# Patient Record
Sex: MALE | Race: WHITE | Employment: OTHER | URBAN - NONMETROPOLITAN AREA
[De-identification: names, ages, dates, MRNs, and addresses within clinical notes are randomized per-mention and may not be internally consistent; named-entity substitution may affect disease eponyms.]

---

## 2017-01-24 ENCOUNTER — HOSPITAL ENCOUNTER (OUTPATIENT)
Dept: VASCULAR LAB | Age: 72
Discharge: HOME | End: 2017-01-24
Payer: COMMERCIAL

## 2017-01-24 DIAGNOSIS — I65.23 BILATERAL CAROTID ARTERY STENOSIS: ICD-10-CM

## 2017-01-24 PROCEDURE — 93880 EXTRACRANIAL BILAT STUDY: CPT

## 2017-01-25 ENCOUNTER — TELEPHONE (OUTPATIENT)
Dept: VASCULAR SURGERY | Age: 72
End: 2017-01-25

## 2017-01-25 DIAGNOSIS — I65.23 BILATERAL CAROTID ARTERY STENOSIS: Primary | ICD-10-CM

## 2018-01-30 ENCOUNTER — OFFICE VISIT (OUTPATIENT)
Dept: VASCULAR SURGERY | Age: 73
End: 2018-01-30
Payer: COMMERCIAL

## 2018-01-30 ENCOUNTER — HOSPITAL ENCOUNTER (OUTPATIENT)
Dept: NON INVASIVE DIAGNOSTICS | Age: 73
Discharge: HOME | End: 2018-01-30
Payer: COMMERCIAL

## 2018-01-30 VITALS
WEIGHT: 200 LBS | SYSTOLIC BLOOD PRESSURE: 148 MMHG | TEMPERATURE: 98.4 F | BODY MASS INDEX: 28.63 KG/M2 | HEIGHT: 70 IN | RESPIRATION RATE: 16 BRPM | DIASTOLIC BLOOD PRESSURE: 74 MMHG | HEART RATE: 73 BPM

## 2018-01-30 DIAGNOSIS — I65.23 BILATERAL CAROTID ARTERY STENOSIS: Primary | ICD-10-CM

## 2018-01-30 DIAGNOSIS — I65.23 BILATERAL CAROTID ARTERY STENOSIS: ICD-10-CM

## 2018-01-30 PROCEDURE — G8598 ASA/ANTIPLAT THER USED: HCPCS | Performed by: NURSE PRACTITIONER

## 2018-01-30 PROCEDURE — 93880 EXTRACRANIAL BILAT STUDY: CPT

## 2018-01-30 PROCEDURE — G8419 CALC BMI OUT NRM PARAM NOF/U: HCPCS | Performed by: NURSE PRACTITIONER

## 2018-01-30 PROCEDURE — G8484 FLU IMMUNIZE NO ADMIN: HCPCS | Performed by: NURSE PRACTITIONER

## 2018-01-30 PROCEDURE — 99212 OFFICE O/P EST SF 10 MIN: CPT | Performed by: NURSE PRACTITIONER

## 2018-01-30 PROCEDURE — G8427 DOCREV CUR MEDS BY ELIG CLIN: HCPCS | Performed by: NURSE PRACTITIONER

## 2018-01-30 PROCEDURE — 4040F PNEUMOC VAC/ADMIN/RCVD: CPT | Performed by: NURSE PRACTITIONER

## 2018-01-30 PROCEDURE — 1123F ACP DISCUSS/DSCN MKR DOCD: CPT | Performed by: NURSE PRACTITIONER

## 2018-01-30 PROCEDURE — 3017F COLORECTAL CA SCREEN DOC REV: CPT | Performed by: NURSE PRACTITIONER

## 2018-01-30 PROCEDURE — 1036F TOBACCO NON-USER: CPT | Performed by: NURSE PRACTITIONER

## 2018-08-02 ENCOUNTER — HOSPITAL ENCOUNTER (OUTPATIENT)
Dept: VASCULAR LAB | Age: 73
Discharge: HOME | End: 2018-08-02
Payer: MEDICARE

## 2018-08-02 ENCOUNTER — OFFICE VISIT (OUTPATIENT)
Dept: VASCULAR SURGERY | Age: 73
End: 2018-08-02
Payer: COMMERCIAL

## 2018-08-02 VITALS — SYSTOLIC BLOOD PRESSURE: 135 MMHG | DIASTOLIC BLOOD PRESSURE: 74 MMHG | HEART RATE: 79 BPM | TEMPERATURE: 98.2 F

## 2018-08-02 DIAGNOSIS — I65.23 BILATERAL CAROTID ARTERY STENOSIS: ICD-10-CM

## 2018-08-02 DIAGNOSIS — I65.23 BILATERAL CAROTID ARTERY STENOSIS: Primary | ICD-10-CM

## 2018-08-02 PROCEDURE — 99213 OFFICE O/P EST LOW 20 MIN: CPT | Performed by: PHYSICIAN ASSISTANT

## 2018-08-02 PROCEDURE — 1101F PT FALLS ASSESS-DOCD LE1/YR: CPT | Performed by: PHYSICIAN ASSISTANT

## 2018-08-02 PROCEDURE — 3017F COLORECTAL CA SCREEN DOC REV: CPT | Performed by: PHYSICIAN ASSISTANT

## 2018-08-02 PROCEDURE — G8598 ASA/ANTIPLAT THER USED: HCPCS | Performed by: PHYSICIAN ASSISTANT

## 2018-08-02 PROCEDURE — G8428 CUR MEDS NOT DOCUMENT: HCPCS | Performed by: PHYSICIAN ASSISTANT

## 2018-08-02 PROCEDURE — 1123F ACP DISCUSS/DSCN MKR DOCD: CPT | Performed by: PHYSICIAN ASSISTANT

## 2018-08-02 PROCEDURE — 4040F PNEUMOC VAC/ADMIN/RCVD: CPT | Performed by: PHYSICIAN ASSISTANT

## 2018-08-02 PROCEDURE — 1036F TOBACCO NON-USER: CPT | Performed by: PHYSICIAN ASSISTANT

## 2018-08-02 PROCEDURE — G8419 CALC BMI OUT NRM PARAM NOF/U: HCPCS | Performed by: PHYSICIAN ASSISTANT

## 2018-08-02 PROCEDURE — 93880 EXTRACRANIAL BILAT STUDY: CPT

## 2018-08-02 NOTE — PROGRESS NOTES
Patient Care Team:  Iban Swain MD as PCP - Agustina Kent MD      Subjective    Mr. Alli Wood presents for follow-up of carotid artery stenosis. He has prior history of carotid occlusive disease for 1 - 5 years. His current treatment includes ASA EC, Plavix and a statin  daily. He denies a history of CVA. He reports no TIA's, episodes of amaurosis fugax and episodes of lateralizing weakness/numbness/tingling. He reports occasional dizziness. Shabbir Bartholomew is a 72 y.o. 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  ezetimibe-simvastatin (VYTORIN) 10-40 MG per tablet Take 1 tablet by mouth nightly.  gabapentin (NEURONTIN) 600 MG tablet Take 600 mg by mouth 4 times daily.  tramadol-acetaminophen (ULTRACET) 37.5-325 MG per tablet Take 1 tablet by mouth every 6 hours as needed.  metformin (GLUCOPHAGE) 1000 MG tablet Take 1,000 mg by mouth 2 times daily (with meals).  esomeprazole (NEXIUM) 40 MG capsule Take 40 mg by mouth every morning (before breakfast).  amitriptyline (ELAVIL) 100 MG tablet Take 100 mg by mouth nightly.  aspirin 81 MG EC tablet Take 81 mg by mouth daily.  clopidogrel (PLAVIX) 75 MG tablet Take 75 mg by mouth daily.         magnesium oxide (MAG-OX) 400 MG tablet Take 400 mg by mouth daily. No current facility-administered medications for this visit. Current Outpatient Prescriptions on File Prior to Visit   Medication Sig Dispense Refill    amLODIPine (NORVASC) 5 MG tablet       diclofenac (VOLTAREN) 50 MG EC tablet   3    indapamide (LOZOL) 1.25 MG tablet       ramipril (ALTACE) 10 MG capsule       simvastatin (ZOCOR) 40 MG tablet       tamsulosin (FLOMAX) 0.4 MG capsule       diclofenac (CATAFLAM) 50 MG tablet Take 50 mg by mouth 3 times daily.  ezetimibe-simvastatin (VYTORIN) 10-40 MG per tablet Take 1 tablet by mouth nightly.  gabapentin (NEURONTIN) 600 MG tablet Take 600 mg by mouth 4 times daily.  tramadol-acetaminophen (ULTRACET) 37.5-325 MG per tablet Take 1 tablet by mouth every 6 hours as needed.  metformin (GLUCOPHAGE) 1000 MG tablet Take 1,000 mg by mouth 2 times daily (with meals).  esomeprazole (NEXIUM) 40 MG capsule Take 40 mg by mouth every morning (before breakfast).  amitriptyline (ELAVIL) 100 MG tablet Take 100 mg by mouth nightly.  aspirin 81 MG EC tablet Take 81 mg by mouth daily.  clopidogrel (PLAVIX) 75 MG tablet Take 75 mg by mouth daily.  magnesium oxide (MAG-OX) 400 MG tablet Take 400 mg by mouth daily. No current facility-administered medications on file prior to visit.       Allergies: Quinine derivatives and Sulfur  Past Medical History:   Diagnosis Date    Carotid artery stenosis 2012    Chronic back pain     Diabetes mellitus ([facility] 2013    Diabetic vasculopathy ([facility] 2013    GERD (gastroesophageal reflux disease)     Hyperlipidemia     Hypertension      Past Surgical History:   Procedure Laterality Date    COLONOSCOPY      EYE SURGERY      both eyes    HEMORRHOID SURGERY       Family History   Problem Relation Age of Onset    High Blood Pressure Mother     Heart Attack Mother          of MI     Social History Substance Use Topics    Smoking status: Former Smoker     Years: 20.00     Types: Cigarettes     Quit date: 12/13/1983    Smokeless tobacco: Never Used    Alcohol use No       Review of Systems    Constitutional  no significant activity change, appetite change, or unexpected weight change. No fever or chills. No diaphoresis or significant fatigue. HENT  no significant rhinorrhea or epistaxis. No tinnitus or significant hearing loss. Eyes  no sudden vision change or amaurosis. Respiratory  no significant shortness of breath, wheezing, or stridor. No apnea, cough, or chest tightness associated with shortness of breath. Cardiovascular  no chest pain, syncope, or significant dizziness. No palpitations or significant leg swelling. No claudication. Gastrointestinal  no abdominal swelling or pain. No blood in stool. No severe constipation, diarrhea, nausea, or vomiting. Genitourinary  No difficulty urinating, dysuria, frequency, or urgency. No flank pain or hematuria. Musculoskeletal  no back pain, gait disturbance, or myalgia. Skin  no color change, rash, pallor, or new wound. Neurologic  no dizziness, facial asymmetry, or light headedness. No seizures. No speech difficulty or lateralizing weakness. Hematologic  no easy bruising or excessive bleeding. Psychiatric  no severe anxiety or nervousness. No confusion. All other review of systems are negative. Physical Exam    Vitals:    08/02/18 1341 08/02/18 1343   BP: 126/71 135/74   Site: Right Arm Left Arm   Pulse: 83 79   Temp: 98.2 °F (36.8 °C)        Constitutional  well developed, well nourished. No diaphoresis or acute distress. HENT  head normocephalic. Right external ear canal appears normal.  Left external ear canal appears normal.  Septum appears midline. Eyes  conjunctiva normal.  EOMS normal.  No exudate. No icterus. Neck- ROM appears normal, no tracheal deviation. Cardiovascular  Regular rate and rhythm.

## 2019-10-07 ENCOUNTER — HOSPITAL ENCOUNTER (OUTPATIENT)
Dept: VASCULAR LAB | Age: 74
Discharge: HOME | End: 2019-10-07
Payer: COMMERCIAL

## 2019-10-07 PROCEDURE — 93880 EXTRACRANIAL BILAT STUDY: CPT

## 2019-10-18 ENCOUNTER — TELEPHONE (OUTPATIENT)
Dept: VASCULAR SURGERY | Age: 74
End: 2019-10-18

## 2019-10-18 DIAGNOSIS — I65.23 BILATERAL CAROTID ARTERY STENOSIS: Primary | ICD-10-CM

## 2020-04-16 ENCOUNTER — VIRTUAL VISIT (OUTPATIENT)
Dept: VASCULAR SURGERY | Age: 75
End: 2020-04-16
Payer: COMMERCIAL

## 2020-04-16 PROCEDURE — G2012 BRIEF CHECK IN BY MD/QHP: HCPCS | Performed by: PHYSICIAN ASSISTANT

## 2020-05-27 ENCOUNTER — TRANSCRIBE ORDERS (OUTPATIENT)
Dept: ADMINISTRATIVE | Facility: HOSPITAL | Age: 75
End: 2020-05-27

## 2020-05-27 DIAGNOSIS — Z01.818 PREOP TESTING: Primary | ICD-10-CM

## 2020-05-29 ENCOUNTER — HOSPITAL ENCOUNTER (OUTPATIENT)
Dept: VASCULAR LAB | Age: 75
Discharge: HOME | End: 2020-05-29
Payer: COMMERCIAL

## 2020-05-29 PROCEDURE — 93880 EXTRACRANIAL BILAT STUDY: CPT

## 2020-06-02 ENCOUNTER — LAB (OUTPATIENT)
Dept: LAB | Facility: HOSPITAL | Age: 75
End: 2020-06-02

## 2020-06-02 PROCEDURE — U0003 INFECTIOUS AGENT DETECTION BY NUCLEIC ACID (DNA OR RNA); SEVERE ACUTE RESPIRATORY SYNDROME CORONAVIRUS 2 (SARS-COV-2) (CORONAVIRUS DISEASE [COVID-19]), AMPLIFIED PROBE TECHNIQUE, MAKING USE OF HIGH THROUGHPUT TECHNOLOGIES AS DESCRIBED BY CMS-2020-01-R: HCPCS | Performed by: PAIN MEDICINE

## 2020-06-03 LAB
COVID LABCORP PRIORITY: NORMAL
SARS-COV-2 RNA RESP QL NAA+PROBE: NOT DETECTED

## 2020-06-04 ENCOUNTER — VIRTUAL VISIT (OUTPATIENT)
Dept: VASCULAR SURGERY | Age: 75
End: 2020-06-04

## 2020-09-15 ENCOUNTER — TRANSCRIBE ORDERS (OUTPATIENT)
Dept: ADMINISTRATIVE | Facility: HOSPITAL | Age: 75
End: 2020-09-15

## 2020-09-15 DIAGNOSIS — Z01.818 PREOP TESTING: Primary | ICD-10-CM

## 2020-09-17 ENCOUNTER — LAB (OUTPATIENT)
Dept: LAB | Facility: HOSPITAL | Age: 75
End: 2020-09-17

## 2020-09-17 PROCEDURE — U0003 INFECTIOUS AGENT DETECTION BY NUCLEIC ACID (DNA OR RNA); SEVERE ACUTE RESPIRATORY SYNDROME CORONAVIRUS 2 (SARS-COV-2) (CORONAVIRUS DISEASE [COVID-19]), AMPLIFIED PROBE TECHNIQUE, MAKING USE OF HIGH THROUGHPUT TECHNOLOGIES AS DESCRIBED BY CMS-2020-01-R: HCPCS | Performed by: PAIN MEDICINE

## 2020-09-17 PROCEDURE — C9803 HOPD COVID-19 SPEC COLLECT: HCPCS | Performed by: PAIN MEDICINE

## 2020-09-18 LAB
COVID LABCORP PRIORITY: NORMAL
SARS-COV-2 RNA RESP QL NAA+PROBE: NOT DETECTED

## 2020-12-07 ENCOUNTER — HOSPITAL ENCOUNTER (OUTPATIENT)
Dept: VASCULAR LAB | Age: 75
Discharge: HOME | End: 2020-12-07
Payer: MEDICARE

## 2020-12-07 PROCEDURE — 93880 EXTRACRANIAL BILAT STUDY: CPT

## 2020-12-08 ENCOUNTER — VIRTUAL VISIT (OUTPATIENT)
Dept: VASCULAR SURGERY | Age: 75
End: 2020-12-08
Payer: MEDICARE

## 2020-12-08 PROCEDURE — 99441 PR PHYS/QHP TELEPHONE EVALUATION 5-10 MIN: CPT | Performed by: PHYSICIAN ASSISTANT

## 2020-12-08 NOTE — PROGRESS NOTES
Karolina Crook is a 70 y.o. male evaluated via telephone on 12/8/2020. Consent:  He and/or health care decision maker is aware that that he may receive a bill for this telephone service, depending on his insurance coverage, and has provided verbal consent to proceed: Yes      Documentation:  I communicated with the patient and/or health care decision maker about due to the covid-19 Pandemic, we are trying to limit exposures to our patients whom have elective follow ups. We are calling each individual patient to make sure they are doing okay. Karolina Crook is a 70 y.o. male with the following history as recorded in [facility]:  Patient Active Problem List    Diagnosis Date Noted    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  ezetimibe-simvastatin (VYTORIN) 10-40 MG per tablet Take 1 tablet by mouth nightly.  gabapentin (NEURONTIN) 600 MG tablet Take 600 mg by mouth 4 times daily.  tramadol-acetaminophen (ULTRACET) 37.5-325 MG per tablet Take 1 tablet by mouth every 6 hours as needed.  metformin (GLUCOPHAGE) 1000 MG tablet Take 1,000 mg by mouth 2 times daily (with meals).  esomeprazole (NEXIUM) 40 MG capsule Take 40 mg by mouth every morning (before breakfast).  amitriptyline (ELAVIL) 100 MG tablet Take 100 mg by mouth nightly.  aspirin 81 MG EC tablet Take 81 mg by mouth daily.         clopidogrel (PLAVIX) 75 MG tablet Take 75 mg by mouth daily.  magnesium oxide (MAG-OX) 400 MG tablet Take 400 mg by mouth daily. No current facility-administered medications for this visit. Allergies: Quinine derivatives and Sulfur  Past Medical History:   Diagnosis Date    Carotid artery stenosis 2012    Chronic back pain     Diabetes mellitus ([facility] 2013    Diabetic vasculopathy ([facility] 2013    GERD (gastroesophageal reflux disease)     Hyperlipidemia     Hypertension      Past Surgical History:   Procedure Laterality Date    COLONOSCOPY      EYE SURGERY      both eyes    HEMORRHOID SURGERY       Family History   Problem Relation Age of Onset    High Blood Pressure Mother     Heart Attack Mother          of MI     Social History     Tobacco Use    Smoking status: Former Smoker     Years: 20.00     Types: Cigarettes     Last attempt to quit: 1983     Years since quittin.0    Smokeless tobacco: Never Used   Substance Use Topics    Alcohol use: No       Details of this discussion including any medical advice provided: PMH, medications and allergies reviewed. Mr. Sebastian Mckoy is a 71 yo male who has a history of Carotid Artery Stenosis. He reports that his BP when he goes to the Doctor is always good but he does not check it at home. He reports that his last A1c was down from previous but he cannot recall what it was. He does not smoke. he denies any new onset of partial or complete loss of vision affecting only one eye, speech difficulty or lateralizing weakness, numbness/tingling.        CDU -    and arterial hypertension.          Impression          There is heterogeneous plaque visualized in the bilateral internal carotid     artery(ies).     There is less than 50% stenosis in the right internal carotid artery.     There is 50-69% stenosis of the left internal carotid artery.     There is normal antegrade flow in the bilateral vertebral artery(ies).          Signature

## 2020-12-18 ENCOUNTER — TRANSCRIBE ORDERS (OUTPATIENT)
Dept: ADMINISTRATIVE | Facility: HOSPITAL | Age: 75
End: 2020-12-18

## 2020-12-18 DIAGNOSIS — Z01.818 PREOP TESTING: Primary | ICD-10-CM

## 2020-12-24 ENCOUNTER — LAB (OUTPATIENT)
Dept: LAB | Facility: HOSPITAL | Age: 75
End: 2020-12-24

## 2020-12-24 DIAGNOSIS — Z01.818 PRE-OP EVALUATION: Primary | ICD-10-CM

## 2020-12-24 LAB — SARS-COV-2 ORF1AB RESP QL NAA+PROBE: NOT DETECTED

## 2020-12-24 PROCEDURE — C9803 HOPD COVID-19 SPEC COLLECT: HCPCS

## 2020-12-24 PROCEDURE — U0004 COV-19 TEST NON-CDC HGH THRU: HCPCS | Performed by: PAIN MEDICINE

## 2021-01-07 ENCOUNTER — HOSPITAL ENCOUNTER (INPATIENT)
Facility: HOSPITAL | Age: 76
LOS: 15 days | Discharge: HOME | End: 2021-01-23
Attending: EMERGENCY MEDICINE | Admitting: THORACIC SURGERY (CARDIOTHORACIC VASCULAR SURGERY)

## 2021-01-07 DIAGNOSIS — I21.4 NSTEMI (NON-ST ELEVATED MYOCARDIAL INFARCTION) (HCC): Primary | ICD-10-CM

## 2021-01-07 DIAGNOSIS — Z74.09 IMPAIRED MOBILITY: ICD-10-CM

## 2021-01-07 DIAGNOSIS — I25.10 3-VESSEL CORONARY ARTERY DISEASE: ICD-10-CM

## 2021-01-07 DIAGNOSIS — I50.9 ACUTE CONGESTIVE HEART FAILURE, UNSPECIFIED HEART FAILURE TYPE (HCC): ICD-10-CM

## 2021-01-07 PROCEDURE — 99285 EMERGENCY DEPT VISIT HI MDM: CPT

## 2021-01-08 ENCOUNTER — APPOINTMENT (OUTPATIENT)
Dept: CARDIOLOGY | Facility: HOSPITAL | Age: 76
End: 2021-01-08

## 2021-01-08 ENCOUNTER — APPOINTMENT (OUTPATIENT)
Dept: GENERAL RADIOLOGY | Facility: HOSPITAL | Age: 76
End: 2021-01-08

## 2021-01-08 ENCOUNTER — APPOINTMENT (OUTPATIENT)
Dept: CT IMAGING | Facility: HOSPITAL | Age: 76
End: 2021-01-08

## 2021-01-08 PROBLEM — I50.21 ACUTE SYSTOLIC CONGESTIVE HEART FAILURE: Status: ACTIVE | Noted: 2021-01-08

## 2021-01-08 PROBLEM — I21.4 NSTEMI (NON-ST ELEVATED MYOCARDIAL INFARCTION) (HCC): Status: ACTIVE | Noted: 2021-01-08

## 2021-01-08 PROBLEM — J81.0 ACUTE PULMONARY EDEMA: Status: ACTIVE | Noted: 2021-01-08

## 2021-01-08 LAB
ALBUMIN SERPL-MCNC: 4.6 G/DL (ref 3.5–5.2)
ALBUMIN/GLOB SERPL: 1.5 G/DL
ALP SERPL-CCNC: 68 U/L (ref 39–117)
ALT SERPL W P-5'-P-CCNC: 17 U/L (ref 1–41)
ANION GAP SERPL CALCULATED.3IONS-SCNC: 13 MMOL/L (ref 5–15)
AST SERPL-CCNC: 29 U/L (ref 1–40)
BASOPHILS # BLD AUTO: 0.07 10*3/MM3 (ref 0–0.2)
BASOPHILS NFR BLD AUTO: 0.4 % (ref 0–1.5)
BH CV ECHO MEAS - AO MAX PG (FULL): 2.6 MMHG
BH CV ECHO MEAS - AO MAX PG: 6.1 MMHG
BH CV ECHO MEAS - AO MEAN PG (FULL): 1 MMHG
BH CV ECHO MEAS - AO MEAN PG: 3 MMHG
BH CV ECHO MEAS - AO ROOT AREA (BSA CORRECTED): 1.5
BH CV ECHO MEAS - AO ROOT AREA: 7.5 CM^2
BH CV ECHO MEAS - AO ROOT DIAM: 3.1 CM
BH CV ECHO MEAS - AO V2 MAX: 123 CM/SEC
BH CV ECHO MEAS - AO V2 MEAN: 82.6 CM/SEC
BH CV ECHO MEAS - AO V2 VTI: 16.8 CM
BH CV ECHO MEAS - AVA(I,A): 2.4 CM^2
BH CV ECHO MEAS - AVA(I,D): 2.4 CM^2
BH CV ECHO MEAS - AVA(V,A): 2.1 CM^2
BH CV ECHO MEAS - AVA(V,D): 2.1 CM^2
BH CV ECHO MEAS - BSA(HAYCOCK): 2.1 M^2
BH CV ECHO MEAS - BSA: 2.1 M^2
BH CV ECHO MEAS - BZI_BMI: 29.2 KILOGRAMS/M^2
BH CV ECHO MEAS - BZI_METRIC_HEIGHT: 175.3 CM
BH CV ECHO MEAS - BZI_METRIC_WEIGHT: 89.8 KG
BH CV ECHO MEAS - EDV(CUBED): 144.7 ML
BH CV ECHO MEAS - EDV(MOD-SP4): 105 ML
BH CV ECHO MEAS - EDV(TEICH): 132.4 ML
BH CV ECHO MEAS - EF(CUBED): 74.9 %
BH CV ECHO MEAS - EF(MOD-SP4): 41.8 %
BH CV ECHO MEAS - EF(TEICH): 66.4 %
BH CV ECHO MEAS - ESV(CUBED): 36.3 ML
BH CV ECHO MEAS - ESV(MOD-SP4): 61.1 ML
BH CV ECHO MEAS - ESV(TEICH): 44.5 ML
BH CV ECHO MEAS - FS: 37 %
BH CV ECHO MEAS - IVS/LVPW: 1.2
BH CV ECHO MEAS - IVSD: 1.1 CM
BH CV ECHO MEAS - LA DIMENSION: 4 CM
BH CV ECHO MEAS - LA/AO: 1.3
BH CV ECHO MEAS - LAT PEAK E' VEL: 4.5 CM/SEC
BH CV ECHO MEAS - LV DIASTOLIC VOL/BSA (35-75): 51 ML/M^2
BH CV ECHO MEAS - LV MASS(C)D: 185.9 GRAMS
BH CV ECHO MEAS - LV MASS(C)DI: 90.4 GRAMS/M^2
BH CV ECHO MEAS - LV MAX PG: 3.5 MMHG
BH CV ECHO MEAS - LV MEAN PG: 2 MMHG
BH CV ECHO MEAS - LV SYSTOLIC VOL/BSA (12-30): 29.7 ML/M^2
BH CV ECHO MEAS - LV V1 MAX: 93 CM/SEC
BH CV ECHO MEAS - LV V1 MEAN: 63.1 CM/SEC
BH CV ECHO MEAS - LV V1 VTI: 14.2 CM
BH CV ECHO MEAS - LVIDD: 5.3 CM
BH CV ECHO MEAS - LVIDS: 3.3 CM
BH CV ECHO MEAS - LVLD AP4: 7.7 CM
BH CV ECHO MEAS - LVLS AP4: 7.5 CM
BH CV ECHO MEAS - LVOT AREA (M): 2.8 CM^2
BH CV ECHO MEAS - LVOT AREA: 2.8 CM^2
BH CV ECHO MEAS - LVOT DIAM: 1.9 CM
BH CV ECHO MEAS - LVPWD: 0.86 CM
BH CV ECHO MEAS - MED PEAK E' VEL: 4.13 CM/SEC
BH CV ECHO MEAS - MV A MAX VEL: 96.2 CM/SEC
BH CV ECHO MEAS - MV DEC TIME: 0.16 SEC
BH CV ECHO MEAS - MV E MAX VEL: 47.5 CM/SEC
BH CV ECHO MEAS - MV E/A: 0.49
BH CV ECHO MEAS - PA MAX PG: 7.2 MMHG
BH CV ECHO MEAS - PA V2 MAX: 134 CM/SEC
BH CV ECHO MEAS - RAP SYSTOLE: 5 MMHG
BH CV ECHO MEAS - RVSP: 23.3 MMHG
BH CV ECHO MEAS - SI(AO): 61.6 ML/M^2
BH CV ECHO MEAS - SI(CUBED): 52.7 ML/M^2
BH CV ECHO MEAS - SI(LVOT): 19.6 ML/M^2
BH CV ECHO MEAS - SI(MOD-SP4): 21.3 ML/M^2
BH CV ECHO MEAS - SI(TEICH): 42.8 ML/M^2
BH CV ECHO MEAS - SV(AO): 126.8 ML
BH CV ECHO MEAS - SV(CUBED): 108.4 ML
BH CV ECHO MEAS - SV(LVOT): 40.3 ML
BH CV ECHO MEAS - SV(MOD-SP4): 43.9 ML
BH CV ECHO MEAS - SV(TEICH): 88 ML
BH CV ECHO MEAS - TR MAX VEL: 214 CM/SEC
BH CV ECHO MEASUREMENTS AVERAGE E/E' RATIO: 11.01
BILIRUB SERPL-MCNC: 0.5 MG/DL (ref 0–1.2)
BUN SERPL-MCNC: 20 MG/DL (ref 8–23)
BUN/CREAT SERPL: 26.3 (ref 7–25)
CALCIUM SPEC-SCNC: 10.1 MG/DL (ref 8.6–10.5)
CHLORIDE SERPL-SCNC: 98 MMOL/L (ref 98–107)
CO2 SERPL-SCNC: 23 MMOL/L (ref 22–29)
CREAT SERPL-MCNC: 0.76 MG/DL (ref 0.76–1.27)
D DIMER PPP FEU-MCNC: 1.14 MG/L (FEU) (ref 0–0.5)
D-LACTATE SERPL-SCNC: 1.7 MMOL/L (ref 0.5–2)
D-LACTATE SERPL-SCNC: 2.1 MMOL/L (ref 0.5–2)
DEPRECATED RDW RBC AUTO: 38.8 FL (ref 37–54)
EOSINOPHIL # BLD AUTO: 0.01 10*3/MM3 (ref 0–0.4)
EOSINOPHIL NFR BLD AUTO: 0.1 % (ref 0.3–6.2)
ERYTHROCYTE [DISTWIDTH] IN BLOOD BY AUTOMATED COUNT: 12.7 % (ref 12.3–15.4)
GFR SERPL CREATININE-BSD FRML MDRD: 101 ML/MIN/1.73
GLOBULIN UR ELPH-MCNC: 3 GM/DL
GLUCOSE BLDC GLUCOMTR-MCNC: 144 MG/DL (ref 70–130)
GLUCOSE BLDC GLUCOMTR-MCNC: 180 MG/DL (ref 70–130)
GLUCOSE BLDC GLUCOMTR-MCNC: 191 MG/DL (ref 70–130)
GLUCOSE BLDC GLUCOMTR-MCNC: 227 MG/DL (ref 70–130)
GLUCOSE SERPL-MCNC: 230 MG/DL (ref 65–99)
HCT VFR BLD AUTO: 45.3 % (ref 37.5–51)
HGB BLD-MCNC: 15.5 G/DL (ref 13–17.7)
IMM GRANULOCYTES # BLD AUTO: 0.11 10*3/MM3 (ref 0–0.05)
IMM GRANULOCYTES NFR BLD AUTO: 0.6 % (ref 0–0.5)
INR PPP: 1.03 (ref 0.91–1.09)
LACTATE HOLD SPECIMEN: NORMAL
LEFT ATRIUM VOLUME INDEX: 28.9 ML/M2
LEFT ATRIUM VOLUME: 59.6 CM3
LYMPHOCYTES # BLD AUTO: 1.84 10*3/MM3 (ref 0.7–3.1)
LYMPHOCYTES NFR BLD AUTO: 10 % (ref 19.6–45.3)
MAXIMAL PREDICTED HEART RATE: 147 BPM
MCH RBC QN AUTO: 28.9 PG (ref 26.6–33)
MCHC RBC AUTO-ENTMCNC: 34.2 G/DL (ref 31.5–35.7)
MCV RBC AUTO: 84.5 FL (ref 79–97)
MONOCYTES # BLD AUTO: 2.18 10*3/MM3 (ref 0.1–0.9)
MONOCYTES NFR BLD AUTO: 11.8 % (ref 5–12)
NEUTROPHILS NFR BLD AUTO: 14.22 10*3/MM3 (ref 1.7–7)
NEUTROPHILS NFR BLD AUTO: 77.1 % (ref 42.7–76)
NRBC BLD AUTO-RTO: 0 /100 WBC (ref 0–0.2)
NT-PROBNP SERPL-MCNC: 1608 PG/ML (ref 0–900)
PLATELET # BLD AUTO: 307 10*3/MM3 (ref 140–450)
PMV BLD AUTO: 10.9 FL (ref 6–12)
POTASSIUM SERPL-SCNC: 5 MMOL/L (ref 3.5–5.2)
PROCALCITONIN SERPL-MCNC: 0.15 NG/ML (ref 0–0.25)
PROT SERPL-MCNC: 7.6 G/DL (ref 6–8.5)
PROTHROMBIN TIME: 13.1 SECONDS (ref 11.9–14.6)
QT INTERVAL: 344 MS
QTC INTERVAL: 459 MS
RBC # BLD AUTO: 5.36 10*6/MM3 (ref 4.14–5.8)
SARS-COV-2 RNA PNL SPEC NAA+PROBE: NOT DETECTED
SODIUM SERPL-SCNC: 134 MMOL/L (ref 136–145)
STRESS TARGET HR: 125 BPM
TROPONIN T SERPL-MCNC: 0.16 NG/ML (ref 0–0.03)
TROPONIN T SERPL-MCNC: 0.26 NG/ML (ref 0–0.03)
TROPONIN T SERPL-MCNC: 0.29 NG/ML (ref 0–0.03)
TROPONIN T SERPL-MCNC: 0.3 NG/ML (ref 0–0.03)
TROPONIN T SERPL-MCNC: 0.45 NG/ML (ref 0–0.03)
TROPONIN T SERPL-MCNC: 0.45 NG/ML (ref 0–0.03)
TROPONIN T SERPL-MCNC: 0.54 NG/ML (ref 0–0.03)
TROPONIN T SERPL-MCNC: 0.62 NG/ML (ref 0–0.03)
WBC # BLD AUTO: 18.43 10*3/MM3 (ref 3.4–10.8)

## 2021-01-08 PROCEDURE — 85025 COMPLETE CBC W/AUTO DIFF WBC: CPT | Performed by: EMERGENCY MEDICINE

## 2021-01-08 PROCEDURE — 87635 SARS-COV-2 COVID-19 AMP PRB: CPT | Performed by: EMERGENCY MEDICINE

## 2021-01-08 PROCEDURE — 94799 UNLISTED PULMONARY SVC/PX: CPT

## 2021-01-08 PROCEDURE — 25010000002 ENOXAPARIN PER 10 MG: Performed by: INTERNAL MEDICINE

## 2021-01-08 PROCEDURE — 0 IOPAMIDOL PER 1 ML: Performed by: EMERGENCY MEDICINE

## 2021-01-08 PROCEDURE — 83880 ASSAY OF NATRIURETIC PEPTIDE: CPT | Performed by: EMERGENCY MEDICINE

## 2021-01-08 PROCEDURE — 71275 CT ANGIOGRAPHY CHEST: CPT

## 2021-01-08 PROCEDURE — 87205 SMEAR GRAM STAIN: CPT | Performed by: FAMILY MEDICINE

## 2021-01-08 PROCEDURE — 87070 CULTURE OTHR SPECIMN AEROBIC: CPT | Performed by: FAMILY MEDICINE

## 2021-01-08 PROCEDURE — 25010000002 FUROSEMIDE PER 20 MG: Performed by: INTERNAL MEDICINE

## 2021-01-08 PROCEDURE — 85610 PROTHROMBIN TIME: CPT | Performed by: EMERGENCY MEDICINE

## 2021-01-08 PROCEDURE — 84484 ASSAY OF TROPONIN QUANT: CPT | Performed by: NURSE PRACTITIONER

## 2021-01-08 PROCEDURE — 71045 X-RAY EXAM CHEST 1 VIEW: CPT

## 2021-01-08 PROCEDURE — 80053 COMPREHEN METABOLIC PANEL: CPT | Performed by: EMERGENCY MEDICINE

## 2021-01-08 PROCEDURE — 99223 1ST HOSP IP/OBS HIGH 75: CPT | Performed by: INTERNAL MEDICINE

## 2021-01-08 PROCEDURE — 63710000001 INSULIN LISPRO (HUMAN) PER 5 UNITS: Performed by: INTERNAL MEDICINE

## 2021-01-08 PROCEDURE — 93306 TTE W/DOPPLER COMPLETE: CPT

## 2021-01-08 PROCEDURE — 85379 FIBRIN DEGRADATION QUANT: CPT | Performed by: EMERGENCY MEDICINE

## 2021-01-08 PROCEDURE — 25010000002 ENOXAPARIN PER 10 MG: Performed by: EMERGENCY MEDICINE

## 2021-01-08 PROCEDURE — 84484 ASSAY OF TROPONIN QUANT: CPT | Performed by: EMERGENCY MEDICINE

## 2021-01-08 PROCEDURE — 84484 ASSAY OF TROPONIN QUANT: CPT | Performed by: INTERNAL MEDICINE

## 2021-01-08 PROCEDURE — 25010000002 FUROSEMIDE PER 20 MG: Performed by: FAMILY MEDICINE

## 2021-01-08 PROCEDURE — 84145 PROCALCITONIN (PCT): CPT | Performed by: EMERGENCY MEDICINE

## 2021-01-08 PROCEDURE — 82962 GLUCOSE BLOOD TEST: CPT

## 2021-01-08 PROCEDURE — 83605 ASSAY OF LACTIC ACID: CPT | Performed by: EMERGENCY MEDICINE

## 2021-01-08 PROCEDURE — 93306 TTE W/DOPPLER COMPLETE: CPT | Performed by: INTERNAL MEDICINE

## 2021-01-08 PROCEDURE — 87040 BLOOD CULTURE FOR BACTERIA: CPT | Performed by: EMERGENCY MEDICINE

## 2021-01-08 PROCEDURE — 93010 ELECTROCARDIOGRAM REPORT: CPT | Performed by: INTERNAL MEDICINE

## 2021-01-08 PROCEDURE — 25010000002 PERFLUTREN 6.52 MG/ML SUSPENSION: Performed by: FAMILY MEDICINE

## 2021-01-08 PROCEDURE — 93005 ELECTROCARDIOGRAM TRACING: CPT | Performed by: EMERGENCY MEDICINE

## 2021-01-08 RX ORDER — CLOPIDOGREL BISULFATE 75 MG/1
75 TABLET ORAL DAILY
Status: DISCONTINUED | OUTPATIENT
Start: 2021-01-08 | End: 2021-01-11

## 2021-01-08 RX ORDER — FAMOTIDINE 20 MG/1
20 TABLET, FILM COATED ORAL DAILY
Status: DISCONTINUED | OUTPATIENT
Start: 2021-01-08 | End: 2021-01-18 | Stop reason: HOSPADM

## 2021-01-08 RX ORDER — DEXTROSE MONOHYDRATE 25 G/50ML
25 INJECTION, SOLUTION INTRAVENOUS
Status: DISCONTINUED | OUTPATIENT
Start: 2021-01-08 | End: 2021-01-18 | Stop reason: HOSPADM

## 2021-01-08 RX ORDER — GABAPENTIN 300 MG/1
600 CAPSULE ORAL EVERY 8 HOURS SCHEDULED
Status: DISCONTINUED | OUTPATIENT
Start: 2021-01-08 | End: 2021-01-18 | Stop reason: HOSPADM

## 2021-01-08 RX ORDER — TAMSULOSIN HYDROCHLORIDE 0.4 MG/1
0.4 CAPSULE ORAL DAILY
Status: DISCONTINUED | OUTPATIENT
Start: 2021-01-08 | End: 2021-01-17

## 2021-01-08 RX ORDER — CARVEDILOL 3.12 MG/1
3.12 TABLET ORAL 2 TIMES DAILY WITH MEALS
Status: DISCONTINUED | OUTPATIENT
Start: 2021-01-08 | End: 2021-01-10

## 2021-01-08 RX ORDER — NICOTINE POLACRILEX 4 MG
15 LOZENGE BUCCAL
Status: DISCONTINUED | OUTPATIENT
Start: 2021-01-08 | End: 2021-01-18 | Stop reason: HOSPADM

## 2021-01-08 RX ORDER — FUROSEMIDE 10 MG/ML
20 INJECTION INTRAMUSCULAR; INTRAVENOUS EVERY 12 HOURS
Status: DISCONTINUED | OUTPATIENT
Start: 2021-01-08 | End: 2021-01-08

## 2021-01-08 RX ORDER — ATORVASTATIN CALCIUM 40 MG/1
40 TABLET, FILM COATED ORAL DAILY
Status: DISCONTINUED | OUTPATIENT
Start: 2021-01-09 | End: 2021-01-12

## 2021-01-08 RX ORDER — INDAPAMIDE 1.25 MG/1
1.25 TABLET, FILM COATED ORAL EVERY MORNING
COMMUNITY
End: 2021-06-11

## 2021-01-08 RX ORDER — ASPIRIN 81 MG/1
81 TABLET ORAL DAILY
COMMUNITY

## 2021-01-08 RX ORDER — ASPIRIN 81 MG/1
81 TABLET, CHEWABLE ORAL DAILY
Status: DISCONTINUED | OUTPATIENT
Start: 2021-01-08 | End: 2021-01-18 | Stop reason: HOSPADM

## 2021-01-08 RX ORDER — SODIUM CHLORIDE 0.9 % (FLUSH) 0.9 %
10 SYRINGE (ML) INJECTION EVERY 12 HOURS SCHEDULED
Status: DISCONTINUED | OUTPATIENT
Start: 2021-01-08 | End: 2021-01-18 | Stop reason: HOSPADM

## 2021-01-08 RX ORDER — RANITIDINE 300 MG/1
300 TABLET ORAL 2 TIMES DAILY
COMMUNITY
End: 2021-06-11

## 2021-01-08 RX ORDER — AMLODIPINE BESYLATE 5 MG/1
5 TABLET ORAL DAILY
COMMUNITY

## 2021-01-08 RX ORDER — TAMSULOSIN HYDROCHLORIDE 0.4 MG/1
1 CAPSULE ORAL DAILY
COMMUNITY

## 2021-01-08 RX ORDER — SIMVASTATIN 40 MG
40 TABLET ORAL NIGHTLY
COMMUNITY
End: 2022-12-09

## 2021-01-08 RX ORDER — SODIUM CHLORIDE 0.9 % (FLUSH) 0.9 %
10 SYRINGE (ML) INJECTION AS NEEDED
Status: DISCONTINUED | OUTPATIENT
Start: 2021-01-08 | End: 2021-01-08 | Stop reason: SDUPTHER

## 2021-01-08 RX ORDER — CLOPIDOGREL BISULFATE 75 MG/1
75 TABLET ORAL DAILY
COMMUNITY

## 2021-01-08 RX ORDER — RAMIPRIL 10 MG/1
10 CAPSULE ORAL DAILY
COMMUNITY
End: 2021-01-23 | Stop reason: HOSPADM

## 2021-01-08 RX ORDER — FUROSEMIDE 10 MG/ML
40 INJECTION INTRAMUSCULAR; INTRAVENOUS
Status: DISCONTINUED | OUTPATIENT
Start: 2021-01-08 | End: 2021-01-09

## 2021-01-08 RX ORDER — MULTIPLE VITAMINS W/ MINERALS TAB 9MG-400MCG
1 TAB ORAL DAILY
COMMUNITY

## 2021-01-08 RX ORDER — ONDANSETRON 2 MG/ML
4 INJECTION INTRAMUSCULAR; INTRAVENOUS EVERY 6 HOURS PRN
Status: DISCONTINUED | OUTPATIENT
Start: 2021-01-08 | End: 2021-01-18 | Stop reason: HOSPADM

## 2021-01-08 RX ORDER — GABAPENTIN 600 MG/1
600 TABLET ORAL 4 TIMES DAILY
COMMUNITY

## 2021-01-08 RX ORDER — LISINOPRIL 5 MG/1
5 TABLET ORAL
Status: DISCONTINUED | OUTPATIENT
Start: 2021-01-08 | End: 2021-01-17

## 2021-01-08 RX ORDER — AMITRIPTYLINE HYDROCHLORIDE 100 MG/1
100 TABLET, FILM COATED ORAL NIGHTLY
COMMUNITY

## 2021-01-08 RX ORDER — ACETAMINOPHEN 325 MG/1
650 TABLET ORAL EVERY 4 HOURS PRN
Status: DISCONTINUED | OUTPATIENT
Start: 2021-01-08 | End: 2021-01-18 | Stop reason: HOSPADM

## 2021-01-08 RX ORDER — ATORVASTATIN CALCIUM 10 MG/1
20 TABLET, FILM COATED ORAL DAILY
Status: DISCONTINUED | OUTPATIENT
Start: 2021-01-08 | End: 2021-01-08

## 2021-01-08 RX ORDER — INDAPAMIDE 1.25 MG/1
1.25 TABLET, FILM COATED ORAL
Status: DISCONTINUED | OUTPATIENT
Start: 2021-01-08 | End: 2021-01-14

## 2021-01-08 RX ORDER — SODIUM CHLORIDE 0.9 % (FLUSH) 0.9 %
10 SYRINGE (ML) INJECTION AS NEEDED
Status: DISCONTINUED | OUTPATIENT
Start: 2021-01-08 | End: 2021-01-18 | Stop reason: HOSPADM

## 2021-01-08 RX ORDER — AMLODIPINE BESYLATE 5 MG/1
5 TABLET ORAL DAILY
Status: DISCONTINUED | OUTPATIENT
Start: 2021-01-08 | End: 2021-01-08

## 2021-01-08 RX ADMIN — GABAPENTIN 600 MG: 300 CAPSULE ORAL at 13:32

## 2021-01-08 RX ADMIN — ENOXAPARIN SODIUM 90 MG: 100 INJECTION SUBCUTANEOUS at 17:44

## 2021-01-08 RX ADMIN — ENOXAPARIN SODIUM 90 MG: 100 INJECTION SUBCUTANEOUS at 05:50

## 2021-01-08 RX ADMIN — TRAMADOL HYDROCHLORIDE AND ACETAMINOPHEN 1 TABLET: 37.5; 325 TABLET ORAL at 15:04

## 2021-01-08 RX ADMIN — FAMOTIDINE 20 MG: 20 TABLET, FILM COATED ORAL at 08:38

## 2021-01-08 RX ADMIN — CARVEDILOL 3.12 MG: 3.12 TABLET, FILM COATED ORAL at 17:47

## 2021-01-08 RX ADMIN — AMLODIPINE BESYLATE 5 MG: 5 TABLET ORAL at 08:38

## 2021-01-08 RX ADMIN — CLOPIDOGREL 75 MG: 75 TABLET, FILM COATED ORAL at 08:38

## 2021-01-08 RX ADMIN — FUROSEMIDE 20 MG: 10 INJECTION, SOLUTION INTRAVENOUS at 08:38

## 2021-01-08 RX ADMIN — INDAPAMIDE 1.25 MG: 1.25 TABLET, FILM COATED ORAL at 11:09

## 2021-01-08 RX ADMIN — FUROSEMIDE 40 MG: 10 INJECTION, SOLUTION INTRAMUSCULAR; INTRAVENOUS at 17:47

## 2021-01-08 RX ADMIN — GABAPENTIN 600 MG: 300 CAPSULE ORAL at 08:39

## 2021-01-08 RX ADMIN — LISINOPRIL 5 MG: 5 TABLET ORAL at 08:38

## 2021-01-08 RX ADMIN — TAMSULOSIN HYDROCHLORIDE 0.4 MG: 0.4 CAPSULE ORAL at 08:38

## 2021-01-08 RX ADMIN — ASPIRIN 81 MG: 81 TABLET, CHEWABLE ORAL at 17:47

## 2021-01-08 RX ADMIN — PERFLUTREN 9.78 MG: 6.52 INJECTION, SUSPENSION INTRAVENOUS at 13:18

## 2021-01-08 RX ADMIN — AMITRIPTYLINE HYDROCHLORIDE 100 MG: 25 TABLET, FILM COATED ORAL at 21:09

## 2021-01-08 RX ADMIN — SODIUM CHLORIDE, PRESERVATIVE FREE 10 ML: 5 INJECTION INTRAVENOUS at 21:09

## 2021-01-08 RX ADMIN — GABAPENTIN 600 MG: 300 CAPSULE ORAL at 22:50

## 2021-01-08 RX ADMIN — ATORVASTATIN CALCIUM 20 MG: 10 TABLET, FILM COATED ORAL at 08:38

## 2021-01-08 RX ADMIN — INSULIN LISPRO 3 UNITS: 100 INJECTION, SOLUTION INTRAVENOUS; SUBCUTANEOUS at 13:30

## 2021-01-08 RX ADMIN — INSULIN LISPRO 2 UNITS: 100 INJECTION, SOLUTION INTRAVENOUS; SUBCUTANEOUS at 08:50

## 2021-01-08 RX ADMIN — INSULIN LISPRO 2 UNITS: 100 INJECTION, SOLUTION INTRAVENOUS; SUBCUTANEOUS at 17:45

## 2021-01-08 RX ADMIN — SODIUM CHLORIDE, PRESERVATIVE FREE 10 ML: 5 INJECTION INTRAVENOUS at 08:39

## 2021-01-08 RX ADMIN — IOPAMIDOL 100 ML: 755 INJECTION, SOLUTION INTRAVENOUS at 06:11

## 2021-01-08 NOTE — PROGRESS NOTES
Discharge Planning Assessment  [facility]     Patient Name: Wesley Eduardo  MRN: [id number]  Today's Date: 1/8/2021    Admit Date: 1/7/2021    Discharge Needs Assessment     Row Name 01/08/21 1715       Living Environment    Lives With  spouse    Name(s) of Who Lives With Patient  NATHAN    Current Living Arrangements  home/apartment/condo    Primary Care Provided by  self    Provides Primary Care For  no one    Family Caregiver if Needed  spouse    Quality of Family Relationships  helpful;involved;supportive    Able to Return to Prior Arrangements  yes       Resource/Environmental Concerns    Resource/Environmental Concerns  none    Transportation Concerns  car, none       Transition Planning    Patient/Family Anticipates Transition to  home    Transportation Anticipated  family or friend will provide       Discharge Needs Assessment    Readmission Within the Last 30 Days  no previous admission in last 30 days    Equipment Currently Used at Home  none    Concerns to be Addressed  denies needs/concerns at this time    Equipment Needed After Discharge  none        Discharge Plan     Row Name 01/08/21 1716       Plan    Plan  HOME    Patient/Family in Agreement with Plan  yes    Plan Comments  PT. REPORTS HE LIVES INDEPENDENTLY AT HOME WITH HIS WIFE.  PT. GETS RX FROM THE [facility]; WILL FAX D/C SUMMARY TO CLINIC WHEN REC'D.  PT. DENIES ANY IDENTIFIED D/C PLANNING NEEDS AT PRESENT, HOWEVER NOTED PT. WILL HAVE CABG AND WILL CONT. TO FOLLOW FOR ANY POST OP D/C NEEDS THAT MAY ARISE.        Continued Care and Services - Admitted Since 1/7/2021    Coordination has not been started for this encounter.         Demographic Summary    No documentation.       Functional Status    No documentation.       Psychosocial    No documentation.       Abuse/Neglect    No documentation.       Legal    No documentation.       Substance Abuse    No documentation.       Patient Forms    No documentation.           Kisha Norwood,  LSW

## 2021-01-08 NOTE — H&P
"    [facility] Medicine Services  HISTORY AND PHYSICAL    Date of Admission: 1/7/2021  Primary Care Physician: Alfredito Land MD    Subjective     Chief Complaint: NASH    History of Present Illness  75-year-old man who presents to [facility] with shortness of breath.  Worse with exertion.  He was transferred to our ER facility.  He was found to have elevated troponin.  The patient tells me that his shortness of breath has been going on since last fall.  Today he was putting out a with his tractor for his horses, and developed shortness of breath to the point that he states \"I could not go.\"He denies any lower extremity edema.  He denies any acute bowel or kidney dysfunction.  He denies any diaphoresis.  He states that he has not had any cardiac complications in the past, nothing like this is happened to him before.        Review of Systems   NASH    Otherwise complete ROS reviewed and negative except as mentioned in the HPI.    Past Medical History:   Past Medical History:   Diagnosis Date   • Diabetes mellitus (CMS/HCC)    • Hyperlipidemia    • Hypertension      Past Surgical History:  Past Surgical History:   Procedure Laterality Date   • HEMORRHOIDECTOMY       Social History:  reports that he has never smoked. He has never used smokeless tobacco. He reports that he does not drink alcohol or use drugs.    Family History: HTN    Allergies:  Allergies   Allergen Reactions   • Sulfa Antibiotics Angioedema   • Quine [Quinine] Unknown - Low Severity     Medications:  Prior to Admission medications    Medication Sig Start Date End Date Taking? Authorizing Provider   amitriptyline (ELAVIL) 100 MG tablet Take 100 mg by mouth Every Night.   Yes ProviderNiyah MD   amLODIPine (NORVASC) 5 MG tablet Take 5 mg by mouth Daily.   Yes ProviderNiyah MD   clopidogrel (PLAVIX) 75 MG tablet Take 75 mg by mouth Daily.   Yes ProviderNiyah MD   gabapentin " "(NEURONTIN) 600 MG tablet Take 600 mg by mouth 3 (Three) Times a Day.   Yes Niyah Angel MD   indapamide (LOZOL) 1.25 MG tablet Take 1.25 mg by mouth Every Morning.   Yes Niyah Angel MD   metFORMIN (GLUCOPHAGE) 1000 MG tablet Take 1,000 mg by mouth 2 (Two) Times a Day With Meals.   Yes Niyah Angel MD   raNITIdine (ZANTAC) 75 MG tablet Take 300 mg by mouth 2 (Two) Times a Day.   Yes Niyah Angel MD   tamsulosin (FLOMAX) 0.4 MG capsule 24 hr capsule Take 1 capsule by mouth Daily.   Yes Niyah Angel MD     Objective     Vital Signs: /72 (BP Location: Left arm, Patient Position: Lying)   Pulse 113   Temp 98.1 °F (36.7 °C) (Oral)   Resp 20   Ht 175.3 cm (69\")   Wt 90.7 kg (200 lb)   SpO2 90%   BMI 29.53 kg/m²   Physical Exam  Vitals signs reviewed.   Constitutional:       Appearance: He is ill-appearing.   HENT:      Head: Normocephalic and atraumatic.      Nose: Nose normal.   Eyes:      Conjunctiva/sclera: Conjunctivae normal.   Neck:      Musculoskeletal: Normal range of motion and neck supple.   Cardiovascular:      Rate and Rhythm: Normal rate and regular rhythm.      Heart sounds: Normal heart sounds.   Pulmonary:      Breath sounds: Rhonchi present.      Comments: Tachypnea  Abdominal:      General: Bowel sounds are normal.      Palpations: Abdomen is soft.   Musculoskeletal:         General: No swelling or tenderness.   Skin:     General: Skin is warm and dry.   Neurological:      General: No focal deficit present.      Mental Status: He is alert.      Cranial Nerves: No cranial nerve deficit.   Psychiatric:         Mood and Affect: Mood normal.         Behavior: Behavior normal.        Results Reviewed:  Lab Results (last 24 hours)     Procedure Component Value Units Date/Time    Lactic Acid, Reflex [[id number]]  (Normal) Collected: 01/08/21 0519    Specimen: Blood Updated: 01/08/21 0542     Lactate 1.7 mmol/L     Lactic Acid, Reflex Timer (This will " reflex a repeat order 3-3:15 hours after ordered.) [[id number]] Collected: 01/08/21 0046    Specimen: Blood Updated: 01/08/21 0430     Hold Tube Hold for add-ons.     Comment: Auto resulted.       Troponin [[id number]]  (Abnormal) Collected: 01/08/21 0333    Specimen: Blood Updated: 01/08/21 0401     Troponin T 0.257 ng/mL     Narrative:      Troponin T Reference Range:  <= 0.03 ng/mL-   Negative for AMI  >0.03 ng/mL-     Abnormal for myocardial necrosis.  Clinicians would have to utilize clinical acumen, EKG, Troponin and serial changes to determine if it is an Acute Myocardial Infarction or myocardial injury due to an underlying chronic condition.       Results may be falsely decreased if patient taking Biotin.      COVID PRE-OP / PRE-PROCEDURE SCREENING ORDER (NO ISOLATION) - Swab, Nasal Cavity [[id number]]  (Normal) Collected: 01/08/21 0052    Specimen: Swab from Nasal Cavity Updated: 01/08/21 0148    Narrative:      The following orders were created for panel order COVID PRE-OP / PRE-PROCEDURE SCREENING ORDER (NO ISOLATION) - Swab, Nasal Cavity.  Procedure                               Abnormality         Status                     ---------                               -----------         ------                     COVID-19,[facility] [employer] [facility]...[[id number]]  Normal              Final result                 Please view results for these tests on the individual orders.    COVID-19,[facility] [employer] IN-HOUSE,Nasal Swab No Transport Media 3-4 HR TAT - Swab, Nasal Cavity [[id number]]  (Normal) Collected: 01/08/21 0052    Specimen: Swab from Nasal Cavity Updated: 01/08/21 0148     COVID19 Not Detected    Narrative:      Fact sheet for providers: https://www.fda.gov/media/525495/download     Fact sheet for patients: https://www.fda.gov/media/511524/download    Test performed by PCR.    Lactic Acid, Plasma [[id number]]  (Abnormal) Collected: 01/08/21 0046    Specimen: Blood Updated: 01/08/21 0124     Lactate 2.1 mmol/L     Troponin  "[[id number]]  (Abnormal) Collected: 01/08/21 0046    Specimen: Blood Updated: 01/08/21 0123     Troponin T 0.159 ng/mL     Narrative:      Troponin T Reference Range:  <= 0.03 ng/mL-   Negative for AMI  >0.03 ng/mL-     Abnormal for myocardial necrosis.  Clinicians would have to utilize clinical acumen, EKG, Troponin and serial changes to determine if it is an Acute Myocardial Infarction or myocardial injury due to an underlying chronic condition.       Results may be falsely decreased if patient taking Biotin.      Procalcitonin [[id number]]  (Normal) Collected: 01/08/21 0046    Specimen: Blood Updated: 01/08/21 0123     Procalcitonin 0.15 ng/mL     Narrative:      As a Marker for Sepsis (Non-Neonates):   1. <0.5 ng/mL represents a low risk of severe sepsis and/or septic shock.  1. >2 ng/mL represents a high risk of severe sepsis and/or septic shock.    As a Marker for Lower Respiratory Tract Infections that require antibiotic therapy:  PCT on Admission     Antibiotic Therapy             6-12 Hrs later  > 0.5                Strongly Recommended            >0.25 - <0.5         Recommended  0.1 - 0.25           Discouraged                   Remeasure/reassess PCT  <0.1                 Strongly Discouraged          Remeasure/reassess PCT      As 28 day mortality risk marker: \"Change in Procalcitonin Result\" (> 80 % or <=80 %) if Day 0 (or Day 1) and Day 4 values are available. Refer to http://www.[facility]-pct-calculator.com/   Change in PCT <=80 %   A decrease of PCT levels below or equal to 80 % defines a positive change in PCT test result representing a higher risk for 28-day all-cause mortality of patients diagnosed with severe sepsis or septic shock.  Change in PCT > 80 %   A decrease of PCT levels of more than 80 % defines a negative change in PCT result representing a lower risk for 28-day all-cause mortality of patients diagnosed with severe sepsis or septic shock.                Results may be falsely decreased if " patient taking Biotin.     Comprehensive Metabolic Panel [[id number]]  (Abnormal) Collected: 01/08/21 0046    Specimen: Blood Updated: 01/08/21 0118     Glucose 230 mg/dL      BUN 20 mg/dL      Creatinine 0.76 mg/dL      Sodium 134 mmol/L      Potassium 5.0 mmol/L      Chloride 98 mmol/L      CO2 23.0 mmol/L      Calcium 10.1 mg/dL      Total Protein 7.6 g/dL      Albumin 4.60 g/dL      ALT (SGPT) 17 U/L      AST (SGOT) 29 U/L      Alkaline Phosphatase 68 U/L      Total Bilirubin 0.5 mg/dL      eGFR Non African Amer 101 mL/min/1.73      Globulin 3.0 gm/dL      A/G Ratio 1.5 g/dL      BUN/Creatinine Ratio 26.3     Anion Gap 13.0 mmol/L     Narrative:      GFR Normal >60  Chronic Kidney Disease <60  Kidney Failure <15      Protime-INR [[id number]]  (Normal) Collected: 01/08/21 0046    Specimen: Blood Updated: 01/08/21 0115     Protime 13.1 Seconds      INR 1.03    BNP [[id number]]  (Abnormal) Collected: 01/08/21 0046    Specimen: Blood Updated: 01/08/21 0115     proBNP 1,608.0 pg/mL     Narrative:      Among patients with dyspnea, NT-proBNP is highly sensitive for the detection of acute congestive heart failure. In addition NT-proBNP of <300 pg/ml effectively rules out acute congestive heart failure with 99% negative predictive value.    Results may be falsely decreased if patient taking Biotin.      D-dimer, Quantitative [[id number]]  (Abnormal) Collected: 01/08/21 0046    Specimen: Blood Updated: 01/08/21 0115     D-Dimer, Quantitative 1.14 mg/L (FEU)     Narrative:      Reference Range is 0-0.50 mg/L FEU. However, results <0.50 mg/L FEU tends to rule out DVT or PE. Results >0.50 mg/L FEU are not useful in predicting absence or presence of DVT or PE.      Blood Culture - Blood, Arm, Left [[id number]] Collected: 01/08/21 0046    Specimen: Blood from Arm, Left Updated: 01/08/21 0057    Blood Culture - Blood, Arm, Right [[id number]] Collected: 01/08/21 0046    Specimen: Blood from Arm, Right Updated: 01/08/21 0057    CBC &  Differential [[id number]]  (Abnormal) Collected: 01/08/21 0046    Specimen: Blood Updated: 01/08/21 0057    Narrative:      The following orders were created for panel order CBC & Differential.  Procedure                               Abnormality         Status                     ---------                               -----------         ------                     CBC Auto Differential[[id number]]        Abnormal            Final result                 Please view results for these tests on the individual orders.    CBC Auto Differential [[id number]]  (Abnormal) Collected: 01/08/21 0046    Specimen: Blood Updated: 01/08/21 0057     WBC 18.43 10*3/mm3      RBC 5.36 10*6/mm3      Hemoglobin 15.5 g/dL      Hematocrit 45.3 %      MCV 84.5 fL      MCH 28.9 pg      MCHC 34.2 g/dL      RDW 12.7 %      RDW-SD 38.8 fl      MPV 10.9 fL      Platelets 307 10*3/mm3      Neutrophil % 77.1 %      Lymphocyte % 10.0 %      Monocyte % 11.8 %      Eosinophil % 0.1 %      Basophil % 0.4 %      Immature Grans % 0.6 %      Neutrophils, Absolute 14.22 10*3/mm3      Lymphocytes, Absolute 1.84 10*3/mm3      Monocytes, Absolute 2.18 10*3/mm3      Eosinophils, Absolute 0.01 10*3/mm3      Basophils, Absolute 0.07 10*3/mm3      Immature Grans, Absolute 0.11 10*3/mm3      nRBC 0.0 /100 WBC         Imaging Results (Last 24 Hours)     Procedure Component Value Units Date/Time    CT Angiogram Chest [[id number]] Resulted: 01/08/21 0618     Updated: 01/08/21 0626    XR Chest 1 View [[id number]] Resulted: 01/08/21 0053     Updated: 01/08/21 0103        I have personally reviewed and interpreted the radiology studies and ECG obtained at time of admission.     Assessment / Plan     Assessment:   Active Hospital Problems    Diagnosis   • NSTEMI (non-ST elevated myocardial infarction) (CMS/[facility], located within [facility])     Impression:  1. NSTEMI  2. Volume overload  3. DM 2 with hyperglycemia  4. Dyspnea on exertion  5. Mild hyponatremia  6. Hypertension    Plan:   1. Telemetry  2.  IV lasix  3. IO daily weight  4. Trend CM  5. Cardiac echo  6. Consult cardiology    7. Hold glucophage and start SSI  8. Labs in the am     Code Status: Full     I discussed the patient's findings and my recommendations with the patient    Estimated length of stay 1-2 days    Patient seen and examined by me on 1/8/20.    Electronically signed by Manuel Jones DO, 01/08/21, 06:46 CST.

## 2021-01-08 NOTE — PLAN OF CARE
Goal Outcome Evaluation:     Progress: improving  Outcome Summary: VSS, now off vapotherm on on 6 liters/hiflo, urinating well, continues to get IV lasix, states breathing as improved, did cough up slight red tinge sputum, states chest heaviness feels better, troponins trending upward, cardiology in to see pt, possbile future heart cath per pt statement, c/o of chronic back pain prn tramadol given, safety maintained

## 2021-01-08 NOTE — CONSULTS
Referring Provider: No ref. provider found    Reason for Consultation: Elevated Troponin, NASH    Chief complaint:   Chief Complaint   Patient presents with   • Shortness of Breath       Subjective     History of present illness:  Wesley Eduardo is a 75 y.o. male with history of hypertension, hyperlipidemia, Type II diabetes mellitus, bilateral carotid disease (followed by vascular at [facility]), coronary artery disease (s/p [facility] in [place] in 1977; that wife reports was treated medically and received no intervention). Patient has been dealing with exertional dyspnea for 8 months or greater. He reports that he had dyspnea on exertion this summer when he was riding mowing his yard. He reports that he would stop and rest for 10-15 minutes and it would resolve. Patient reports that he started wearing a mask when mowing and noted improvement in the dyspnea.    Yesterday he was feeding his horses and experienced a sudden onset of worsening symptoms to the point where he had to stop and go inside. He rested for a while with no improvement. He went to a town meeting and when he returned home he was worse. Around 8 pm his wife took him to [facility] ER for evaluation. Patient reports that he had some chest pain when his shortness of breath was severe; rating his pain a 10/10 and describing it as a pressure. patient denies any radiation of pain. He denies any nausea or diaphoresis. On arrival to the ER the patient was found to be hypoxic and had increased work of breathing. Patient reports that he had improvement in dyspnea with oxygen and resolution of chest pain when breathing was improved. CXR reported to show pneumonia and WBC was 24,000. Troponin was elevated at 0.028. He was given Rocephin at [facility] and transferred here for pneumonia, sepsis and NSTEMI.   Once here patient was on 3L and O2 was noted to be 89% so he was moved to high flow. Workup here revealed EKG showing sinus Tachycardia. Ddimer of 1.14. BNP  1,608. Troponin of 0.159, 0.257, 0.292. Cr of 0.76, Na 134 and K 5.0  CXR showed bilateral airspace disease most concerning for pneumonia. CTA chest showed no evidence of pulmonary embolus. Pleural effusions noted.   Today patient lying in bed with wife at bedside. Patient reports that he is feeling better. He is currently on high flow oxygen via NC and O2 sats are 94-95%. Patient denies any further episodes of chest pain since admission and transfer to our facility. Patient denies any leg swelling, or PND. Patient denies noting any orthopnea prior to yesterday. Patient denies any heart racing or palpations. Patient denies any other episodes of chest pain. Patient has received Lasix with good urine response.     History  Past Medical History:   Diagnosis Date   • Diabetes mellitus (CMS/HCC)    • Hyperlipidemia    • Hypertension    ,   Past Surgical History:   Procedure Laterality Date   • HEMORRHOIDECTOMY     ,   History reviewed. No pertinent family history.,   Social History     Tobacco Use   • Smoking status: Never Smoker   • Smokeless tobacco: Never Used   Substance Use Topics   • Alcohol use: Never     Frequency: Never   • Drug use: Never   ,     Medications    Prior to Admission medications    Medication Sig Start Date End Date Taking? Authorizing Provider   amitriptyline (ELAVIL) 100 MG tablet Take 100 mg by mouth Every Night.   Yes Niyah Angel MD   amLODIPine (NORVASC) 5 MG tablet Take 5 mg by mouth Daily.   Yes Niyah Angel MD   clopidogrel (PLAVIX) 75 MG tablet Take 75 mg by mouth Daily.   Yes Niyah Angel MD   gabapentin (NEURONTIN) 600 MG tablet Take 600 mg by mouth 3 (Three) Times a Day.   Yes Niyah Angel MD   indapamide (LOZOL) 1.25 MG tablet Take 1.25 mg by mouth Every Morning.   Yes Niyah Angel MD   metFORMIN (GLUCOPHAGE) 1000 MG tablet Take 1,000 mg by mouth 2 (Two) Times a Day With Meals.   Yes Niyah Angel MD   raNITIdine (ZANTAC) 75 MG  tablet Take 300 mg by mouth 2 (Two) Times a Day.   Yes ProviderNiyah MD   tamsulosin (FLOMAX) 0.4 MG capsule 24 hr capsule Take 1 capsule by mouth Daily.   Yes Provider, MD Niyah       Current Facility-Administered Medications   Medication Dose Route Frequency Provider Last Rate Last Admin   • acetaminophen (TYLENOL) tablet 650 mg  650 mg Oral Q4H PRN Manuel Jones DO       • amitriptyline (ELAVIL) tablet 100 mg  100 mg Oral Nightly Manuel Jones DO       • amLODIPine (NORVASC) tablet 5 mg  5 mg Oral Daily Manuel Jones DO   5 mg at 01/08/21 0838   • atorvastatin (LIPITOR) tablet 20 mg  20 mg Oral Daily Manuel Jones DO   20 mg at 01/08/21 0838   • clopidogrel (PLAVIX) tablet 75 mg  75 mg Oral Daily Manuel Jones DO   75 mg at 01/08/21 0838   • dextrose (D50W) 25 g/ 50mL Intravenous Solution 25 g  25 g Intravenous Q15 Min PRN Manuel Jones DO       • dextrose (GLUTOSE) oral gel 15 g  15 g Oral Q15 Min PRN Manuel Jones DO       • famotidine (PEPCID) tablet 20 mg  20 mg Oral Daily Manuel Jones DO   20 mg at 01/08/21 0838   • furosemide (LASIX) injection 40 mg  40 mg Intravenous BID Magdiel Schultz DO       • gabapentin (NEURONTIN) capsule 600 mg  600 mg Oral Q8H Manuel Jones DO   600 mg at 01/08/21 0839   • glucagon (human recombinant) (GLUCAGEN DIAGNOSTIC) injection 1 mg  1 mg Subcutaneous Q15 Min PRN Manuel Jones DO       • indapamide (LOZOL) tablet 1.25 mg  1.25 mg Oral Q AM Manuel Jones DO       • insulin lispro (humaLOG, ADMELOG) injection 2-7 Units  2-7 Units Subcutaneous TID AC Manuel Jones DO   2 Units at 01/08/21 0850   • lisinopril (PRINIVIL,ZESTRIL) tablet 5 mg  5 mg Oral Q24H Manuel Jones DO   5 mg at 01/08/21 0838   • ondansetron (ZOFRAN) injection 4 mg  4 mg Intravenous Q6H PRN Manuel Jones DO       • sodium chloride 0.9 % flush 10 mL  10 mL Intravenous PRN Andreas Vizcarra MD       •  "sodium chloride 0.9 % flush 10 mL  10 mL Intravenous Q12H Manuel Jones DO   10 mL at 01/08/21 0839   • sodium chloride 0.9 % flush 10 mL  10 mL Intravenous PRN Manuel Jones DO       • tamsulosin (FLOMAX) 24 hr capsule 0.4 mg  0.4 mg Oral Daily Manuel Jones DO   0.4 mg at 01/08/21 0838       Allergies:  Sulfa antibiotics and Quine [quinine]    Review of Systems  Review of Systems   Cardiovascular: Positive for chest pain (no further chest pain since admission) and dyspnea on exertion. Negative for leg swelling, palpitations and paroxysmal nocturnal dyspnea.   Respiratory: Positive for shortness of breath.    All other systems reviewed and are negative.      Objective     Physical Exam:  Patient Vitals for the past 24 hrs:   BP Temp Temp src Pulse Resp SpO2 Height Weight   01/08/21 0800 -- -- -- -- -- 95 % -- --   01/08/21 0756 117/70 98.2 °F (36.8 °C) Oral 105 20 95 % -- --   01/08/21 0633 149/72 98.1 °F (36.7 °C) Oral 113 20 90 % 175.3 cm (69\") 90.2 kg (198 lb 12.8 oz)   01/08/21 0615 151/87 -- -- 108 -- 94 % -- --   01/08/21 0515 160/89 -- -- 106 -- 96 % -- --   01/08/21 0415 145/87 -- -- 106 -- 94 % -- --   01/08/21 0413 -- -- -- 107 -- 96 % -- --   01/08/21 0314 -- -- -- 102 -- 98 % -- --   01/08/21 0138 -- -- -- 108 -- 95 % -- --   01/07/21 2353 140/87 97.9 °F (36.6 °C) Oral 108 22 99 % 175.3 cm (69\") 90.7 kg (200 lb)       Intake/Output Summary (Last 24 hours) at 1/8/2021 1116  Last data filed at 1/8/2021 0928  Gross per 24 hour   Intake --   Output 1050 ml   Net -1050 ml       Vitals signs reviewed.   Constitutional:       General: Not in acute distress.     Appearance: Normal appearance. Well-developed.      Interventions: Nasal cannula in place.      Comments: High flow oxygen   Eyes:      Pupils: Pupils are equal, round, and reactive to light.   HENT:      Head: Normocephalic and atraumatic.      Nose: Nose normal.   Neck:      Musculoskeletal: Normal range of motion and neck supple.    "   Vascular: No carotid bruit.   Pulmonary:      Effort: Pulmonary effort is normal. No respiratory distress.      Breath sounds: No wheezing. Rhonchi present. Rales present.   Cardiovascular:      Tachycardia present. Regular rhythm.      Murmurs: There is no murmur.   Edema:     Peripheral edema absent.   Abdominal:      General: There is no distension.      Palpations: Abdomen is soft.   Musculoskeletal: Normal range of motion.   Skin:     General: Skin is warm.      Findings: No erythema or rash.   Neurological:      Mental Status: Alert and oriented to person, place, and time.   Psychiatric:         Attention and Perception: Attention normal.         Mood and Affect: Mood normal.         Speech: Speech normal.         Behavior: Behavior normal.         Thought Content: Thought content normal.         Judgment: Judgment normal.         Telemetry: sinus tachycardia rate of 103 currently; rates 101-110 today.     Results Review:   I reviewed the patient's new clinical results.    Lab Results (last 72 hours)     Procedure Component Value Units Date/Time    Troponin [[id number]]  (Abnormal) Collected: 01/08/21 0800    Specimen: Blood Updated: 01/08/21 0856     Troponin T 0.292 ng/mL     Narrative:      Troponin T Reference Range:  <= 0.03 ng/mL-   Negative for AMI  >0.03 ng/mL-     Abnormal for myocardial necrosis.  Clinicians would have to utilize clinical acumen, EKG, Troponin and serial changes to determine if it is an Acute Myocardial Infarction or myocardial injury due to an underlying chronic condition.       Results may be falsely decreased if patient taking Biotin.      POC Glucose Once [[id number]]  (Abnormal) Collected: 01/08/21 0759    Specimen: Blood Updated: 01/08/21 0834     Glucose 191 mg/dL      Comment: : [id number] [place] LisaMeter ID: [id number]       Lactic Acid, Reflex [[id number]]  (Normal) Collected: 01/08/21 0519    Specimen: Blood Updated: 01/08/21 0542     Lactate 1.7 mmol/L     Lactic Acid,  Reflex Timer (This will reflex a repeat order 3-3:15 hours after ordered.) [[id number]] Collected: 01/08/21 0046    Specimen: Blood Updated: 01/08/21 0430     Hold Tube Hold for add-ons.     Comment: Auto resulted.       Troponin [[id number]]  (Abnormal) Collected: 01/08/21 0333    Specimen: Blood Updated: 01/08/21 0401     Troponin T 0.257 ng/mL     Narrative:      Troponin T Reference Range:  <= 0.03 ng/mL-   Negative for AMI  >0.03 ng/mL-     Abnormal for myocardial necrosis.  Clinicians would have to utilize clinical acumen, EKG, Troponin and serial changes to determine if it is an Acute Myocardial Infarction or myocardial injury due to an underlying chronic condition.       Results may be falsely decreased if patient taking Biotin.      COVID PRE-OP / PRE-PROCEDURE SCREENING ORDER (NO ISOLATION) - Swab, Nasal Cavity [[id number]]  (Normal) Collected: 01/08/21 0052    Specimen: Swab from Nasal Cavity Updated: 01/08/21 0148    Narrative:      The following orders were created for panel order COVID PRE-OP / PRE-PROCEDURE SCREENING ORDER (NO ISOLATION) - Swab, Nasal Cavity.  Procedure                               Abnormality         Status                     ---------                               -----------         ------                     COVID-19,[facility]...[[id number]]  Normal              Final result                 Please view results for these tests on the individual orders.    COVID-19,[facility] IN-HOUSE,Nasal Swab No Transport Media 3-4 HR TAT - Swab, Nasal Cavity [[id number]]  (Normal) Collected: 01/08/21 0052    Specimen: Swab from Nasal Cavity Updated: 01/08/21 0148     COVID19 Not Detected    Narrative:      Fact sheet for providers: https://www.fda.gov/media/662623/download     Fact sheet for patients: https://www.fda.gov/media/966549/download    Test performed by PCR.    Lactic Acid, Plasma [[id number]]  (Abnormal) Collected: 01/08/21 0046    Specimen: Blood Updated: 01/08/21 0124     Lactate  "2.1 mmol/L     Troponin [[id number]]  (Abnormal) Collected: 01/08/21 0046    Specimen: Blood Updated: 01/08/21 0123     Troponin T 0.159 ng/mL     Narrative:      Troponin T Reference Range:  <= 0.03 ng/mL-   Negative for AMI  >0.03 ng/mL-     Abnormal for myocardial necrosis.  Clinicians would have to utilize clinical acumen, EKG, Troponin and serial changes to determine if it is an Acute Myocardial Infarction or myocardial injury due to an underlying chronic condition.       Results may be falsely decreased if patient taking Biotin.      Procalcitonin [[id number]]  (Normal) Collected: 01/08/21 0046    Specimen: Blood Updated: 01/08/21 0123     Procalcitonin 0.15 ng/mL     Narrative:      As a Marker for Sepsis (Non-Neonates):   1. <0.5 ng/mL represents a low risk of severe sepsis and/or septic shock.  1. >2 ng/mL represents a high risk of severe sepsis and/or septic shock.    As a Marker for Lower Respiratory Tract Infections that require antibiotic therapy:  PCT on Admission     Antibiotic Therapy             6-12 Hrs later  > 0.5                Strongly Recommended            >0.25 - <0.5         Recommended  0.1 - 0.25           Discouraged                   Remeasure/reassess PCT  <0.1                 Strongly Discouraged          Remeasure/reassess PCT      As 28 day mortality risk marker: \"Change in Procalcitonin Result\" (> 80 % or <=80 %) if Day 0 (or Day 1) and Day 4 values are available. Refer to http://www.[employer]-pct-calculator.com/   Change in PCT <=80 %   A decrease of PCT levels below or equal to 80 % defines a positive change in PCT test result representing a higher risk for 28-day all-cause mortality of patients diagnosed with severe sepsis or septic shock.  Change in PCT > 80 %   A decrease of PCT levels of more than 80 % defines a negative change in PCT result representing a lower risk for 28-day all-cause mortality of patients diagnosed with severe sepsis or septic shock.                Results may " be falsely decreased if patient taking Biotin.     Comprehensive Metabolic Panel [[id number]]  (Abnormal) Collected: 01/08/21 0046    Specimen: Blood Updated: 01/08/21 0118     Glucose 230 mg/dL      BUN 20 mg/dL      Creatinine 0.76 mg/dL      Sodium 134 mmol/L      Potassium 5.0 mmol/L      Chloride 98 mmol/L      CO2 23.0 mmol/L      Calcium 10.1 mg/dL      Total Protein 7.6 g/dL      Albumin 4.60 g/dL      ALT (SGPT) 17 U/L      AST (SGOT) 29 U/L      Alkaline Phosphatase 68 U/L      Total Bilirubin 0.5 mg/dL      eGFR Non African Amer 101 mL/min/1.73      Globulin 3.0 gm/dL      A/G Ratio 1.5 g/dL      BUN/Creatinine Ratio 26.3     Anion Gap 13.0 mmol/L     Narrative:      GFR Normal >60  Chronic Kidney Disease <60  Kidney Failure <15      Protime-INR [[id number]]  (Normal) Collected: 01/08/21 0046    Specimen: Blood Updated: 01/08/21 0115     Protime 13.1 Seconds      INR 1.03    BNP [[id number]]  (Abnormal) Collected: 01/08/21 0046    Specimen: Blood Updated: 01/08/21 0115     proBNP 1,608.0 pg/mL     Narrative:      Among patients with dyspnea, NT-proBNP is highly sensitive for the detection of acute congestive heart failure. In addition NT-proBNP of <300 pg/ml effectively rules out acute congestive heart failure with 99% negative predictive value.    Results may be falsely decreased if patient taking Biotin.      D-dimer, Quantitative [[id number]]  (Abnormal) Collected: 01/08/21 0046    Specimen: Blood Updated: 01/08/21 0115     D-Dimer, Quantitative 1.14 mg/L (FEU)     Narrative:      Reference Range is 0-0.50 mg/L FEU. However, results <0.50 mg/L FEU tends to rule out DVT or PE. Results >0.50 mg/L FEU are not useful in predicting absence or presence of DVT or PE.      Blood Culture - Blood, Arm, Left [[id number]] Collected: 01/08/21 0046    Specimen: Blood from Arm, Left Updated: 01/08/21 0057    Blood Culture - Blood, Arm, Right [[id number]] Collected: 01/08/21 0046    Specimen: Blood from Arm, Right Updated:  01/08/21 0057    CBC & Differential [[id number]]  (Abnormal) Collected: 01/08/21 0046    Specimen: Blood Updated: 01/08/21 0057    Narrative:      The following orders were created for panel order CBC & Differential.  Procedure                               Abnormality         Status                     ---------                               -----------         ------                     CBC Auto Differential[[id number]]        Abnormal            Final result                 Please view results for these tests on the individual orders.    CBC Auto Differential [[id number]]  (Abnormal) Collected: 01/08/21 0046    Specimen: Blood Updated: 01/08/21 0057     WBC 18.43 10*3/mm3      RBC 5.36 10*6/mm3      Hemoglobin 15.5 g/dL      Hematocrit 45.3 %      MCV 84.5 fL      MCH 28.9 pg      MCHC 34.2 g/dL      RDW 12.7 %      RDW-SD 38.8 fl      MPV 10.9 fL      Platelets 307 10*3/mm3      Neutrophil % 77.1 %      Lymphocyte % 10.0 %      Monocyte % 11.8 %      Eosinophil % 0.1 %      Basophil % 0.4 %      Immature Grans % 0.6 %      Neutrophils, Absolute 14.22 10*3/mm3      Lymphocytes, Absolute 1.84 10*3/mm3      Monocytes, Absolute 2.18 10*3/mm3      Eosinophils, Absolute 0.01 10*3/mm3      Basophils, Absolute 0.07 10*3/mm3      Immature Grans, Absolute 0.11 10*3/mm3      nRBC 0.0 /100 WBC           No results found for: ECHOEFEST    Imaging Results (Last 72 Hours)     Procedure Component Value Units Date/Time    CT Angiogram Chest [[id number]] Collected: 01/08/21 0804     Updated: 01/08/21 0811    Narrative:      CT ANGIOGRAM CHEST- 1/8/2021 6:18 AM CST      HISTORY: dyspnea; I21.4-Non-ST elevation (NSTEMI) myocardial infarction;  I50.9-Heart failure, unspecified      COMPARISON: None.      Radiation dose equals  mGy-cm.  Automated exposure control dose  reduction technique was implemented.        TECHNIQUE: Helical tomographic images of the chest were obtained after  the administration of intravenous contrast  following angiogram protocol.  Additionally, 3D reformatted images were provided.        FINDINGS:    Pulmonary arteries: There is adequate enhancement of the pulmonary  arteries to evaluate for central and segmental pulmonary emboli. There  are no filling defects within the main, lobar, segmental or visualized  subsegmental pulmonary arteries. The pulmonary arteries are within  normal limits.      Aorta and great vessels: The aorta is well opacified and demonstrates no  dissection or aneurysm. The great vessels are normal in appearance.     Visualized neck base: The imaged portion of the base of the neck appears  unremarkable.      Lungs: Small bilateral pleural effusions. Additionally, there is central  groundglass opacities, with interlobular septal thickening, most  concerning for pulmonary edema.. The trachea and bronchial tree are  patent.      Heart: The heart is normal in size. There is no pericardial effusion.      Mediastinum and lymph nodes: No enlarged mediastinal, hilar, or axillary  lymph nodes are present.      Skeletal and soft tissues: The osseous structures of the thorax and  surrounding soft tissues demonstrate no acute process.     Upper abdomen: The imaged portion of the upper abdomen demonstrates no  acute process.        Impression:      1. No evidence of pulmonary embolus.  2. Pleural effusions, central groundglass opacities and interlobular  septal thickening, most concerning for fluid overload. Superimposed  infection is not excluded.        This report was finalized on 01/08/2021 08:08 by Dr. Suly Bella MD.    XR Chest 1 View [[id number]] Collected: 01/08/21 0749     Updated: 01/08/21 0753    Narrative:      EXAMINATION: XR CHEST 1 VW- 1/8/2021 7:49 AM CST     HISTORY: Dyspnea     COMPARISON: None     FINDINGS:  The heart and mediastinal contours appear normal. Bilateral interstitial  and alveolar opacities are present, most pronounced in the perihilar  regions. There is no appreciable  pneumothorax or pleural effusion. The  pulmonary vasculature is indistinct.       Impression:      Bilateral airspace disease most concerning for pneumonia.  This report was finalized on 01/08/2021 07:50 by Dr. Octavio Gomez MD.        Assessment/Plan      -NSTEMI: Elevated troponin of 0.159, 0.257, 0.292, 0.303; has remained flat trend since admission. Patient reports chest pain yesterday in setting of severe respiratory distress that improved with oxygen and improvement in breathing; he denies any further episodes of chest pain. EKG shows no acute changes. Dyspnea on exertion and chest pain may be concerning for cardiac source however patient is still on high flow oxygen the am and chest pain free. Will continue to monitor for chest pain and continue to diurese patient. May need to consider further cardiac work up when breathing stabilizes more.     -Pneumonia/Sepsis: Elevated WBC, lactate of 2.1, treatement per primary team    -Acute pulmonary edema: BNP 1, 608. CTA showing pleural effusions. Echo pending. Patient received lasix 40 IV with good urine response    -Hypertension: controlled     -Hyperlipidemia: on statin therapy.     -Diabetes Mellitus:       Plan:   - Echo pending  - continue to trend troponin  - Agree with diuresing patient with Lasix  - Continue Telemetry  - continue to monitor kidney function and electrolytes  - low sodium diet, fluid restriction, I/O and daily weights      Further orders per Dr Hong    Thank you for asking us to follow this patient with you.     Please note this cardiology consultation note is the result of a face to face consultation with the patient, in addition to reviewing medical records at length by myself, ANGEL Garcia.    Electronically signed by ANGEL Garcia, 01/08/21, 10:51 AM CST.      Time: 45 minutes

## 2021-01-08 NOTE — ED PROVIDER NOTES
Subjective   75-year-old male presents to the ER as transfer from [facility] with a diagnosis of pneumonia, sepsis, nstemi.  History of hypertension, hyperlipidemia, diabetes.  Last echo in 2016 showed LVH.  Patient states he has been dealing with exertional dyspnea for the past 6 to 8 months.  Today while feeding his horses some hay he developed onset of shortness of breath that was moderate to severe.  Patient states he went inside and rested but symptoms did not improve so around 8 PM his wife took him to the emergency department for evaluation.  On arrival to the ER, patient was found to be hypoxic and had increased work of breathing.  EKG was obtained showed LVH with expected repolarization abnormalities.  Chest x-ray not available but per staff at the outside hospital showed pneumonia.  White blood cell count was 24,000.  Patient was given dose of Rocephin.  Troponin 0 0.28.  Given the repolarization changes on EKG the provider was concerned about questionable ST elevations V1 V2.  Prior to the ER, patient states he no longer has shortness of breath.  He denies ever having chest pain, nausea, diaphoresis.  States his shortness of breath is worse with exertion and improved after he received oxygen via nonrebreather at the outside facility.      History provided by:  Patient      Review of Systems   All other systems reviewed and are negative.      Past Medical History:   Diagnosis Date   • Diabetes mellitus (CMS/HCC)    • Hyperlipidemia    • Hypertension        Allergies   Allergen Reactions   • Sulfa Antibiotics Angioedema   • Quine [Quinine] Unknown - Low Severity       Past Surgical History:   Procedure Laterality Date   • HEMORRHOIDECTOMY         History reviewed. No pertinent family history.    Social History     Socioeconomic History   • Marital status:      Spouse name: Not on file   • Number of children: Not on file   • Years of education: Not on file   • Highest education level:  Not on file   Tobacco Use   • Smoking status: Never Smoker   • Smokeless tobacco: Never Used   Substance and Sexual Activity   • Alcohol use: Never     Frequency: Never   • Drug use: Never   • Sexual activity: Defer           Objective   Physical Exam  Vitals signs and nursing note reviewed.   Constitutional:       General: He is not in acute distress.     Appearance: He is normal weight. He is not ill-appearing.   HENT:      Head: Normocephalic and atraumatic.   Eyes:      Extraocular Movements: Extraocular movements intact.      Pupils: Pupils are equal, round, and reactive to light.   Neck:      Musculoskeletal: Normal range of motion and neck supple.      Vascular: No JVD.   Cardiovascular:      Rate and Rhythm: Regular rhythm. Tachycardia present.   Pulmonary:      Effort: Pulmonary effort is normal.      Breath sounds: Rales present. No wheezing.   Chest:      Chest wall: No mass or deformity.   Abdominal:      General: Bowel sounds are normal.      Palpations: Abdomen is soft.      Tenderness: There is no abdominal tenderness. There is no guarding or rebound.   Musculoskeletal: Normal range of motion.      Right lower leg: He exhibits no tenderness. No edema.      Left lower leg: He exhibits no tenderness. No edema.   Skin:     General: Skin is warm and dry.      Capillary Refill: Capillary refill takes less than 2 seconds.   Neurological:      General: No focal deficit present.      Mental Status: He is alert and oriented to person, place, and time.      Cranial Nerves: No cranial nerve deficit.      Motor: No weakness.         ECG 12 Lead      Date/Time: 1/8/2021 12:37 AM  Performed by: Andreas Vizcarra MD  Authorized by: Andreas Vizcarra MD   Interpreted by physician  Comments: Sinus tachycardia, rate 107, LVH with repolarization abnormalities, no evidence of ST elevation MI, possible left atrial enlargement, age-indeterminate septal infarct, abnormal EKG               Lab Results (last 24  hours)     Procedure Component Value Units Date/Time    CBC & Differential [[id number]]  (Abnormal) Collected: 01/08/21 0046    Specimen: Blood Updated: 01/08/21 0057    Narrative:      The following orders were created for panel order CBC & Differential.  Procedure                               Abnormality         Status                     ---------                               -----------         ------                     CBC Auto Differential[[id number]]        Abnormal            Final result                 Please view results for these tests on the individual orders.    Comprehensive Metabolic Panel [[id number]]  (Abnormal) Collected: 01/08/21 0046    Specimen: Blood Updated: 01/08/21 0118     Glucose 230 mg/dL      BUN 20 mg/dL      Creatinine 0.76 mg/dL      Sodium 134 mmol/L      Potassium 5.0 mmol/L      Chloride 98 mmol/L      CO2 23.0 mmol/L      Calcium 10.1 mg/dL      Total Protein 7.6 g/dL      Albumin 4.60 g/dL      ALT (SGPT) 17 U/L      AST (SGOT) 29 U/L      Alkaline Phosphatase 68 U/L      Total Bilirubin 0.5 mg/dL      eGFR Non African Amer 101 mL/min/1.73      Globulin 3.0 gm/dL      A/G Ratio 1.5 g/dL      BUN/Creatinine Ratio 26.3     Anion Gap 13.0 mmol/L     Narrative:      GFR Normal >60  Chronic Kidney Disease <60  Kidney Failure <15      Protime-INR [[id number]]  (Normal) Collected: 01/08/21 0046    Specimen: Blood Updated: 01/08/21 0115     Protime 13.1 Seconds      INR 1.03    BNP [[id number]]  (Abnormal) Collected: 01/08/21 0046    Specimen: Blood Updated: 01/08/21 0115     proBNP 1,608.0 pg/mL     Narrative:      Among patients with dyspnea, NT-proBNP is highly sensitive for the detection of acute congestive heart failure. In addition NT-proBNP of <300 pg/ml effectively rules out acute congestive heart failure with 99% negative predictive value.    Results may be falsely decreased if patient taking Biotin.      D-dimer, Quantitative [[id number]]  (Abnormal) Collected: 01/08/21 0046     Specimen: Blood Updated: 01/08/21 0115     D-Dimer, Quantitative 1.14 mg/L (FEU)     Narrative:      Reference Range is 0-0.50 mg/L FEU. However, results <0.50 mg/L FEU tends to rule out DVT or PE. Results >0.50 mg/L FEU are not useful in predicting absence or presence of DVT or PE.      Troponin [[id number]]  (Abnormal) Collected: 01/08/21 0046    Specimen: Blood Updated: 01/08/21 0123     Troponin T 0.159 ng/mL     Narrative:      Troponin T Reference Range:  <= 0.03 ng/mL-   Negative for AMI  >0.03 ng/mL-     Abnormal for myocardial necrosis.  Clinicians would have to utilize clinical acumen, EKG, Troponin and serial changes to determine if it is an Acute Myocardial Infarction or myocardial injury due to an underlying chronic condition.       Results may be falsely decreased if patient taking Biotin.      Blood Culture - Blood, Arm, Left [[id number]] Collected: 01/08/21 0046    Specimen: Blood from Arm, Left Updated: 01/08/21 0057    Blood Culture - Blood, Arm, Right [[id number]] Collected: 01/08/21 0046    Specimen: Blood from Arm, Right Updated: 01/08/21 0057    Lactic Acid, Plasma [[id number]]  (Abnormal) Collected: 01/08/21 0046    Specimen: Blood Updated: 01/08/21 0124     Lactate 2.1 mmol/L     Procalcitonin [[id number]]  (Normal) Collected: 01/08/21 0046    Specimen: Blood Updated: 01/08/21 0123     Procalcitonin 0.15 ng/mL     Narrative:      As a Marker for Sepsis (Non-Neonates):   1. <0.5 ng/mL represents a low risk of severe sepsis and/or septic shock.  1. >2 ng/mL represents a high risk of severe sepsis and/or septic shock.    As a Marker for Lower Respiratory Tract Infections that require antibiotic therapy:  PCT on Admission     Antibiotic Therapy             6-12 Hrs later  > 0.5                Strongly Recommended            >0.25 - <0.5         Recommended  0.1 - 0.25           Discouraged                   Remeasure/reassess PCT  <0.1                 Strongly Discouraged          Remeasure/reassess  "PCT      As 28 day mortality risk marker: \"Change in Procalcitonin Result\" (> 80 % or <=80 %) if Day 0 (or Day 1) and Day 4 values are available. Refer to http://www.[facility]-pct-calculator.com/   Change in PCT <=80 %   A decrease of PCT levels below or equal to 80 % defines a positive change in PCT test result representing a higher risk for 28-day all-cause mortality of patients diagnosed with severe sepsis or septic shock.  Change in PCT > 80 %   A decrease of PCT levels of more than 80 % defines a negative change in PCT result representing a lower risk for 28-day all-cause mortality of patients diagnosed with severe sepsis or septic shock.                Results may be falsely decreased if patient taking Biotin.     CBC Auto Differential [[id number]]  (Abnormal) Collected: 01/08/21 0046    Specimen: Blood Updated: 01/08/21 0057     WBC 18.43 10*3/mm3      RBC 5.36 10*6/mm3      Hemoglobin 15.5 g/dL      Hematocrit 45.3 %      MCV 84.5 fL      MCH 28.9 pg      MCHC 34.2 g/dL      RDW 12.7 %      RDW-SD 38.8 fl      MPV 10.9 fL      Platelets 307 10*3/mm3      Neutrophil % 77.1 %      Lymphocyte % 10.0 %      Monocyte % 11.8 %      Eosinophil % 0.1 %      Basophil % 0.4 %      Immature Grans % 0.6 %      Neutrophils, Absolute 14.22 10*3/mm3      Lymphocytes, Absolute 1.84 10*3/mm3      Monocytes, Absolute 2.18 10*3/mm3      Eosinophils, Absolute 0.01 10*3/mm3      Basophils, Absolute 0.07 10*3/mm3      Immature Grans, Absolute 0.11 10*3/mm3      nRBC 0.0 /100 WBC     Lactic Acid, Reflex Timer (This will reflex a repeat order 3-3:15 hours after ordered.) [[id number]] Collected: 01/08/21 0046    Specimen: Blood Updated: 01/08/21 0430     Hold Tube Hold for add-ons.     Comment: Auto resulted.       COVID PRE-OP / PRE-PROCEDURE SCREENING ORDER (NO ISOLATION) - Swab, Nasal Cavity [[id number]]  (Normal) Collected: 01/08/21 0052    Specimen: Swab from Nasal Cavity Updated: 01/08/21 0148    Narrative:      The following orders " were created for panel order COVID PRE-OP / PRE-PROCEDURE SCREENING ORDER (NO ISOLATION) - Swab, Nasal Cavity.  Procedure                               Abnormality         Status                     ---------                               -----------         ------                     COVID-19,[facility] [employer] [facility]...[[id number]]  Normal              Final result                 Please view results for these tests on the individual orders.    COVID-19,[employer] IN-HOUSE,Nasal Swab No Transport Media 3-4 HR TAT - Swab, Nasal Cavity [[id number]]  (Normal) Collected: 01/08/21 0052    Specimen: Swab from Nasal Cavity Updated: 01/08/21 0148     COVID19 Not Detected    Narrative:      Fact sheet for providers: https://www.fda.gov/media/509712/download     Fact sheet for patients: https://www.fda.gov/media/273018/download    Test performed by PCR.    Troponin [[id number]]  (Abnormal) Collected: 01/08/21 0333    Specimen: Blood Updated: 01/08/21 0401     Troponin T 0.257 ng/mL     Narrative:      Troponin T Reference Range:  <= 0.03 ng/mL-   Negative for AMI  >0.03 ng/mL-     Abnormal for myocardial necrosis.  Clinicians would have to utilize clinical acumen, EKG, Troponin and serial changes to determine if it is an Acute Myocardial Infarction or myocardial injury due to an underlying chronic condition.       Results may be falsely decreased if patient taking Biotin.      Lactic Acid, Reflex [[id number]]  (Normal) Collected: 01/08/21 0519    Specimen: Blood Updated: 01/08/21 0542     Lactate 1.7 mmol/L     POC Glucose Once [[id number]]  (Abnormal) Collected: 01/08/21 0759    Specimen: Blood Updated: 01/08/21 0834     Glucose 191 mg/dL      Comment: : [id number] Griggs LisaMeter ID: [id number]       Troponin [[id number]] Collected: 01/08/21 0800    Specimen: Blood Updated: 01/08/21 0831      Xr Chest 1 View    Result Date: 1/8/2021  EXAMINATION: XR CHEST 1 VW- 1/8/2021 7:49 AM CST  HISTORY: Dyspnea  COMPARISON: None  FINDINGS: The  heart and mediastinal contours appear normal. Bilateral interstitial and alveolar opacities are present, most pronounced in the perihilar regions. There is no appreciable pneumothorax or pleural effusion. The pulmonary vasculature is indistinct.      Bilateral airspace disease most concerning for pneumonia. This report was finalized on 01/08/2021 07:50 by Dr. Octavio Gomez MD.    Ct Angiogram Chest    Result Date: 1/8/2021  CT ANGIOGRAM CHEST- 1/8/2021 6:18 AM CST  HISTORY: dyspnea; I21.4-Non-ST elevation (NSTEMI) myocardial infarction; I50.9-Heart failure, unspecified  COMPARISON: None.  Radiation dose equals  mGy-cm.  Automated exposure control dose reduction technique was implemented.   TECHNIQUE: Helical tomographic images of the chest were obtained after the administration of intravenous contrast following angiogram protocol. Additionally, 3D reformatted images were provided.    FINDINGS:  Pulmonary arteries: There is adequate enhancement of the pulmonary arteries to evaluate for central and segmental pulmonary emboli. There are no filling defects within the main, lobar, segmental or visualized subsegmental pulmonary arteries. The pulmonary arteries are within normal limits.  Aorta and great vessels: The aorta is well opacified and demonstrates no dissection or aneurysm. The great vessels are normal in appearance.  Visualized neck base: The imaged portion of the base of the neck appears unremarkable.  Lungs: Small bilateral pleural effusions. Additionally, there is central groundglass opacities, with interlobular septal thickening, most concerning for pulmonary edema.. The trachea and bronchial tree are patent.  Heart: The heart is normal in size. There is no pericardial effusion.  Mediastinum and lymph nodes: No enlarged mediastinal, hilar, or axillary lymph nodes are present.  Skeletal and soft tissues: The osseous structures of the thorax and surrounding soft tissues demonstrate no acute process.   Upper abdomen: The imaged portion of the upper abdomen demonstrates no acute process.      1. No evidence of pulmonary embolus. 2. Pleural effusions, central groundglass opacities and interlobular septal thickening, most concerning for fluid overload. Superimposed infection is not excluded.   This report was finalized on 01/08/2021 08:08 by Dr. Suly Bella MD.    ED Course  ED Course as of Jan 08 0843 Fri Jan 08, 2021   0546 75-year-old male with history of hypertension hyperlipidemia and diabetes presents to the ED with complaint of exertional dyspnea, hypoxia with new oxygen requirement.   Will be admitted for nstemi, hypoxia, pna.    Pt reports intermittent episodes of exertional dyspnea over the past 6 to 8 months, worse today.  Seen at outside hospital and diagnosed with non-STEMI, pna and sepsis; received rocephin at outside facility prior to transfer.  In our ER, patient was symptom-free on arrival to the ED.  Was requiring high flow nasal cannula to maintain oxygen saturations above 92%.  He has been tachycardic and a little bit tachypneic.  D-dimer was elevated.  Initial troponin 0.159, repeat 0.257.  bnp 1608.  EKG shows sinus tach, LVH but no significant ST elevation.    White count 18,000, lactate 2.1.  D-dimer 1.14.  CTA ordered and pending.    We will treat as NSTEMI, give full dose Lovenox which should also cover if patient has a PE, give lasix for likely new onset chf and pulm edema, admit to the hospitalist service.        [AW]      ED Course User Index  [AW] Andreas Vizcarra MD                                         HEART Score (for prediction of 6-week risk of major adverse cardiac event) reviewed and/or performed as part of the patient evaluation and treatment planning process.  The result associated with this review/performance is: 8       MDM    Final diagnoses:   NSTEMI (non-ST elevated myocardial infarction) (CMS/HCC)   Acute congestive heart failure, unspecified heart failure  type (CMS/HCC)            Andreas Vizcarra MD  01/08/21 0821

## 2021-01-08 NOTE — ED NOTES
Pt O2 at 89% on 3 L NC. Notified MD and told to place on [facility]     Lei Grey RN  01/08/21 0133

## 2021-01-08 NOTE — PROGRESS NOTES
[facility]  INPATIENT PROGRESS NOTE    Length of Stay: 0  Date of Admission: 1/7/2021  Primary Care Physician: Alfredito Land MD    Subjective     Chief Complaint:     Shortness of breath    HPI     The patient remains short of breath and remains on Vapotherm at 20 L at 50%.  His shortness of breath is significantly improved.  Diuresis is ongoing.  Echocardiogram shows an ejection fraction of 30 to 40% with grade 1 diastolic dysfunction.  Troponin continues to uptrend and currently is 0.453.  I suspect the patient may end up proceeding to cardiac catheterization with cardiology.  Cardiology has consulted and continues to follow the patient.  Historically, the patient notes increasing swelling in his legs over the past month.  He normally has no edema whatsoever but over the past month or so he has noted that his socks make rings around his legs.    Review of Systems     All pertinent negatives and positives are as above. All other systems have been reviewed and are negative unless otherwise stated.     Objective    Temp:  [97.7 °F (36.5 °C)-98.2 °F (36.8 °C)] 97.7 °F (36.5 °C)  Heart Rate:  [102-113] 102  Resp:  [18-22] 18  BP: (117-160)/(59-89) 118/59    Lab Results (last 24 hours)     Procedure Component Value Units Date/Time    Troponin [[id number]]  (Abnormal) Collected: 01/08/21 1402    Specimen: Blood Updated: 01/08/21 1442     Troponin T 0.453 ng/mL     Narrative:      Troponin T Reference Range:  <= 0.03 ng/mL-   Negative for AMI  >0.03 ng/mL-     Abnormal for myocardial necrosis.  Clinicians would have to utilize clinical acumen, EKG, Troponin and serial changes to determine if it is an Acute Myocardial Infarction or myocardial injury due to an underlying chronic condition.       Results may be falsely decreased if patient taking Biotin.      POC Glucose Once [[id number]]  (Abnormal) Collected: 01/08/21 1140    Specimen: Blood Updated: 01/08/21  1224     Glucose 227 mg/dL      Comment: : [id number] [place] LisaMeter ID: [id number]       Troponin [[id number]]  (Abnormal) Collected: 01/08/21 1036    Specimen: Blood Updated: 01/08/21 1120     Troponin T 0.303 ng/mL     Narrative:      Troponin T Reference Range:  <= 0.03 ng/mL-   Negative for AMI  >0.03 ng/mL-     Abnormal for myocardial necrosis.  Clinicians would have to utilize clinical acumen, EKG, Troponin and serial changes to determine if it is an Acute Myocardial Infarction or myocardial injury due to an underlying chronic condition.       Results may be falsely decreased if patient taking Biotin.      Troponin [[id number]]  (Abnormal) Collected: 01/08/21 0800    Specimen: Blood Updated: 01/08/21 0856     Troponin T 0.292 ng/mL     Narrative:      Troponin T Reference Range:  <= 0.03 ng/mL-   Negative for AMI  >0.03 ng/mL-     Abnormal for myocardial necrosis.  Clinicians would have to utilize clinical acumen, EKG, Troponin and serial changes to determine if it is an Acute Myocardial Infarction or myocardial injury due to an underlying chronic condition.       Results may be falsely decreased if patient taking Biotin.      POC Glucose Once [[id number]]  (Abnormal) Collected: 01/08/21 0759    Specimen: Blood Updated: 01/08/21 0834     Glucose 191 mg/dL      Comment: : [id number] [place] LisaMeter ID: [id number]       Lactic Acid, Reflex [[id number]]  (Normal) Collected: 01/08/21 0519    Specimen: Blood Updated: 01/08/21 0542     Lactate 1.7 mmol/L     Lactic Acid, Reflex Timer (This will reflex a repeat order 3-3:15 hours after ordered.) [[id number]] Collected: 01/08/21 0046    Specimen: Blood Updated: 01/08/21 0430     Hold Tube Hold for add-ons.     Comment: Auto resulted.       Troponin [[id number]]  (Abnormal) Collected: 01/08/21 0333    Specimen: Blood Updated: 01/08/21 0401     Troponin T 0.257 ng/mL     Narrative:      Troponin T Reference Range:  <= 0.03 ng/mL-   Negative for AMI  >0.03 ng/mL-      Abnormal for myocardial necrosis.  Clinicians would have to utilize clinical acumen, EKG, Troponin and serial changes to determine if it is an Acute Myocardial Infarction or myocardial injury due to an underlying chronic condition.       Results may be falsely decreased if patient taking Biotin.      COVID PRE-OP / PRE-PROCEDURE SCREENING ORDER (NO ISOLATION) - Swab, Nasal Cavity [[id number]]  (Normal) Collected: 01/08/21 0052    Specimen: Swab from Nasal Cavity Updated: 01/08/21 0148    Narrative:      The following orders were created for panel order COVID PRE-OP / PRE-PROCEDURE SCREENING ORDER (NO ISOLATION) - Swab, Nasal Cavity.  Procedure                               Abnormality         Status                     ---------                               -----------         ------                     COVID-19,[facility]...[[id number]]  Normal              Final result                 Please view results for these tests on the individual orders.    COVID-19,[facility] IN-HOUSE,Nasal Swab No Transport Media 3-4 HR TAT - Swab, Nasal Cavity [[id number]]  (Normal) Collected: 01/08/21 0052    Specimen: Swab from Nasal Cavity Updated: 01/08/21 0148     COVID19 Not Detected    Narrative:      Fact sheet for providers: https://www.fda.gov/media/476435/download     Fact sheet for patients: https://www.fda.gov/media/149779/download    Test performed by PCR.    Lactic Acid, Plasma [[id number]]  (Abnormal) Collected: 01/08/21 0046    Specimen: Blood Updated: 01/08/21 0124     Lactate 2.1 mmol/L     Troponin [[id number]]  (Abnormal) Collected: 01/08/21 0046    Specimen: Blood Updated: 01/08/21 0123     Troponin T 0.159 ng/mL     Narrative:      Troponin T Reference Range:  <= 0.03 ng/mL-   Negative for AMI  >0.03 ng/mL-     Abnormal for myocardial necrosis.  Clinicians would have to utilize clinical acumen, EKG, Troponin and serial changes to determine if it is an Acute Myocardial Infarction or myocardial injury due to an  "underlying chronic condition.       Results may be falsely decreased if patient taking Biotin.      Procalcitonin [[id number]]  (Normal) Collected: 01/08/21 0046    Specimen: Blood Updated: 01/08/21 0123     Procalcitonin 0.15 ng/mL     Narrative:      As a Marker for Sepsis (Non-Neonates):   1. <0.5 ng/mL represents a low risk of severe sepsis and/or septic shock.  1. >2 ng/mL represents a high risk of severe sepsis and/or septic shock.    As a Marker for Lower Respiratory Tract Infections that require antibiotic therapy:  PCT on Admission     Antibiotic Therapy             6-12 Hrs later  > 0.5                Strongly Recommended            >0.25 - <0.5         Recommended  0.1 - 0.25           Discouraged                   Remeasure/reassess PCT  <0.1                 Strongly Discouraged          Remeasure/reassess PCT      As 28 day mortality risk marker: \"Change in Procalcitonin Result\" (> 80 % or <=80 %) if Day 0 (or Day 1) and Day 4 values are available. Refer to http://www.[employer]-pct-calculator.com/   Change in PCT <=80 %   A decrease of PCT levels below or equal to 80 % defines a positive change in PCT test result representing a higher risk for 28-day all-cause mortality of patients diagnosed with severe sepsis or septic shock.  Change in PCT > 80 %   A decrease of PCT levels of more than 80 % defines a negative change in PCT result representing a lower risk for 28-day all-cause mortality of patients diagnosed with severe sepsis or septic shock.                Results may be falsely decreased if patient taking Biotin.     Comprehensive Metabolic Panel [[id number]]  (Abnormal) Collected: 01/08/21 0046    Specimen: Blood Updated: 01/08/21 0118     Glucose 230 mg/dL      BUN 20 mg/dL      Creatinine 0.76 mg/dL      Sodium 134 mmol/L      Potassium 5.0 mmol/L      Chloride 98 mmol/L      CO2 23.0 mmol/L      Calcium 10.1 mg/dL      Total Protein 7.6 g/dL      Albumin 4.60 g/dL      ALT (SGPT) 17 U/L      AST " (SGOT) 29 U/L      Alkaline Phosphatase 68 U/L      Total Bilirubin 0.5 mg/dL      eGFR Non African Amer 101 mL/min/1.73      Globulin 3.0 gm/dL      A/G Ratio 1.5 g/dL      BUN/Creatinine Ratio 26.3     Anion Gap 13.0 mmol/L     Narrative:      GFR Normal >60  Chronic Kidney Disease <60  Kidney Failure <15      Protime-INR [[id number]]  (Normal) Collected: 01/08/21 0046    Specimen: Blood Updated: 01/08/21 0115     Protime 13.1 Seconds      INR 1.03    BNP [[id number]]  (Abnormal) Collected: 01/08/21 0046    Specimen: Blood Updated: 01/08/21 0115     proBNP 1,608.0 pg/mL     Narrative:      Among patients with dyspnea, NT-proBNP is highly sensitive for the detection of acute congestive heart failure. In addition NT-proBNP of <300 pg/ml effectively rules out acute congestive heart failure with 99% negative predictive value.    Results may be falsely decreased if patient taking Biotin.      D-dimer, Quantitative [[id number]]  (Abnormal) Collected: 01/08/21 0046    Specimen: Blood Updated: 01/08/21 0115     D-Dimer, Quantitative 1.14 mg/L (FEU)     Narrative:      Reference Range is 0-0.50 mg/L FEU. However, results <0.50 mg/L FEU tends to rule out DVT or PE. Results >0.50 mg/L FEU are not useful in predicting absence or presence of DVT or PE.      Blood Culture - Blood, Arm, Left [[id number]] Collected: 01/08/21 0046    Specimen: Blood from Arm, Left Updated: 01/08/21 0057    Blood Culture - Blood, Arm, Right [[id number]] Collected: 01/08/21 0046    Specimen: Blood from Arm, Right Updated: 01/08/21 0057    CBC & Differential [[id number]]  (Abnormal) Collected: 01/08/21 0046    Specimen: Blood Updated: 01/08/21 0057    Narrative:      The following orders were created for panel order CBC & Differential.  Procedure                               Abnormality         Status                     ---------                               -----------         ------                     CBC Auto Differential[[id number]]        Abnormal             Final result                 Please view results for these tests on the individual orders.    CBC Auto Differential [[id number]]  (Abnormal) Collected: 01/08/21 0046    Specimen: Blood Updated: 01/08/21 0057     WBC 18.43 10*3/mm3      RBC 5.36 10*6/mm3      Hemoglobin 15.5 g/dL      Hematocrit 45.3 %      MCV 84.5 fL      MCH 28.9 pg      MCHC 34.2 g/dL      RDW 12.7 %      RDW-SD 38.8 fl      MPV 10.9 fL      Platelets 307 10*3/mm3      Neutrophil % 77.1 %      Lymphocyte % 10.0 %      Monocyte % 11.8 %      Eosinophil % 0.1 %      Basophil % 0.4 %      Immature Grans % 0.6 %      Neutrophils, Absolute 14.22 10*3/mm3      Lymphocytes, Absolute 1.84 10*3/mm3      Monocytes, Absolute 2.18 10*3/mm3      Eosinophils, Absolute 0.01 10*3/mm3      Basophils, Absolute 0.07 10*3/mm3      Immature Grans, Absolute 0.11 10*3/mm3      nRBC 0.0 /100 WBC           Imaging Results (Last 24 Hours)     Procedure Component Value Units Date/Time    CT Angiogram Chest [[id number]] Collected: 01/08/21 0804     Updated: 01/08/21 0811    Narrative:      CT ANGIOGRAM CHEST- 1/8/2021 6:18 AM CST      HISTORY: dyspnea; I21.4-Non-ST elevation (NSTEMI) myocardial infarction;  I50.9-Heart failure, unspecified      COMPARISON: None.      Radiation dose equals  mGy-cm.  Automated exposure control dose  reduction technique was implemented.        TECHNIQUE: Helical tomographic images of the chest were obtained after  the administration of intravenous contrast following angiogram protocol.  Additionally, 3D reformatted images were provided.        FINDINGS:    Pulmonary arteries: There is adequate enhancement of the pulmonary  arteries to evaluate for central and segmental pulmonary emboli. There  are no filling defects within the main, lobar, segmental or visualized  subsegmental pulmonary arteries. The pulmonary arteries are within  normal limits.      Aorta and great vessels: The aorta is well opacified and demonstrates  no  dissection or aneurysm. The great vessels are normal in appearance.     Visualized neck base: The imaged portion of the base of the neck appears  unremarkable.      Lungs: Small bilateral pleural effusions. Additionally, there is central  groundglass opacities, with interlobular septal thickening, most  concerning for pulmonary edema.. The trachea and bronchial tree are  patent.      Heart: The heart is normal in size. There is no pericardial effusion.      Mediastinum and lymph nodes: No enlarged mediastinal, hilar, or axillary  lymph nodes are present.      Skeletal and soft tissues: The osseous structures of the thorax and  surrounding soft tissues demonstrate no acute process.     Upper abdomen: The imaged portion of the upper abdomen demonstrates no  acute process.        Impression:      1. No evidence of pulmonary embolus.  2. Pleural effusions, central groundglass opacities and interlobular  septal thickening, most concerning for fluid overload. Superimposed  infection is not excluded.        This report was finalized on 01/08/2021 08:08 by Dr. Suly Bella MD.    XR Chest 1 View [[id number]] Collected: 01/08/21 0749     Updated: 01/08/21 0753    Narrative:      EXAMINATION: XR CHEST 1 VW- 1/8/2021 7:49 AM CST     HISTORY: Dyspnea     COMPARISON: None     FINDINGS:  The heart and mediastinal contours appear normal. Bilateral interstitial  and alveolar opacities are present, most pronounced in the perihilar  regions. There is no appreciable pneumothorax or pleural effusion. The  pulmonary vasculature is indistinct.       Impression:      Bilateral airspace disease most concerning for pneumonia.  This report was finalized on 01/08/2021 07:50 by Dr. Octavio Gomez MD.             Intake/Output Summary (Last 24 hours) at 1/8/2021 1456  Last data filed at 1/8/2021 1400  Gross per 24 hour   Intake --   Output 1250 ml   Net -1250 ml       Physical Exam  Constitutional:       General: He is not in acute  distress.     Appearance: Normal appearance.      Interventions: Nasal cannula in place.   HENT:      Head: Normocephalic and atraumatic.      Right Ear: External ear normal.      Left Ear: External ear normal.      Nose: Nose normal.      Mouth/Throat:      Mouth: Mucous membranes are moist.      Pharynx: Oropharynx is clear.   Eyes:      General: No scleral icterus.     Conjunctiva/sclera: Conjunctivae normal.   Neck:      Musculoskeletal: Normal range of motion and neck supple.   Cardiovascular:      Rate and Rhythm: Tachycardia present.      Pulses: Normal pulses.      Heart sounds: Normal heart sounds.   Pulmonary:      Effort: Pulmonary effort is normal. No tachypnea, accessory muscle usage or respiratory distress.      Breath sounds: Examination of the right-lower field reveals rales. Examination of the left-lower field reveals rales. Rales present.   Abdominal:      General: Abdomen is flat.      Palpations: Abdomen is soft. There is no mass.      Tenderness: There is no abdominal tenderness.   Musculoskeletal: Normal range of motion.      Right lower leg: Edema (1/4) present.      Left lower leg: Edema (1/4) present.   Lymphadenopathy:      Cervical: No cervical adenopathy.   Skin:     General: Skin is warm and dry.      Coloration: Skin is not pale.   Neurological:      General: No focal deficit present.      Mental Status: He is alert and oriented to person, place, and time. Mental status is at baseline.   Psychiatric:         Mood and Affect: Mood normal.         Judgment: Judgment normal.       Results Review:  I have reviewed the labs, radiology results, and diagnostic studies since my last progress note and made treatment changes reflective of the results.   I have reviewed the current medications.    Assessment/Plan     Active Hospital Problems    Diagnosis   • **Acute systolic congestive heart failure (CMS/HCC)   • NSTEMI (non-ST elevated myocardial infarction) (CMS/HCC)   • Acute pulmonary edema  (CMS/[facility])   • Diabetes mellitus (CMS/[facility])   • Hypertension   • Hyperlipidemia       PLAN:  Increase Lasix to 40 mg IV every 12 hours  Sputum culture  Cardiology consultation  Continue sliding scale insulin    Electronically signed by Magdiel Schultz DO, 01/08/21, 14:56 CST.

## 2021-01-09 LAB
ALBUMIN SERPL-MCNC: 3.8 G/DL (ref 3.5–5.2)
ALBUMIN/GLOB SERPL: 1.4 G/DL
ALP SERPL-CCNC: 55 U/L (ref 39–117)
ALT SERPL W P-5'-P-CCNC: 15 U/L (ref 1–41)
ANION GAP SERPL CALCULATED.3IONS-SCNC: 11 MMOL/L (ref 5–15)
AST SERPL-CCNC: 39 U/L (ref 1–40)
BASOPHILS # BLD AUTO: 0.03 10*3/MM3 (ref 0–0.2)
BASOPHILS NFR BLD AUTO: 0.3 % (ref 0–1.5)
BILIRUB SERPL-MCNC: 0.6 MG/DL (ref 0–1.2)
BUN SERPL-MCNC: 18 MG/DL (ref 8–23)
BUN/CREAT SERPL: 21.4 (ref 7–25)
CALCIUM SPEC-SCNC: 9.3 MG/DL (ref 8.6–10.5)
CHLORIDE SERPL-SCNC: 98 MMOL/L (ref 98–107)
CHOLEST SERPL-MCNC: 151 MG/DL (ref 0–200)
CO2 SERPL-SCNC: 27 MMOL/L (ref 22–29)
CREAT SERPL-MCNC: 0.84 MG/DL (ref 0.76–1.27)
DEPRECATED RDW RBC AUTO: 39.2 FL (ref 37–54)
EOSINOPHIL # BLD AUTO: 0.02 10*3/MM3 (ref 0–0.4)
EOSINOPHIL NFR BLD AUTO: 0.2 % (ref 0.3–6.2)
ERYTHROCYTE [DISTWIDTH] IN BLOOD BY AUTOMATED COUNT: 12.8 % (ref 12.3–15.4)
GFR SERPL CREATININE-BSD FRML MDRD: 90 ML/MIN/1.73
GLOBULIN UR ELPH-MCNC: 2.7 GM/DL
GLUCOSE BLDC GLUCOMTR-MCNC: 143 MG/DL (ref 70–130)
GLUCOSE BLDC GLUCOMTR-MCNC: 159 MG/DL (ref 70–130)
GLUCOSE BLDC GLUCOMTR-MCNC: 190 MG/DL (ref 70–130)
GLUCOSE BLDC GLUCOMTR-MCNC: 230 MG/DL (ref 70–130)
GLUCOSE SERPL-MCNC: 161 MG/DL (ref 65–99)
HBA1C MFR BLD: 7.1 % (ref 4.8–5.6)
HCT VFR BLD AUTO: 37.3 % (ref 37.5–51)
HDLC SERPL-MCNC: 38 MG/DL (ref 40–60)
HGB BLD-MCNC: 13 G/DL (ref 13–17.7)
IMM GRANULOCYTES # BLD AUTO: 0.05 10*3/MM3 (ref 0–0.05)
IMM GRANULOCYTES NFR BLD AUTO: 0.4 % (ref 0–0.5)
LDLC SERPL CALC-MCNC: 82 MG/DL (ref 0–100)
LDLC/HDLC SERPL: 2.03 {RATIO}
LYMPHOCYTES # BLD AUTO: 2.09 10*3/MM3 (ref 0.7–3.1)
LYMPHOCYTES NFR BLD AUTO: 18.6 % (ref 19.6–45.3)
MAGNESIUM SERPL-MCNC: 1.7 MG/DL (ref 1.6–2.4)
MCH RBC QN AUTO: 29.7 PG (ref 26.6–33)
MCHC RBC AUTO-ENTMCNC: 34.9 G/DL (ref 31.5–35.7)
MCV RBC AUTO: 85.2 FL (ref 79–97)
MONOCYTES # BLD AUTO: 1.89 10*3/MM3 (ref 0.1–0.9)
MONOCYTES NFR BLD AUTO: 16.8 % (ref 5–12)
NEUTROPHILS NFR BLD AUTO: 63.7 % (ref 42.7–76)
NEUTROPHILS NFR BLD AUTO: 7.14 10*3/MM3 (ref 1.7–7)
NRBC BLD AUTO-RTO: 0 /100 WBC (ref 0–0.2)
PHOSPHATE SERPL-MCNC: 3.1 MG/DL (ref 2.5–4.5)
PLATELET # BLD AUTO: 208 10*3/MM3 (ref 140–450)
PMV BLD AUTO: 11.4 FL (ref 6–12)
POTASSIUM SERPL-SCNC: 3.7 MMOL/L (ref 3.5–5.2)
PROT SERPL-MCNC: 6.5 G/DL (ref 6–8.5)
RBC # BLD AUTO: 4.38 10*6/MM3 (ref 4.14–5.8)
SODIUM SERPL-SCNC: 136 MMOL/L (ref 136–145)
TRIGL SERPL-MCNC: 179 MG/DL (ref 0–150)
TROPONIN T SERPL-MCNC: 0.65 NG/ML (ref 0–0.03)
TROPONIN T SERPL-MCNC: 0.72 NG/ML (ref 0–0.03)
VLDLC SERPL-MCNC: 31 MG/DL (ref 5–40)
WBC # BLD AUTO: 11.22 10*3/MM3 (ref 3.4–10.8)

## 2021-01-09 PROCEDURE — 25010000002 ENOXAPARIN PER 10 MG: Performed by: INTERNAL MEDICINE

## 2021-01-09 PROCEDURE — 82962 GLUCOSE BLOOD TEST: CPT

## 2021-01-09 PROCEDURE — 80053 COMPREHEN METABOLIC PANEL: CPT | Performed by: INTERNAL MEDICINE

## 2021-01-09 PROCEDURE — 93005 ELECTROCARDIOGRAM TRACING: CPT | Performed by: FAMILY MEDICINE

## 2021-01-09 PROCEDURE — 84100 ASSAY OF PHOSPHORUS: CPT | Performed by: INTERNAL MEDICINE

## 2021-01-09 PROCEDURE — 25010000002 FUROSEMIDE PER 20 MG: Performed by: FAMILY MEDICINE

## 2021-01-09 PROCEDURE — 83036 HEMOGLOBIN GLYCOSYLATED A1C: CPT | Performed by: INTERNAL MEDICINE

## 2021-01-09 PROCEDURE — 83735 ASSAY OF MAGNESIUM: CPT | Performed by: INTERNAL MEDICINE

## 2021-01-09 PROCEDURE — 84484 ASSAY OF TROPONIN QUANT: CPT | Performed by: NURSE PRACTITIONER

## 2021-01-09 PROCEDURE — 85025 COMPLETE CBC W/AUTO DIFF WBC: CPT | Performed by: INTERNAL MEDICINE

## 2021-01-09 PROCEDURE — 63710000001 INSULIN LISPRO (HUMAN) PER 5 UNITS: Performed by: INTERNAL MEDICINE

## 2021-01-09 PROCEDURE — 94799 UNLISTED PULMONARY SVC/PX: CPT

## 2021-01-09 PROCEDURE — 93010 ELECTROCARDIOGRAM REPORT: CPT | Performed by: INTERNAL MEDICINE

## 2021-01-09 PROCEDURE — 99232 SBSQ HOSP IP/OBS MODERATE 35: CPT | Performed by: INTERNAL MEDICINE

## 2021-01-09 PROCEDURE — 80061 LIPID PANEL: CPT | Performed by: INTERNAL MEDICINE

## 2021-01-09 RX ORDER — FUROSEMIDE 40 MG/1
40 TABLET ORAL DAILY
Status: DISCONTINUED | OUTPATIENT
Start: 2021-01-10 | End: 2021-01-18 | Stop reason: HOSPADM

## 2021-01-09 RX ADMIN — CARVEDILOL 3.12 MG: 3.12 TABLET, FILM COATED ORAL at 09:37

## 2021-01-09 RX ADMIN — INSULIN LISPRO 2 UNITS: 100 INJECTION, SOLUTION INTRAVENOUS; SUBCUTANEOUS at 18:10

## 2021-01-09 RX ADMIN — GABAPENTIN 600 MG: 300 CAPSULE ORAL at 21:12

## 2021-01-09 RX ADMIN — AMITRIPTYLINE HYDROCHLORIDE 100 MG: 25 TABLET, FILM COATED ORAL at 21:13

## 2021-01-09 RX ADMIN — GABAPENTIN 600 MG: 300 CAPSULE ORAL at 15:16

## 2021-01-09 RX ADMIN — INSULIN LISPRO 2 UNITS: 100 INJECTION, SOLUTION INTRAVENOUS; SUBCUTANEOUS at 12:42

## 2021-01-09 RX ADMIN — LISINOPRIL 5 MG: 5 TABLET ORAL at 09:37

## 2021-01-09 RX ADMIN — CARVEDILOL 3.12 MG: 3.12 TABLET, FILM COATED ORAL at 18:10

## 2021-01-09 RX ADMIN — ATORVASTATIN CALCIUM 40 MG: 40 TABLET, FILM COATED ORAL at 09:37

## 2021-01-09 RX ADMIN — GABAPENTIN 600 MG: 300 CAPSULE ORAL at 05:22

## 2021-01-09 RX ADMIN — INDAPAMIDE 1.25 MG: 1.25 TABLET, FILM COATED ORAL at 05:22

## 2021-01-09 RX ADMIN — ASPIRIN 81 MG: 81 TABLET, CHEWABLE ORAL at 09:37

## 2021-01-09 RX ADMIN — TAMSULOSIN HYDROCHLORIDE 0.4 MG: 0.4 CAPSULE ORAL at 09:37

## 2021-01-09 RX ADMIN — ENOXAPARIN SODIUM 90 MG: 100 INJECTION SUBCUTANEOUS at 18:10

## 2021-01-09 RX ADMIN — FUROSEMIDE 40 MG: 10 INJECTION, SOLUTION INTRAMUSCULAR; INTRAVENOUS at 09:37

## 2021-01-09 RX ADMIN — CLOPIDOGREL 75 MG: 75 TABLET, FILM COATED ORAL at 09:37

## 2021-01-09 RX ADMIN — SODIUM CHLORIDE, PRESERVATIVE FREE 10 ML: 5 INJECTION INTRAVENOUS at 21:13

## 2021-01-09 RX ADMIN — SODIUM CHLORIDE, PRESERVATIVE FREE 10 ML: 5 INJECTION INTRAVENOUS at 09:37

## 2021-01-09 RX ADMIN — FAMOTIDINE 20 MG: 20 TABLET, FILM COATED ORAL at 09:37

## 2021-01-09 RX ADMIN — ENOXAPARIN SODIUM 90 MG: 100 INJECTION SUBCUTANEOUS at 05:22

## 2021-01-09 NOTE — PLAN OF CARE
Goal Outcome Evaluation:  Plan of Care Reviewed With: patient  Progress: improving  Outcome Summary: VSS, no c/o of pain; 5-6 L of oxygen with sats in mid 90s on continuous pulse ox; slept well during shift; up ad ok to bathroom; reports less SOA with exertion; sinus arrythmia 87 w/ PVC on tele; position changed independently; safety maintained

## 2021-01-09 NOTE — PROGRESS NOTES
"    RE:  Wesley Eduardo  :  1945    CC: dyspnea         Subjective: Patient feels like his breathing continues to improve.  He denies any chest pain.  He reports he is up moving around his room without difficulty this morning.  He still remains on increased oxygen requirements.    ROS: Pertinent positives and negatives listed above.  All other systems reviewed and negative.    Objective:   /65 (BP Location: Left arm, Patient Position: Lying)   Pulse 93   Temp 98.1 °F (36.7 °C) (Oral)   Resp 16   Ht 175.3 cm (69\")   Wt 90.2 kg (198 lb 12.8 oz)   SpO2 94%   BMI 29.36 kg/m²   Temp:  [97.2 °F (36.2 °C)-98.7 °F (37.1 °C)] 98.1 °F (36.7 °C)  Heart Rate:  [] 93  Resp:  [16-20] 16  BP: ()/(47-65) 133/65    Intake/Output Summary (Last 24 hours) at 2021 0826  Last data filed at 2021 1920  Gross per 24 hour   Intake 480 ml   Output 1550 ml   Net -1070 ml       Current Facility-Administered Medications:   •  acetaminophen (TYLENOL) tablet 650 mg, 650 mg, Oral, Q4H PRN, Manuel Jones DO  •  amitriptyline (ELAVIL) tablet 100 mg, 100 mg, Oral, Nightly, Manuel Jones DO, 100 mg at 21 210  •  aspirin chewable tablet 81 mg, 81 mg, Oral, Daily, Cade Hong MD, 81 mg at 21 174  •  atorvastatin (LIPITOR) tablet 40 mg, 40 mg, Oral, Daily, Cade Hong MD  •  carvedilol (COREG) tablet 3.125 mg, 3.125 mg, Oral, BID With Meals, Cade Hong MD, 3.125 mg at 21 174  •  clopidogrel (PLAVIX) tablet 75 mg, 75 mg, Oral, Daily, Manuel Jones DO, 75 mg at 21 0838  •  dextrose (D50W) 25 g/ 50mL Intravenous Solution 25 g, 25 g, Intravenous, Q15 Min PRN, Manuel Jones DO  •  dextrose (GLUTOSE) oral gel 15 g, 15 g, Oral, Q15 Min PRN, Manuel Jones DO  •  enoxaparin (LOVENOX) syringe 90 mg, 1 mg/kg, Subcutaneous, Q12H, Cade Hong MD, 90 mg at 21 0522  •  famotidine (PEPCID) tablet 20 mg, 20 mg, Oral, Daily, Manuel Jones DO, 20 " mg at 01/08/21 0838  •  furosemide (LASIX) injection 40 mg, 40 mg, Intravenous, BID, Magdiel Schultz DO, 40 mg at 01/08/21 1747  •  gabapentin (NEURONTIN) capsule 600 mg, 600 mg, Oral, Q8H, Manuel Jones DO, 600 mg at 01/09/21 0522  •  glucagon (human recombinant) (GLUCAGEN DIAGNOSTIC) injection 1 mg, 1 mg, Subcutaneous, Q15 Min PRN, Maunel Jones DO  •  indapamide (LOZOL) tablet 1.25 mg, 1.25 mg, Oral, Q AM, Manuel Jones DO, 1.25 mg at 01/09/21 0522  •  insulin lispro (humaLOG, ADMELOG) injection 2-7 Units, 2-7 Units, Subcutaneous, TID AC, Manuel Jones DO, 2 Units at 01/08/21 1745  •  lisinopril (PRINIVIL,ZESTRIL) tablet 5 mg, 5 mg, Oral, Q24H, Manuel Jones DO, 5 mg at 01/08/21 0838  •  ondansetron (ZOFRAN) injection 4 mg, 4 mg, Intravenous, Q6H PRN, Manuel Jones DO  •  [COMPLETED] Insert peripheral IV, , , Once **AND** sodium chloride 0.9 % flush 10 mL, 10 mL, Intravenous, PRN, Andreas Vizcarra MD  •  sodium chloride 0.9 % flush 10 mL, 10 mL, Intravenous, Q12H, Manuel Jones DO, 10 mL at 01/08/21 2109  •  tamsulosin (FLOMAX) 24 hr capsule 0.4 mg, 0.4 mg, Oral, Daily, Manuel Jones DO, 0.4 mg at 01/08/21 0838  •  traMADol-acetaminophen (ULTRACET) 37.5-325 MG per tablet 1 tablet, 1 tablet, Oral, BID PRN, Magdiel Schultz DO, 1 tablet at 01/08/21 1504    Physical Exam:   Gen: Alert and oriented x3, no acute distress  Heart: Regular rate and rhythm, no murmurs, rubs, or gallops  Chest: Bibasilar crackles with otherwise coarse breath sounds throughout, normal respiratory effort  Abd: Soft, non tender, bowel sounds are positive  Ext: No clubbing, cyanosis, or edema     Lab Results (last 24 hours)     Procedure Component Value Units Date/Time    POC Glucose Once [[id number]]  (Abnormal) Collected: 01/09/21 0737    Specimen: Blood Updated: 01/09/21 0748     Glucose 143 mg/dL      Comment: : [id number] Ron Tinajero) KaylieMeter ID: [id number]        Troponin [[id number]]  (Abnormal) Collected: 01/09/21 0447    Specimen: Blood Updated: 01/09/21 0536     Troponin T 0.646 ng/mL     Narrative:      Troponin T Reference Range:  <= 0.03 ng/mL-   Negative for AMI  >0.03 ng/mL-     Abnormal for myocardial necrosis.  Clinicians would have to utilize clinical acumen, EKG, Troponin and serial changes to determine if it is an Acute Myocardial Infarction or myocardial injury due to an underlying chronic condition.       Results may be falsely decreased if patient taking Biotin.      Comprehensive Metabolic Panel [[id number]]  (Abnormal) Collected: 01/09/21 0208    Specimen: Blood Updated: 01/09/21 0422     Glucose 161 mg/dL      BUN 18 mg/dL      Creatinine 0.84 mg/dL      Sodium 136 mmol/L      Potassium 3.7 mmol/L      Chloride 98 mmol/L      CO2 27.0 mmol/L      Calcium 9.3 mg/dL      Total Protein 6.5 g/dL      Albumin 3.80 g/dL      ALT (SGPT) 15 U/L      AST (SGOT) 39 U/L      Alkaline Phosphatase 55 U/L      Total Bilirubin 0.6 mg/dL      eGFR Non African Amer 90 mL/min/1.73      Globulin 2.7 gm/dL      A/G Ratio 1.4 g/dL      BUN/Creatinine Ratio 21.4     Anion Gap 11.0 mmol/L     Narrative:      GFR Normal >60  Chronic Kidney Disease <60  Kidney Failure <15      Troponin [[id number]]  (Abnormal) Collected: 01/09/21 0208    Specimen: Blood Updated: 01/09/21 0417     Troponin T 0.717 ng/mL     Narrative:      Troponin T Reference Range:  <= 0.03 ng/mL-   Negative for AMI  >0.03 ng/mL-     Abnormal for myocardial necrosis.  Clinicians would have to utilize clinical acumen, EKG, Troponin and serial changes to determine if it is an Acute Myocardial Infarction or myocardial injury due to an underlying chronic condition.       Results may be falsely decreased if patient taking Biotin.      Hemoglobin A1c [[id number]]  (Abnormal) Collected: 01/09/21 0208    Specimen: Blood Updated: 01/09/21 0410     Hemoglobin A1C 7.10 %     Narrative:      Hemoglobin A1C Ranges:    Increased  Risk for Diabetes  5.7% to 6.4%  Diabetes                     >= 6.5%  Diabetic Goal                < 7.0%    Lipid Panel [[id number]]  (Abnormal) Collected: 01/09/21 0208    Specimen: Blood Updated: 01/09/21 0409     Total Cholesterol 151 mg/dL      Triglycerides 179 mg/dL      HDL Cholesterol 38 mg/dL      LDL Cholesterol  82 mg/dL      VLDL Cholesterol 31 mg/dL      LDL/HDL Ratio 2.03    Narrative:      Cholesterol Reference Ranges  (U.[place]. Department of Health and Human Services ATP III Classifications)    Desirable          <200 mg/dL  Borderline High    200-239 mg/dL  High Risk          >240 mg/dL      Triglyceride Reference Ranges  (U.S. Department of Health and Human Services ATP III Classifications)    Normal           <150 mg/dL  Borderline High  150-199 mg/dL  High             200-499 mg/dL  Very High        >500 mg/dL    HDL Reference Ranges  (U.S. Department of Health and Human Services ATP III Classifcations)    Low     <40 mg/dl (major risk factor for CHD)  High    >60 mg/dl ('negative' risk factor for CHD)        LDL Reference Ranges  (U.S. Department of Health and Human Services ATP III Classifcations)    Optimal          <100 mg/dL  Near Optimal     100-129 mg/dL  Borderline High  130-159 mg/dL  High             160-189 mg/dL  Very High        >189 mg/dL    Magnesium [[id number]]  (Normal) Collected: 01/09/21 0208    Specimen: Blood Updated: 01/09/21 0409     Magnesium 1.7 mg/dL     Phosphorus [[id number]]  (Normal) Collected: 01/09/21 0208    Specimen: Blood Updated: 01/09/21 0409     Phosphorus 3.1 mg/dL     CBC Auto Differential [[id number]]  (Abnormal) Collected: 01/09/21 0208    Specimen: Blood Updated: 01/09/21 0346     WBC 11.22 10*3/mm3      RBC 4.38 10*6/mm3      Hemoglobin 13.0 g/dL      Hematocrit 37.3 %      MCV 85.2 fL      MCH 29.7 pg      MCHC 34.9 g/dL      RDW 12.8 %      RDW-SD 39.2 fl      MPV 11.4 fL      Platelets 208 10*3/mm3      Neutrophil % 63.7 %      Lymphocyte % 18.6 %       Monocyte % 16.8 %      Eosinophil % 0.2 %      Basophil % 0.3 %      Immature Grans % 0.4 %      Neutrophils, Absolute 7.14 10*3/mm3      Lymphocytes, Absolute 2.09 10*3/mm3      Monocytes, Absolute 1.89 10*3/mm3      Eosinophils, Absolute 0.02 10*3/mm3      Basophils, Absolute 0.03 10*3/mm3      Immature Grans, Absolute 0.05 10*3/mm3      nRBC 0.0 /100 WBC     Blood Culture - Blood, Arm, Left [[id number]] Collected: 01/08/21 0046    Specimen: Blood from Arm, Left Updated: 01/09/21 0100     Blood Culture No growth at 24 hours    Blood Culture - Blood, Arm, Right [[id number]] Collected: 01/08/21 0046    Specimen: Blood from Arm, Right Updated: 01/09/21 0100     Blood Culture No growth at 24 hours    Troponin [[id number]]  (Abnormal) Collected: 01/08/21 2229    Specimen: Blood Updated: 01/08/21 2305     Troponin T 0.617 ng/mL     Narrative:      Troponin T Reference Range:  <= 0.03 ng/mL-   Negative for AMI  >0.03 ng/mL-     Abnormal for myocardial necrosis.  Clinicians would have to utilize clinical acumen, EKG, Troponin and serial changes to determine if it is an Acute Myocardial Infarction or myocardial injury due to an underlying chronic condition.       Results may be falsely decreased if patient taking Biotin.      Troponin [[id number]]  (Abnormal) Collected: 01/08/21 1934    Specimen: Blood Updated: 01/08/21 2017     Troponin T 0.539 ng/mL     Narrative:      Troponin T Reference Range:  <= 0.03 ng/mL-   Negative for AMI  >0.03 ng/mL-     Abnormal for myocardial necrosis.  Clinicians would have to utilize clinical acumen, EKG, Troponin and serial changes to determine if it is an Acute Myocardial Infarction or myocardial injury due to an underlying chronic condition.       Results may be falsely decreased if patient taking Biotin.      POC Glucose Once [[id number]]  (Abnormal) Collected: 01/08/21 2001    Specimen: Blood Updated: 01/08/21 2013     Glucose 144 mg/dL      Comment: : [id number] Page AlexisMeter ID:  [id number]       Troponin [[id number]]  (Abnormal) Collected: 01/08/21 1702    Specimen: Blood Updated: 01/08/21 1745     Troponin T 0.453 ng/mL     Narrative:      Troponin T Reference Range:  <= 0.03 ng/mL-   Negative for AMI  >0.03 ng/mL-     Abnormal for myocardial necrosis.  Clinicians would have to utilize clinical acumen, EKG, Troponin and serial changes to determine if it is an Acute Myocardial Infarction or myocardial injury due to an underlying chronic condition.       Results may be falsely decreased if patient taking Biotin.      POC Glucose Once [[id number]]  (Abnormal) Collected: 01/08/21 1647    Specimen: Blood Updated: 01/08/21 1708     Glucose 180 mg/dL      Comment: : [id number] Anson LisaMeter ID: [id number]       Respiratory Culture - Sputum, Cough [[id number]] Collected: 01/08/21 1544    Specimen: Sputum from Cough Updated: 01/08/21 1544    Troponin [[id number]]  (Abnormal) Collected: 01/08/21 1402    Specimen: Blood Updated: 01/08/21 1442     Troponin T 0.453 ng/mL     Narrative:      Troponin T Reference Range:  <= 0.03 ng/mL-   Negative for AMI  >0.03 ng/mL-     Abnormal for myocardial necrosis.  Clinicians would have to utilize clinical acumen, EKG, Troponin and serial changes to determine if it is an Acute Myocardial Infarction or myocardial injury due to an underlying chronic condition.       Results may be falsely decreased if patient taking Biotin.      POC Glucose Once [[id number]]  (Abnormal) Collected: 01/08/21 1140    Specimen: Blood Updated: 01/08/21 1224     Glucose 227 mg/dL      Comment: : [id number] Anson LisaMeter ID: [id number]       Troponin [[id number]]  (Abnormal) Collected: 01/08/21 1036    Specimen: Blood Updated: 01/08/21 1120     Troponin T 0.303 ng/mL     Narrative:      Troponin T Reference Range:  <= 0.03 ng/mL-   Negative for AMI  >0.03 ng/mL-     Abnormal for myocardial necrosis.  Clinicians would have to utilize clinical acumen, EKG, Troponin and serial changes  to determine if it is an Acute Myocardial Infarction or myocardial injury due to an underlying chronic condition.       Results may be falsely decreased if patient taking Biotin.      Troponin [[id number]]  (Abnormal) Collected: 01/08/21 0800    Specimen: Blood Updated: 01/08/21 0856     Troponin T 0.292 ng/mL     Narrative:      Troponin T Reference Range:  <= 0.03 ng/mL-   Negative for AMI  >0.03 ng/mL-     Abnormal for myocardial necrosis.  Clinicians would have to utilize clinical acumen, EKG, Troponin and serial changes to determine if it is an Acute Myocardial Infarction or myocardial injury due to an underlying chronic condition.       Results may be falsely decreased if patient taking Biotin.      POC Glucose Once [[id number]]  (Abnormal) Collected: 01/08/21 0759    Specimen: Blood Updated: 01/08/21 0834     Glucose 191 mg/dL      Comment: : [id number] Anson LisaMeter ID: [id number]           Imaging Results (Last 24 Hours)     ** No results found for the last 24 hours. **        -CTA chest (1/8/2021) no evidence of pulmonary emboli, pleural effusions, central groundglass opacities, interlobular septal thickening most concerning for fluid overload  -Echo (1/8/2020) EF 36-40%, multiple regional wall motion abnormalities, grade 1 diastolic dysfunction, normal RV size and function, no significant valve dysfunction  -Hemoglobin A1c (1/9/2021) 7.1%  -Lipid panel (1/9/2021) total cholesterol 151, HDL 38, LDL 82, triglycerides 179    Assessment:   1.  Acute systolic congestive heart failure: Elevated proBNP 1608 with evidence of volume overload on CT chest.  Regional wall motion abnormalities on echocardiogram concerning for ischemic cardiomyopathy.  2.  Non-STEMI: Troponin peaked at 0.717.  He remains chest pain-free. 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  3.  Diabetes mellitus type 2: Acceptable control  4.  Peripheral vascular disease: History of carotid stenosis  5.  Essential  hypertension  6.  Mixed hyperlipidemia      Plan:   -Continue IV diuresis with close monitoring of renal function and electrolytes.  -Plan for cardiac catheterization within the next 48 hours when oxygen requirements improved.  -Continue aspirin, Plavix, enoxaparin, carvedilol, lisinopril, and atorvastatin.  -Continue to optimize heart failure medications as tolerated.

## 2021-01-10 LAB
ANION GAP SERPL CALCULATED.3IONS-SCNC: 10 MMOL/L (ref 5–15)
BACTERIA SPEC RESP CULT: NORMAL
BUN SERPL-MCNC: 20 MG/DL (ref 8–23)
BUN/CREAT SERPL: 25.6 (ref 7–25)
CALCIUM SPEC-SCNC: 9.3 MG/DL (ref 8.6–10.5)
CHLORIDE SERPL-SCNC: 99 MMOL/L (ref 98–107)
CO2 SERPL-SCNC: 27 MMOL/L (ref 22–29)
CREAT SERPL-MCNC: 0.78 MG/DL (ref 0.76–1.27)
GFR SERPL CREATININE-BSD FRML MDRD: 98 ML/MIN/1.73
GLUCOSE BLDC GLUCOMTR-MCNC: 121 MG/DL (ref 70–130)
GLUCOSE BLDC GLUCOMTR-MCNC: 174 MG/DL (ref 70–130)
GLUCOSE BLDC GLUCOMTR-MCNC: 175 MG/DL (ref 70–130)
GLUCOSE BLDC GLUCOMTR-MCNC: 181 MG/DL (ref 70–130)
GLUCOSE SERPL-MCNC: 148 MG/DL (ref 65–99)
GRAM STN SPEC: NORMAL
POTASSIUM SERPL-SCNC: 3.4 MMOL/L (ref 3.5–5.2)
QT INTERVAL: 380 MS
QTC INTERVAL: 443 MS
SODIUM SERPL-SCNC: 136 MMOL/L (ref 136–145)

## 2021-01-10 PROCEDURE — 80048 BASIC METABOLIC PNL TOTAL CA: CPT | Performed by: FAMILY MEDICINE

## 2021-01-10 PROCEDURE — 25010000002 ENOXAPARIN PER 10 MG: Performed by: INTERNAL MEDICINE

## 2021-01-10 PROCEDURE — 99232 SBSQ HOSP IP/OBS MODERATE 35: CPT | Performed by: INTERNAL MEDICINE

## 2021-01-10 PROCEDURE — 63710000001 INSULIN LISPRO (HUMAN) PER 5 UNITS: Performed by: INTERNAL MEDICINE

## 2021-01-10 PROCEDURE — 82962 GLUCOSE BLOOD TEST: CPT

## 2021-01-10 RX ORDER — ONDANSETRON 2 MG/ML
4 INJECTION INTRAMUSCULAR; INTRAVENOUS EVERY 6 HOURS PRN
Status: DISCONTINUED | OUTPATIENT
Start: 2021-01-10 | End: 2021-01-10 | Stop reason: SDUPTHER

## 2021-01-10 RX ORDER — POTASSIUM CHLORIDE 750 MG/1
20 CAPSULE, EXTENDED RELEASE ORAL DAILY
Status: DISCONTINUED | OUTPATIENT
Start: 2021-01-10 | End: 2021-01-18 | Stop reason: HOSPADM

## 2021-01-10 RX ORDER — DIPHENHYDRAMINE HCL 50 MG
50 CAPSULE ORAL
Status: DISCONTINUED | OUTPATIENT
Start: 2021-01-11 | End: 2021-01-11 | Stop reason: HOSPADM

## 2021-01-10 RX ORDER — CARVEDILOL 6.25 MG/1
6.25 TABLET ORAL 2 TIMES DAILY WITH MEALS
Status: DISCONTINUED | OUTPATIENT
Start: 2021-01-10 | End: 2021-01-18 | Stop reason: HOSPADM

## 2021-01-10 RX ORDER — POTASSIUM CHLORIDE 750 MG/1
40 CAPSULE, EXTENDED RELEASE ORAL ONCE
Status: COMPLETED | OUTPATIENT
Start: 2021-01-10 | End: 2021-01-10

## 2021-01-10 RX ADMIN — SODIUM CHLORIDE, PRESERVATIVE FREE 10 ML: 5 INJECTION INTRAVENOUS at 20:46

## 2021-01-10 RX ADMIN — ENOXAPARIN SODIUM 90 MG: 100 INJECTION SUBCUTANEOUS at 17:03

## 2021-01-10 RX ADMIN — FAMOTIDINE 20 MG: 20 TABLET, FILM COATED ORAL at 09:28

## 2021-01-10 RX ADMIN — FUROSEMIDE 40 MG: 40 TABLET ORAL at 09:27

## 2021-01-10 RX ADMIN — GABAPENTIN 600 MG: 300 CAPSULE ORAL at 13:57

## 2021-01-10 RX ADMIN — AMITRIPTYLINE HYDROCHLORIDE 100 MG: 25 TABLET, FILM COATED ORAL at 20:46

## 2021-01-10 RX ADMIN — GABAPENTIN 600 MG: 300 CAPSULE ORAL at 20:47

## 2021-01-10 RX ADMIN — LISINOPRIL 5 MG: 5 TABLET ORAL at 09:28

## 2021-01-10 RX ADMIN — SODIUM CHLORIDE, PRESERVATIVE FREE 10 ML: 5 INJECTION INTRAVENOUS at 09:28

## 2021-01-10 RX ADMIN — POTASSIUM CHLORIDE 20 MEQ: 750 CAPSULE, EXTENDED RELEASE ORAL at 17:03

## 2021-01-10 RX ADMIN — INSULIN LISPRO 2 UNITS: 100 INJECTION, SOLUTION INTRAVENOUS; SUBCUTANEOUS at 11:36

## 2021-01-10 RX ADMIN — INSULIN LISPRO 2 UNITS: 100 INJECTION, SOLUTION INTRAVENOUS; SUBCUTANEOUS at 09:28

## 2021-01-10 RX ADMIN — GABAPENTIN 600 MG: 300 CAPSULE ORAL at 05:59

## 2021-01-10 RX ADMIN — ENOXAPARIN SODIUM 90 MG: 100 INJECTION SUBCUTANEOUS at 05:59

## 2021-01-10 RX ADMIN — CARVEDILOL 6.25 MG: 6.25 TABLET, FILM COATED ORAL at 17:02

## 2021-01-10 RX ADMIN — ATORVASTATIN CALCIUM 40 MG: 40 TABLET, FILM COATED ORAL at 09:27

## 2021-01-10 RX ADMIN — TAMSULOSIN HYDROCHLORIDE 0.4 MG: 0.4 CAPSULE ORAL at 09:28

## 2021-01-10 RX ADMIN — POTASSIUM CHLORIDE 40 MEQ: 750 CAPSULE, EXTENDED RELEASE ORAL at 13:57

## 2021-01-10 RX ADMIN — CARVEDILOL 3.12 MG: 3.12 TABLET, FILM COATED ORAL at 09:28

## 2021-01-10 RX ADMIN — ASPIRIN 81 MG: 81 TABLET, CHEWABLE ORAL at 09:27

## 2021-01-10 RX ADMIN — INDAPAMIDE 1.25 MG: 1.25 TABLET, FILM COATED ORAL at 05:59

## 2021-01-10 RX ADMIN — CLOPIDOGREL 75 MG: 75 TABLET, FILM COATED ORAL at 09:28

## 2021-01-10 NOTE — PROGRESS NOTES
[facility] Medicine Services  INPATIENT PROGRESS NOTE    Length of Stay: 1  Date of Admission: 1/7/2021  Primary Care Physician: Alfredito Land MD    Subjective     Chief Complaint:     Shortness of breath    HPI     The patient is feeling much better today.  He has been weaned off oxygen.  He is able to lie flat without dyspnea.  Cardiology plans cardiac catheterization for further evaluation of heart failure with concern for ischemic cardiomyopathy given changes on recent echocardiogram.  He has been able to ambulate in his room without difficulty and without dyspnea.  IV Lasix will be converted to oral Lasix tomorrow a.m.  Recheck BMP in a.m. Troponin is downtrending at 0.646.  White blood cell count 11,200.  CMP is unremarkable.  Sputum culture shows scant growth of normal respiratory karen and no evidence for bacterial pneumonia.      Review of Systems     All pertinent negatives and positives are as above. All other systems have been reviewed and are negative unless otherwise stated.     Objective    Temp:  [97.7 °F (36.5 °C)-98.7 °F (37.1 °C)] 98.3 °F (36.8 °C)  Heart Rate:  [77-93] 79  Resp:  [14-18] 16  BP: ()/(47-65) 125/56    Lab Results (last 24 hours)     Procedure Component Value Units Date/Time    POC Glucose Once [[id number]]  (Abnormal) Collected: 01/09/21 1641    Specimen: Blood Updated: 01/09/21 1652     Glucose 159 mg/dL      Comment: : [id number] Ron Tinajero) KaylieMeter ID: [id number]       Respiratory Culture - Sputum, Cough [[id number]] Collected: 01/08/21 1544    Specimen: Sputum from Cough Updated: 01/09/21 1255     Respiratory Culture Scant growth (1+) Normal Respiratory Karen: NO S.aureus/MRSA or Pseudomonas aeruginosa     Gram Stain Greater than 25 Epithelial cells per low power field      Less than 25 WBCs per low power field      Rare (1+) Gram negative bacilli      Few (2+) Gram positive bacilli      Few (2+) Gram positive  cocci in pairs    POC Glucose Once [[id number]]  (Abnormal) Collected: 01/09/21 1108    Specimen: Blood Updated: 01/09/21 1119     Glucose 190 mg/dL      Comment: : [id number] Ron Tinajero) KaylieMeter ID: [id number]       POC Glucose Once [[id number]]  (Abnormal) Collected: 01/09/21 0737    Specimen: Blood Updated: 01/09/21 0748     Glucose 143 mg/dL      Comment: : [id number] Ron Tinajero) KaylieMeter ID: [id number]       Troponin [[id number]]  (Abnormal) Collected: 01/09/21 0447    Specimen: Blood Updated: 01/09/21 0536     Troponin T 0.646 ng/mL     Narrative:      Troponin T Reference Range:  <= 0.03 ng/mL-   Negative for AMI  >0.03 ng/mL-     Abnormal for myocardial necrosis.  Clinicians would have to utilize clinical acumen, EKG, Troponin and serial changes to determine if it is an Acute Myocardial Infarction or myocardial injury due to an underlying chronic condition.       Results may be falsely decreased if patient taking Biotin.      Comprehensive Metabolic Panel [[id number]]  (Abnormal) Collected: 01/09/21 0208    Specimen: Blood Updated: 01/09/21 0422     Glucose 161 mg/dL      BUN 18 mg/dL      Creatinine 0.84 mg/dL      Sodium 136 mmol/L      Potassium 3.7 mmol/L      Chloride 98 mmol/L      CO2 27.0 mmol/L      Calcium 9.3 mg/dL      Total Protein 6.5 g/dL      Albumin 3.80 g/dL      ALT (SGPT) 15 U/L      AST (SGOT) 39 U/L      Alkaline Phosphatase 55 U/L      Total Bilirubin 0.6 mg/dL      eGFR Non African Amer 90 mL/min/1.73      Globulin 2.7 gm/dL      A/G Ratio 1.4 g/dL      BUN/Creatinine Ratio 21.4     Anion Gap 11.0 mmol/L     Narrative:      GFR Normal >60  Chronic Kidney Disease <60  Kidney Failure <15      Troponin [[id number]]  (Abnormal) Collected: 01/09/21 0208    Specimen: Blood Updated: 01/09/21 0417     Troponin T 0.717 ng/mL     Narrative:      Troponin T Reference Range:  <= 0.03 ng/mL-   Negative for AMI  >0.03 ng/mL-     Abnormal for myocardial necrosis.  Clinicians  would have to utilize clinical acumen, EKG, Troponin and serial changes to determine if it is an Acute Myocardial Infarction or myocardial injury due to an underlying chronic condition.       Results may be falsely decreased if patient taking Biotin.      Hemoglobin A1c [[id number]]  (Abnormal) Collected: 01/09/21 0208    Specimen: Blood Updated: 01/09/21 0410     Hemoglobin A1C 7.10 %     Narrative:      Hemoglobin A1C Ranges:    Increased Risk for Diabetes  5.7% to 6.4%  Diabetes                     >= 6.5%  Diabetic Goal                < 7.0%    Lipid Panel [[id number]]  (Abnormal) Collected: 01/09/21 0208    Specimen: Blood Updated: 01/09/21 0409     Total Cholesterol 151 mg/dL      Triglycerides 179 mg/dL      HDL Cholesterol 38 mg/dL      LDL Cholesterol  82 mg/dL      VLDL Cholesterol 31 mg/dL      LDL/HDL Ratio 2.03    Narrative:      Cholesterol Reference Ranges  (U.S. Department of Health and Human Services ATP III Classifications)    Desirable          <200 mg/dL  Borderline High    200-239 mg/dL  High Risk          >240 mg/dL      Triglyceride Reference Ranges  (U.S. Department of Health and Human Services ATP III Classifications)    Normal           <150 mg/dL  Borderline High  150-199 mg/dL  High             200-499 mg/dL  Very High        >500 mg/dL    HDL Reference Ranges  (U.S. Department of Health and Human Services ATP III Classifcations)    Low     <40 mg/dl (major risk factor for CHD)  High    >60 mg/dl ('negative' risk factor for CHD)        LDL Reference Ranges  (U.S. Department of Health and Human Services ATP III Classifcations)    Optimal          <100 mg/dL  Near Optimal     100-129 mg/dL  Borderline High  130-159 mg/dL  High             160-189 mg/dL  Very High        >189 mg/dL    Magnesium [[id number]]  (Normal) Collected: 01/09/21 0208    Specimen: Blood Updated: 01/09/21 0409     Magnesium 1.7 mg/dL     Phosphorus [[id number]]  (Normal) Collected: 01/09/21 0208    Specimen: Blood Updated:  01/09/21 0409     Phosphorus 3.1 mg/dL     CBC Auto Differential [[id number]]  (Abnormal) Collected: 01/09/21 0208    Specimen: Blood Updated: 01/09/21 0346     WBC 11.22 10*3/mm3      RBC 4.38 10*6/mm3      Hemoglobin 13.0 g/dL      Hematocrit 37.3 %      MCV 85.2 fL      MCH 29.7 pg      MCHC 34.9 g/dL      RDW 12.8 %      RDW-SD 39.2 fl      MPV 11.4 fL      Platelets 208 10*3/mm3      Neutrophil % 63.7 %      Lymphocyte % 18.6 %      Monocyte % 16.8 %      Eosinophil % 0.2 %      Basophil % 0.3 %      Immature Grans % 0.4 %      Neutrophils, Absolute 7.14 10*3/mm3      Lymphocytes, Absolute 2.09 10*3/mm3      Monocytes, Absolute 1.89 10*3/mm3      Eosinophils, Absolute 0.02 10*3/mm3      Basophils, Absolute 0.03 10*3/mm3      Immature Grans, Absolute 0.05 10*3/mm3      nRBC 0.0 /100 WBC     Blood Culture - Blood, Arm, Left [[id number]] Collected: 01/08/21 0046    Specimen: Blood from Arm, Left Updated: 01/09/21 0100     Blood Culture No growth at 24 hours    Blood Culture - Blood, Arm, Right [[id number]] Collected: 01/08/21 0046    Specimen: Blood from Arm, Right Updated: 01/09/21 0100     Blood Culture No growth at 24 hours    Troponin [[id number]]  (Abnormal) Collected: 01/08/21 2229    Specimen: Blood Updated: 01/08/21 2305     Troponin T 0.617 ng/mL     Narrative:      Troponin T Reference Range:  <= 0.03 ng/mL-   Negative for AMI  >0.03 ng/mL-     Abnormal for myocardial necrosis.  Clinicians would have to utilize clinical acumen, EKG, Troponin and serial changes to determine if it is an Acute Myocardial Infarction or myocardial injury due to an underlying chronic condition.       Results may be falsely decreased if patient taking Biotin.      Troponin [[id number]]  (Abnormal) Collected: 01/08/21 1934    Specimen: Blood Updated: 01/08/21 2017     Troponin T 0.539 ng/mL     Narrative:      Troponin T Reference Range:  <= 0.03 ng/mL-   Negative for AMI  >0.03 ng/mL-     Abnormal for myocardial necrosis.   Clinicians would have to utilize clinical acumen, EKG, Troponin and serial changes to determine if it is an Acute Myocardial Infarction or myocardial injury due to an underlying chronic condition.       Results may be falsely decreased if patient taking Biotin.      POC Glucose Once [[id number]]  (Abnormal) Collected: 01/08/21 2001    Specimen: Blood Updated: 01/08/21 2013     Glucose 144 mg/dL      Comment: : [id number] Page AlexisMeter ID: [id number]             Imaging Results (Last 24 Hours)     Procedure Component Value Units Date/Time    SCANNED - IMAGING [[id number]] Resulted: 01/07/21      Updated: 01/09/21 0944             Intake/Output Summary (Last 24 hours) at 1/9/2021 1801  Last data filed at 1/9/2021 1602  Gross per 24 hour   Intake 480 ml   Output 2750 ml   Net -2270 ml       Physical Exam  Constitutional:       General: He is not in acute distress.     Appearance: Normal appearance.      Interventions: Nasal cannula in place.   HENT:      Head: Normocephalic and atraumatic.      Nose: Nose normal.      Mouth: Mucous membranes are moist.      Pharynx: Oropharynx is clear.   Eyes:      General: No scleral icterus.     Conjunctiva/sclera: Conjunctivae normal.   Neck:      Musculoskeletal: Normal range of motion and neck supple.   Cardiovascular:      Pulses: Normal pulses.      Heart sounds: Normal heart sounds.   Pulmonary:      Effort: Pulmonary effort is normal. No tachypnea, accessory muscle usage or respiratory distress.      Breath sounds: Clear bilaterally  Abdominal:      General: Abdomen is flat.      Palpations: Abdomen is soft. There is no mass.      Tenderness: There is no abdominal tenderness.   Musculoskeletal: Normal range of motion.      Right lower leg: Edema (1/4) present.      Left lower leg: Edema (1/4) present.   Lymphadenopathy:      Cervical: No cervical adenopathy.   Skin:     General: Skin is warm and dry.   Neurological:      General: No focal deficit present.      Mental  Status: He is alert and oriented to person, place, and time. Mental status is at baseline.   Psychiatric:         Mood and Affect: Mood normal.         Judgment: Judgment normal.        Results Review:  I have reviewed the labs, radiology results, and diagnostic studies since my last progress note and made treatment changes reflective of the results.   I have reviewed the current medications.    Assessment/Plan     Active Hospital Problems    Diagnosis   • **Acute systolic congestive heart failure (CMS/HCC)   • NSTEMI (non-ST elevated myocardial infarction) (CMS/HCC)   • Acute pulmonary edema (CMS/HCC)   • Diabetes mellitus (CMS/HCC)   • Hypertension   • Hyperlipidemia       PLAN:  Discontinue IV Lasix in favor of Lasix 40 mg p.o. every morning  BMP in a.m.  Ambulation encouraged  Cardiac catheterization per cardiology    Electronically signed by Magdiel Schultz DO, 01/09/21, 18:01 CST.

## 2021-01-10 NOTE — PLAN OF CARE
Goal Outcome Evaluation:  Plan of Care Reviewed With: patient  Progress: no change  Outcome Summary: VSS this shift. No complaints of pain. Patient on RA and O2 sats are WNL. Good UOP per urinal. NPO at midnight for probable heart cath in the AM. Potassium is 3.4, PO potassium given. Patient on PO diuretics. Safety maintained, no falls.

## 2021-01-10 NOTE — PROGRESS NOTES
"[facility], [facility] Progress Note     LOS: 2 days   Patient Care Team:  Alfredito Land MD as PCP - General (Family Medicine)    Chief Complaint:  SOB    Subjective   The patient continues to feel better.  His breathing has improved.  He has been without supplemental O2 since yesterday afternoon.  He denies orthopnea.  He denies CP.      Review of Systems:   A 10-point review of systems is obtained and negative except for otherwise mentioned above.    Objective     Vital Sign Min/Max for last 24 hours  Temp  Min: 97.9 °F (36.6 °C)  Max: 98.5 °F (36.9 °C)   BP  Min: 115/61  Max: 129/65   Pulse  Min: 73  Max: 89   Resp  Min: 16  Max: 18   SpO2  Min: 91 %  Max: 97 %   No data recorded   Weight  Min: 87 kg (191 lb 12.8 oz)  Max: 87 kg (191 lb 12.8 oz)     Flowsheet Rows      First Filed Value   Admission Height  175.3 cm (69\") Documented at 01/07/2021 2353   Admission Weight  90.7 kg (200 lb) Documented at 01/07/2021 2353          Physical Exam:   General Appearance: NAD  Lungs: crackles heard at B bases  Heart: RRR  Extremities: no edema     Results Review:     I reviewed the patient's new clinical results.    Results from last 7 days   Lab Units 01/09/21  0208   WBC 10*3/mm3 11.22*   HEMOGLOBIN g/dL 13.0   HEMATOCRIT % 37.3*   PLATELETS 10*3/mm3 208     Results from last 7 days   Lab Units 01/10/21  0350   SODIUM mmol/L 136   POTASSIUM mmol/L 3.4*   CHLORIDE mmol/L 99   CO2 mmol/L 27.0   BUN mg/dL 20   CREATININE mg/dL 0.78   GLUCOSE mg/dL 148*   CALCIUM mg/dL 9.3     Results from last 7 days   Lab Units 01/10/21  0350 01/09/21  0208   SODIUM mmol/L 136 136   POTASSIUM mmol/L 3.4* 3.7   CHLORIDE mmol/L 99 98   CO2 mmol/L 27.0 27.0   BUN mg/dL 20 18   CREATININE mg/dL 0.78 0.84   CALCIUM mg/dL 9.3 9.3   BILIRUBIN mg/dL  --  0.6   ALK PHOS U/L  --  55   ALT (SGPT) U/L  --  15   AST (SGOT) U/L  --  39   GLUCOSE mg/dL 148* 161*     Results from last 7 days   Lab Units " 01/09/21  0447 01/09/21  0208 01/08/21  2229   TROPONIN T ng/mL 0.646* 0.717* 0.617*         Results from last 7 days   Lab Units 01/09/21  0208   CHOLESTEROL mg/dL 151   TRIGLYCERIDES mg/dL 179*   HDL CHOL mg/dL 38*   LDL CHOL mg/dL 82       Medication Review: yes    Assessment/Plan       Acute systolic congestive heart failure (CMS/HCC)    NSTEMI (non-ST elevated myocardial infarction) (CMS/[facility])    Acute pulmonary edema (CMS/[facility])    Diabetes mellitus (CMS/[facility])    Hypertension    Hyperlipidemia  Hypokalemia    Continue PO diuresis.  Monitor labs.  Probable heart cath tomorrow.  Continue ASA, Plavix, Lovenox, BB, ACE-I, and statin.  Leslye Hester PA-C  01/10/21  12:07 CST

## 2021-01-10 NOTE — PROGRESS NOTES
[facility] Medicine Services  INPATIENT PROGRESS NOTE    Length of Stay: 2  Date of Admission: 1/7/2021  Primary Care Physician: Alfredito Land MD    Subjective     Chief Complaint:     No specific complaints today    HPI     The patient is on room air and feels much better after diuresis.  Potassium slightly low at 3.4 and has been supplemented.  I will go ahead and start him on routine daily potassium supplementation.  He has been converted to oral diuretics.  Cardiology plans heart cath probably tomorrow.  He will continue on aspirin, Plavix, Lovenox, beta-blocker, ACE inhibitor and statin.      Review of Systems     All pertinent negatives and positives are as above. All other systems have been reviewed and are negative unless otherwise stated.     Objective    Temp:  [97.9 °F (36.6 °C)-98.5 °F (36.9 °C)] 98 °F (36.7 °C)  Heart Rate:  [73-89] 76  Resp:  [16-18] 18  BP: (115-129)/(56-65) 129/65    Lab Results (last 24 hours)     Procedure Component Value Units Date/Time    POC Glucose Once [[id number]]  (Abnormal) Collected: 01/10/21 1108    Specimen: Blood Updated: 01/10/21 1120     Glucose 174 mg/dL      Comment: : [id number] Ron Tinajero.Club DomainsMeter ID: [id number]       Respiratory Culture - Sputum, Cough [[id number]] Collected: 01/08/21 1544    Specimen: Sputum from Cough Updated: 01/10/21 0747     Respiratory Culture Scant growth (1+) Normal Respiratory Karen: NO S.aureus/MRSA or Pseudomonas aeruginosa     Gram Stain Greater than 25 Epithelial cells per low power field      Less than 25 WBCs per low power field      Rare (1+) Gram negative bacilli      Few (2+) Gram positive bacilli      Few (2+) Gram positive cocci in pairs    POC Glucose Once [[id number]]  (Abnormal) Collected: 01/10/21 0730    Specimen: Blood Updated: 01/10/21 0741     Glucose 175 mg/dL      Comment: : [id number] Ron Tinajero) MoximedylieMeter ID: [id number]       Basic Metabolic Panel  [[id number]]  (Abnormal) Collected: 01/10/21 0350    Specimen: Blood Updated: 01/10/21 0512     Glucose 148 mg/dL      BUN 20 mg/dL      Creatinine 0.78 mg/dL      Sodium 136 mmol/L      Potassium 3.4 mmol/L      Chloride 99 mmol/L      CO2 27.0 mmol/L      Calcium 9.3 mg/dL      eGFR Non African Amer 98 mL/min/1.73      BUN/Creatinine Ratio 25.6     Anion Gap 10.0 mmol/L     Narrative:      GFR Normal >60  Chronic Kidney Disease <60  Kidney Failure <15      Blood Culture - Blood, Arm, Left [[id number]] Collected: 01/08/21 0046    Specimen: Blood from Arm, Left Updated: 01/10/21 0100     Blood Culture No growth at 2 days    Blood Culture - Blood, Arm, Right [[id number]] Collected: 01/08/21 0046    Specimen: Blood from Arm, Right Updated: 01/10/21 0100     Blood Culture No growth at 2 days    POC Glucose Once [[id number]]  (Abnormal) Collected: 01/09/21 1949    Specimen: Blood Updated: 01/09/21 2012     Glucose 230 mg/dL      Comment: : [id number] Elio Goins ID: [id number]       POC Glucose Once [[id number]]  (Abnormal) Collected: 01/09/21 1641    Specimen: Blood Updated: 01/09/21 1652     Glucose 159 mg/dL      Comment: : [id number] Ron (Brandt) KaylieMeter ID: [id number]             Imaging Results (Last 24 Hours)     ** No results found for the last 24 hours. **             Intake/Output Summary (Last 24 hours) at 1/10/2021 1449  Last data filed at 1/10/2021 1300  Gross per 24 hour   Intake 800 ml   Output 2875 ml   Net -2075 ml       Physical Exam  Constitutional:       General: He is not in acute distress.     Appearance: Normal appearance.      Interventions: Nasal cannula in place.   HENT:      Head: Normocephalic and atraumatic.      Nose: Nose normal.      Mouth: Mucous membranes are moist.      Pharynx: Oropharynx is clear.   Eyes:      General: No scleral icterus.     Conjunctiva/sclera: Conjunctivae normal.   Neck:      Musculoskeletal: Normal range of motion and neck supple.    Cardiovascular:      Pulses: Normal pulses.      Heart sounds: Normal heart sounds.   Pulmonary:      Effort: Pulmonary effort is normal. No tachypnea, accessory muscle usage or respiratory distress.      Breath sounds: Clear bilaterally  Abdominal:      General: Abdomen is flat.      Palpations: Abdomen is soft. There is no mass.      Tenderness: There is no abdominal tenderness.   Musculoskeletal: Normal range of motion.      Right lower leg: No edema     Left lower leg: No edema  Lymphadenopathy:      Cervical: No cervical adenopathy.   Skin:     General: Skin is warm and dry.   Neurological:      General: No focal deficit present.      Mental Status: He is alert and oriented to person, place, and time. Mental status is at baseline.   Psychiatric:         Mood and Affect: Mood normal.         Judgment: Judgment normal.     Results Review:  I have reviewed the labs, radiology results, and diagnostic studies since my last progress note and made treatment changes reflective of the results.   I have reviewed the current medications.    Assessment/Plan     Active Hospital Problems    Diagnosis   • **Acute systolic congestive heart failure (CMS/HCC)   • NSTEMI (non-ST elevated myocardial infarction) (CMS/HCC)   • Acute pulmonary edema (CMS/HCC)   • Diabetes mellitus (CMS/HCC)   • Hypertension   • Hyperlipidemia       PLAN:  Continue oral Lasix  BMP in a.m.  Ambulation encouraged  Cardiac catheterization per cardiology    Electronically signed by Magdiel Schultz DO, 01/10/21, 14:49 CST.

## 2021-01-10 NOTE — PROGRESS NOTES
Discharge Planning Assessment  [facility]     Patient Name: Wesley Eduardo  MRN: [id number]  Today's Date: 1/10/2021    Admit Date: 1/7/2021    Discharge Needs Assessment     Row Name 01/10/21 1016       Living Environment    Lives With  spouse    Current Living Arrangements  home/apartment/condo    Primary Care Provided by  self    Provides Primary Care For  no one    Family Caregiver if Needed  spouse    Quality of Family Relationships  helpful;involved;supportive    Able to Return to Prior Arrangements  yes       Resource/Environmental Concerns    Resource/Environmental Concerns  none    Transportation Concerns  car, none       Transition Planning    Patient/Family Anticipates Transition to  home    Patient/Family Anticipated Services at Transition  none    Transportation Anticipated  family or friend will provide       Discharge Needs Assessment    Readmission Within the Last 30 Days  no previous admission in last 30 days    Equipment Currently Used at Home  none    Concerns to be Addressed  no discharge needs identified;denies needs/concerns at this time    Anticipated Changes Related to Illness  none    Equipment Needed After Discharge  none    Discharge Coordination/Progress  Pt [facility] Pt plans on returning home with his wife when medically stable. SW will follow and assist with any discharge needs that may arise.        Discharge Plan    No documentation.       Continued Care and Services - Admitted Since 1/7/2021    Coordination has not been started for this encounter.         Demographic Summary    No documentation.       Functional Status    No documentation.       Psychosocial    No documentation.       Abuse/Neglect    No documentation.       Legal    No documentation.       Substance Abuse    No documentation.       Patient Forms    No documentation.           Qi Garvin

## 2021-01-11 LAB
ANION GAP SERPL CALCULATED.3IONS-SCNC: 13 MMOL/L (ref 5–15)
ANION GAP SERPL CALCULATED.3IONS-SCNC: 13 MMOL/L (ref 5–15)
BASOPHILS # BLD AUTO: 0.04 10*3/MM3 (ref 0–0.2)
BASOPHILS NFR BLD AUTO: 0.5 % (ref 0–1.5)
BUN SERPL-MCNC: 18 MG/DL (ref 8–23)
BUN SERPL-MCNC: 20 MG/DL (ref 8–23)
BUN/CREAT SERPL: 22.5 (ref 7–25)
BUN/CREAT SERPL: 28.6 (ref 7–25)
CALCIUM SPEC-SCNC: 10.2 MG/DL (ref 8.6–10.5)
CALCIUM SPEC-SCNC: 9.7 MG/DL (ref 8.6–10.5)
CHLORIDE SERPL-SCNC: 100 MMOL/L (ref 98–107)
CHLORIDE SERPL-SCNC: 98 MMOL/L (ref 98–107)
CO2 SERPL-SCNC: 23 MMOL/L (ref 22–29)
CO2 SERPL-SCNC: 26 MMOL/L (ref 22–29)
CREAT SERPL-MCNC: 0.7 MG/DL (ref 0.76–1.27)
CREAT SERPL-MCNC: 0.8 MG/DL (ref 0.76–1.27)
DEPRECATED RDW RBC AUTO: 39.6 FL (ref 37–54)
EOSINOPHIL # BLD AUTO: 0.01 10*3/MM3 (ref 0–0.4)
EOSINOPHIL NFR BLD AUTO: 0.1 % (ref 0.3–6.2)
ERYTHROCYTE [DISTWIDTH] IN BLOOD BY AUTOMATED COUNT: 12.8 % (ref 12.3–15.4)
GFR SERPL CREATININE-BSD FRML MDRD: 111 ML/MIN/1.73
GFR SERPL CREATININE-BSD FRML MDRD: 95 ML/MIN/1.73
GLUCOSE BLDC GLUCOMTR-MCNC: 191 MG/DL (ref 70–130)
GLUCOSE BLDC GLUCOMTR-MCNC: 206 MG/DL (ref 70–130)
GLUCOSE BLDC GLUCOMTR-MCNC: 224 MG/DL (ref 70–130)
GLUCOSE SERPL-MCNC: 159 MG/DL (ref 65–99)
GLUCOSE SERPL-MCNC: 176 MG/DL (ref 65–99)
HCT VFR BLD AUTO: 42.9 % (ref 37.5–51)
HGB BLD-MCNC: 15 G/DL (ref 13–17.7)
IMM GRANULOCYTES # BLD AUTO: 0.02 10*3/MM3 (ref 0–0.05)
IMM GRANULOCYTES NFR BLD AUTO: 0.3 % (ref 0–0.5)
LYMPHOCYTES # BLD AUTO: 1.97 10*3/MM3 (ref 0.7–3.1)
LYMPHOCYTES NFR BLD AUTO: 25.2 % (ref 19.6–45.3)
MAGNESIUM SERPL-MCNC: 1.9 MG/DL (ref 1.6–2.4)
MCH RBC QN AUTO: 29.8 PG (ref 26.6–33)
MCHC RBC AUTO-ENTMCNC: 35 G/DL (ref 31.5–35.7)
MCV RBC AUTO: 85.1 FL (ref 79–97)
MONOCYTES # BLD AUTO: 1.36 10*3/MM3 (ref 0.1–0.9)
MONOCYTES NFR BLD AUTO: 17.4 % (ref 5–12)
NEUTROPHILS NFR BLD AUTO: 4.42 10*3/MM3 (ref 1.7–7)
NEUTROPHILS NFR BLD AUTO: 56.5 % (ref 42.7–76)
NRBC BLD AUTO-RTO: 0 /100 WBC (ref 0–0.2)
PLATELET # BLD AUTO: 245 10*3/MM3 (ref 140–450)
PMV BLD AUTO: 10.5 FL (ref 6–12)
POTASSIUM SERPL-SCNC: 4 MMOL/L (ref 3.5–5.2)
POTASSIUM SERPL-SCNC: 4.1 MMOL/L (ref 3.5–5.2)
RBC # BLD AUTO: 5.04 10*6/MM3 (ref 4.14–5.8)
SODIUM SERPL-SCNC: 136 MMOL/L (ref 136–145)
SODIUM SERPL-SCNC: 137 MMOL/L (ref 136–145)
WBC # BLD AUTO: 7.82 10*3/MM3 (ref 3.4–10.8)

## 2021-01-11 PROCEDURE — C1769 GUIDE WIRE: HCPCS | Performed by: INTERNAL MEDICINE

## 2021-01-11 PROCEDURE — 4A023N7 MEASUREMENT OF CARDIAC SAMPLING AND PRESSURE, LEFT HEART, PERCUTANEOUS APPROACH: ICD-10-PCS | Performed by: INTERNAL MEDICINE

## 2021-01-11 PROCEDURE — 80048 BASIC METABOLIC PNL TOTAL CA: CPT | Performed by: PHYSICIAN ASSISTANT

## 2021-01-11 PROCEDURE — 25010000002 HEPARIN (PORCINE) 2000-0.9 UNIT/L-% SOLUTION: Performed by: INTERNAL MEDICINE

## 2021-01-11 PROCEDURE — 99153 MOD SED SAME PHYS/QHP EA: CPT | Performed by: INTERNAL MEDICINE

## 2021-01-11 PROCEDURE — 94799 UNLISTED PULMONARY SVC/PX: CPT

## 2021-01-11 PROCEDURE — 99222 1ST HOSP IP/OBS MODERATE 55: CPT | Performed by: NURSE PRACTITIONER

## 2021-01-11 PROCEDURE — 25010000002 MIDAZOLAM HCL (PF) 5 MG/5ML SOLUTION: Performed by: INTERNAL MEDICINE

## 2021-01-11 PROCEDURE — 25010000002 HEPARIN (PORCINE) 1000-0.9 UT/500ML-% SOLUTION: Performed by: INTERNAL MEDICINE

## 2021-01-11 PROCEDURE — 0 IOPAMIDOL PER 1 ML: Performed by: INTERNAL MEDICINE

## 2021-01-11 PROCEDURE — 85025 COMPLETE CBC W/AUTO DIFF WBC: CPT | Performed by: PHYSICIAN ASSISTANT

## 2021-01-11 PROCEDURE — 82962 GLUCOSE BLOOD TEST: CPT

## 2021-01-11 PROCEDURE — C1894 INTRO/SHEATH, NON-LASER: HCPCS | Performed by: INTERNAL MEDICINE

## 2021-01-11 PROCEDURE — 83735 ASSAY OF MAGNESIUM: CPT | Performed by: NURSE PRACTITIONER

## 2021-01-11 PROCEDURE — B2151ZZ FLUOROSCOPY OF LEFT HEART USING LOW OSMOLAR CONTRAST: ICD-10-PCS | Performed by: INTERNAL MEDICINE

## 2021-01-11 PROCEDURE — 25010000002 FENTANYL CITRATE (PF) 100 MCG/2ML SOLUTION: Performed by: INTERNAL MEDICINE

## 2021-01-11 PROCEDURE — 99152 MOD SED SAME PHYS/QHP 5/>YRS: CPT | Performed by: INTERNAL MEDICINE

## 2021-01-11 PROCEDURE — 93458 L HRT ARTERY/VENTRICLE ANGIO: CPT | Performed by: INTERNAL MEDICINE

## 2021-01-11 PROCEDURE — 25010000002 ENOXAPARIN PER 10 MG: Performed by: INTERNAL MEDICINE

## 2021-01-11 PROCEDURE — 63710000001 DIPHENHYDRAMINE PER 50 MG: Performed by: PHYSICIAN ASSISTANT

## 2021-01-11 PROCEDURE — B2111ZZ FLUOROSCOPY OF MULTIPLE CORONARY ARTERIES USING LOW OSMOLAR CONTRAST: ICD-10-PCS | Performed by: INTERNAL MEDICINE

## 2021-01-11 PROCEDURE — 63710000001 INSULIN LISPRO (HUMAN) PER 5 UNITS: Performed by: INTERNAL MEDICINE

## 2021-01-11 RX ORDER — SODIUM CHLORIDE 0.9 % (FLUSH) 0.9 %
10 SYRINGE (ML) INJECTION AS NEEDED
Status: DISCONTINUED | OUTPATIENT
Start: 2021-01-11 | End: 2021-01-18

## 2021-01-11 RX ORDER — HEPARIN SODIUM 200 [USP'U]/100ML
INJECTION, SOLUTION INTRAVENOUS AS NEEDED
Status: DISCONTINUED | OUTPATIENT
Start: 2021-01-11 | End: 2021-01-11 | Stop reason: HOSPADM

## 2021-01-11 RX ORDER — FENTANYL CITRATE 50 UG/ML
INJECTION, SOLUTION INTRAMUSCULAR; INTRAVENOUS AS NEEDED
Status: DISCONTINUED | OUTPATIENT
Start: 2021-01-11 | End: 2021-01-11 | Stop reason: HOSPADM

## 2021-01-11 RX ORDER — MIDAZOLAM HYDROCHLORIDE 1 MG/ML
INJECTION, SOLUTION INTRAMUSCULAR; INTRAVENOUS AS NEEDED
Status: DISCONTINUED | OUTPATIENT
Start: 2021-01-11 | End: 2021-01-11 | Stop reason: HOSPADM

## 2021-01-11 RX ORDER — SODIUM CHLORIDE 9 MG/ML
1-3 INJECTION, SOLUTION INTRAVENOUS CONTINUOUS
Status: DISCONTINUED | OUTPATIENT
Start: 2021-01-11 | End: 2021-01-18 | Stop reason: HOSPADM

## 2021-01-11 RX ORDER — SODIUM CHLORIDE 0.9 % (FLUSH) 0.9 %
3 SYRINGE (ML) INJECTION EVERY 12 HOURS SCHEDULED
Status: DISCONTINUED | OUTPATIENT
Start: 2021-01-11 | End: 2021-01-13

## 2021-01-11 RX ORDER — ACETAMINOPHEN 325 MG/1
650 TABLET ORAL EVERY 4 HOURS PRN
Status: DISCONTINUED | OUTPATIENT
Start: 2021-01-11 | End: 2021-01-18 | Stop reason: HOSPADM

## 2021-01-11 RX ADMIN — TAMSULOSIN HYDROCHLORIDE 0.4 MG: 0.4 CAPSULE ORAL at 08:43

## 2021-01-11 RX ADMIN — Medication 1 APPLICATION: at 08:44

## 2021-01-11 RX ADMIN — SODIUM CHLORIDE, PRESERVATIVE FREE 10 ML: 5 INJECTION INTRAVENOUS at 21:14

## 2021-01-11 RX ADMIN — ATORVASTATIN CALCIUM 40 MG: 40 TABLET, FILM COATED ORAL at 08:43

## 2021-01-11 RX ADMIN — CARVEDILOL 6.25 MG: 6.25 TABLET, FILM COATED ORAL at 17:34

## 2021-01-11 RX ADMIN — FAMOTIDINE 20 MG: 20 TABLET, FILM COATED ORAL at 08:43

## 2021-01-11 RX ADMIN — GABAPENTIN 600 MG: 300 CAPSULE ORAL at 21:13

## 2021-01-11 RX ADMIN — SODIUM CHLORIDE, PRESERVATIVE FREE 10 ML: 5 INJECTION INTRAVENOUS at 08:44

## 2021-01-11 RX ADMIN — POTASSIUM CHLORIDE 20 MEQ: 750 CAPSULE, EXTENDED RELEASE ORAL at 08:43

## 2021-01-11 RX ADMIN — DIPHENHYDRAMINE HYDROCHLORIDE 50 MG: 50 CAPSULE ORAL at 05:55

## 2021-01-11 RX ADMIN — GABAPENTIN 600 MG: 300 CAPSULE ORAL at 05:55

## 2021-01-11 RX ADMIN — LISINOPRIL 5 MG: 5 TABLET ORAL at 08:43

## 2021-01-11 RX ADMIN — AMITRIPTYLINE HYDROCHLORIDE 100 MG: 25 TABLET, FILM COATED ORAL at 21:13

## 2021-01-11 RX ADMIN — ASPIRIN 81 MG: 81 TABLET, CHEWABLE ORAL at 08:43

## 2021-01-11 RX ADMIN — GABAPENTIN 600 MG: 300 CAPSULE ORAL at 14:34

## 2021-01-11 RX ADMIN — CLOPIDOGREL 75 MG: 75 TABLET, FILM COATED ORAL at 08:43

## 2021-01-11 RX ADMIN — ENOXAPARIN SODIUM 90 MG: 100 INJECTION SUBCUTANEOUS at 21:13

## 2021-01-11 RX ADMIN — INSULIN LISPRO 2 UNITS: 100 INJECTION, SOLUTION INTRAVENOUS; SUBCUTANEOUS at 17:34

## 2021-01-11 RX ADMIN — INDAPAMIDE 1.25 MG: 1.25 TABLET, FILM COATED ORAL at 05:55

## 2021-01-11 RX ADMIN — CARVEDILOL 6.25 MG: 6.25 TABLET, FILM COATED ORAL at 08:43

## 2021-01-11 RX ADMIN — FUROSEMIDE 40 MG: 40 TABLET ORAL at 08:43

## 2021-01-11 NOTE — PLAN OF CARE
Goal Outcome Evaluation:  Plan of Care Reviewed With: patient  Progress: no change  Outcome Summary: VSS. Went for heart cath, R groin CDI, PPP. Cath showed severe 3 vessel disease. CTS consulted for probable CABG. Carotid ultrasound to be done tomorrow. S 68-82 with PACs, PVCs, and coup on tele. Safety maintained, no falls.

## 2021-01-11 NOTE — PLAN OF CARE
Goal Outcome Evaluation:  Plan of Care Reviewed With: patient  Progress: no change  Outcome Summary: VSS. No c/o pain. Rested well through night. Plans for heart cath today.

## 2021-01-11 NOTE — H&P (VIEW-ONLY)
"Referring Provider: Dr. Cade Hong  Reason for Consultation: \"CABG\"    Patient Care Team:  Alfredito Land MD as PCP - General (Family Medicine)    Chief complaint chest tightness, shortness of breath    Subjective .     History of present illness:  Mr. Eduardo is a 75 year old male with pertinent past medical history of type 2 diabetes mellitus with reported blood glucoses  at home, hypertension, hyperlipidemia, asymptomatic carotid artery stenosis followed by [facility] vascular surgery, and former tobacco use (smoked 1 ppd x 10 years, quit 40 years ago). He reports chest tightness and dyspnea with exertion that started about a year ago. He had relief upon rest and then was able to get up and complete activities. 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 On 1/8/2021, he presented to [facility] emergency department after worsening shortness of breath, chest tightness and pressure, and fatigue. He denied nausea or diaphoresis. He was found to be hypoxic and told he had pneumonia with WBC 24,000. He was given antibiotics. He was ruled in a NSTEMI and transferred to [facility] emergency department. He required high flow nasal cannula to maintain O2 saturations over 92%. CTA of the chest showed no evidence of pulmonary embolus but with bilateral small pleural effusions with pulmonary edema. He was admitted to the hospitalist service and cardiology was consulted. He was diuresed. His weight is down 9 pounds from admission weight. Transthoracic echocardiogram showed left ventricular function decreased, EF 36-40%, with no significant valvular dysfunction. Cardiac catheterization was performed today and showed severe three vessel coronary artery disease. Cardiothoracic surgery was consulted. Last dose of plavix was this morning.     Primary care provider is Dr. Land. Hemoglobin A1c 7.1%.     History  Code Status and Medical Interventions:   Ordered at: 01/08/21 0646     Level Of Support " Discussed With:    Patient     Code Status:    CPR     Medical Interventions (Level of Support Prior to Arrest):    Full     Past Medical History:   Diagnosis Date   • Diabetes mellitus (CMS/HCC)    • Hyperlipidemia    • Hypertension    ,   Past Surgical History:   Procedure Laterality Date   • HEMORRHOIDECTOMY     , History reviewed. No pertinent family history.,   Social History     Tobacco Use   • Smoking status: Never Smoker   • Smokeless tobacco: Never Used   Substance Use Topics   • Alcohol use: Never     Frequency: Never   • Drug use: Never   ,   Medications Prior to Admission   Medication Sig Dispense Refill Last Dose   • amitriptyline (ELAVIL) 100 MG tablet Take 100 mg by mouth Every Night.   1/6/2021   • amLODIPine (NORVASC) 5 MG tablet Take 5 mg by mouth Daily.   1/7/2021 at Unknown time   • aspirin 81 MG EC tablet Take 81 mg by mouth Daily.   1/7/2021 at Unknown time   • clopidogrel (PLAVIX) 75 MG tablet Take 75 mg by mouth Daily.   1/7/2021 at Unknown time   • gabapentin (NEURONTIN) 600 MG tablet Take 600 mg by mouth 4 (Four) Times a Day.   1/7/2021 at Unknown time   • indapamide (LOZOL) 1.25 MG tablet Take 1.25 mg by mouth Every Morning.   1/7/2021 at Unknown time   • metFORMIN (GLUCOPHAGE) 1000 MG tablet Take 1,000 mg by mouth 2 (Two) Times a Day With Meals.   1/7/2021 at Unknown time   • multivitamin with minerals tablet tablet Take 1 tablet by mouth Daily.   1/7/2021 at Unknown time   • ramipril (ALTACE) 10 MG capsule Take 10 mg by mouth Daily.   1/7/2021 at Unknown time   • raNITIdine (ZANTAC) 300 MG tablet Take 300 mg by mouth 2 (Two) Times a Day.   1/7/2021 at Unknown time   • simvastatin (ZOCOR) 40 MG tablet Take 40 mg by mouth Every Night.   1/6/2021   • tamsulosin (FLOMAX) 0.4 MG capsule 24 hr capsule Take 1 capsule by mouth Daily.   1/7/2021 at Unknown time   • traMADol-acetaminophen (ULTRACET) 37.5-325 MG per tablet Take 1 tablet by mouth 2 (Two) Times a Day As Needed for Moderate Pain .    "Past Week at Unknown time   , Allergies: Sulfa antibiotics and Quine [quinine]    Review of Systems  Review of Systems   Constitutional: Positive for activity change and fatigue. Negative for appetite change, chills, diaphoresis and fever.   HENT: Negative for trouble swallowing and voice change.    Respiratory: Positive for chest tightness and shortness of breath. Negative for cough.    Cardiovascular: Positive for chest pain. Negative for palpitations and leg swelling.   Gastrointestinal: Negative for constipation, diarrhea, nausea and vomiting.   Genitourinary: Negative for difficulty urinating.   Musculoskeletal: Positive for arthralgias.   Skin: Negative for rash and wound.   Neurological: Positive for headaches. Negative for dizziness, seizures, syncope and numbness.   Hematological: Bruises/bleeds easily.   Psychiatric/Behavioral: Negative for agitation, behavioral problems and confusion.        Objective     Vital Signs   Visit Vitals  /68 (BP Location: Right arm, Patient Position: Sitting)   Pulse 81   Temp 97.9 °F (36.6 °C) (Oral)   Resp 16   Ht 175.3 cm (69\")   Wt 87 kg (191 lb 12.8 oz)   SpO2 98%   BMI 28.32 kg/m²       Physical Exam  Vitals signs reviewed.   Constitutional:       General: He is not in acute distress.     Appearance: He is well-developed. He is not ill-appearing or diaphoretic.   HENT:      Head: Normocephalic and atraumatic.   Eyes:      Pupils: Pupils are equal, round, and reactive to light.   Neck:      Musculoskeletal: Normal range of motion and neck supple.   Cardiovascular:      Rate and Rhythm: Normal rate and regular rhythm.      Heart sounds: Normal heart sounds. No murmur. No friction rub.      Comments: Right groin clean, dry, intact post cardiac catheterization  Pulmonary:      Effort: Pulmonary effort is normal. No respiratory distress.      Breath sounds: Normal breath sounds. No wheezing or rales.   Abdominal:      General: There is no distension.      Palpations: " Abdomen is soft.      Tenderness: There is no abdominal tenderness. There is no guarding.   Musculoskeletal:      Right lower leg: No edema.      Left lower leg: No edema.   Skin:     General: Skin is warm and dry.      Coloration: Skin is not pale.      Findings: No rash.   Neurological:      Mental Status: He is alert and oriented to person, place, and time.   Psychiatric:         Behavior: Behavior normal.           LAB:   CBC:  Results from last 7 days   Lab Units 01/09/21  0208 01/08/21  0046   WBC 10*3/mm3 11.22* 18.43*   HEMATOCRIT % 37.3* 45.3   PLATELETS 10*3/mm3 208 307          BMP:)  Results from last 7 days   Lab Units 01/11/21  0428 01/10/21  0350 01/09/21  0208   SODIUM mmol/L 136 136 136   POTASSIUM mmol/L 4.0 3.4* 3.7   CHLORIDE mmol/L 100 99 98   CO2 mmol/L 23.0 27.0 27.0   GLUCOSE mg/dL 176* 148* 161*   BUN mg/dL 20 20 18   CREATININE mg/dL 0.70* 0.78 0.84           COAG:  Results from last 7 days   Lab Units 01/08/21  0046   INR  1.03           IMAGES:       Imaging Results (Last 24 Hours)     ** No results found for the last 24 hours. **        Findings:  Left heart catheterization:  Pressures:  1.  LV: 97/14 mmHg  2.  Aortic: 97/56 mmHg     Selective coronary angiography:  1. Left main: The left main is a long vessel trifurcates into the LAD, ramus intermedius, and left circumflex arteries.  There is severe 70 to 80% proximal stenosis and 60 to 70% distal stenosis.  2. Left anterior descending artery: The LAD runs in the interventricular groove giving off 2 diagonals and multiple septal perforators before wrapping around the apex as an apical recurrent branch.  There is a severe 90 to 95% stenosis in the LAD between the first and second diagonal branches.  There is a severe proximal 90 to 95% stenosis of the proximal first diagonal.  3. Ramus intermedius: There is a severe 80 to 90% stenosis in the proximal segment.  4. Left circumflex artery: The left circumflex is nondominant for posterior  circulation.  It gives off 1 significant obtuse marginal branch. There is 70 to 80% stenosis severe stenosis of the proximal circumflex.  5. Right coronary artery: The RCA is dominant for posterior circulation giving rise to PDA and right PL branches.  There is severe 90 to 95% proximal stenosis and 80 to 90% mid RCA stenosis.        Impression:  1.  Severe three-vessel coronary disease as described above.  2.  Very mildly elevated left heart filling pressures.     Plan:  -Routine post cath care.  -CT surgery consult for CABG  -Continue to optimize medical therapy for ischemic cardiomyopathy.    Echocardiogram Findings    Left Ventricle Left ventricular ejection fraction appears to be 36 - 40%. Left ventricular systolic function is moderately decreased.   Normal left ventricular cavity size and wall thickness noted. Left ventricular diastolic function is consistent with (grade I) impaired relaxation.   Right Ventricle Normal right ventricular cavity size and systolic function noted.   Left Atrium The left atrial cavity is borderline dilated.   Right Atrium Normal right atrial cavity size noted.   Aortic Valve The aortic valve is abnormal in structure. There is mild thickening of the aortic valve. The aortic valve appears trileaflet. No significant aortic valve regurgitation is present. No hemodynamically significant aortic valve stenosis is present.   Mitral Valve The mitral valve is grossly normal in structure. Mild mitral valve regurgitation is present. No significant mitral valve stenosis is present.   Tricuspid Valve The tricuspid valve is structurally normal with no significant stenosis present. Trace tricuspid valve regurgitation is present. Insufficient TR velocity profile to estimate the right ventricular systolic pressure.   Pulmonic Valve The pulmonic valve is not well visualized. There is no significant pulmonic valve regurgitation present. There is no pulmonic valve stenosis present.   Greater Vessels No  dilation of the aortic root is present. The inferior vena cava is normally sized. Normal IVC inspiratory collapse of greater than 50% noted.   Pericardium The pericardium is normal. There is no evidence of pericardial effusion. .      Wall Scoring    Score Index: 1.59              The following segments are hypokinetic: basal inferior, mid anterior, mid anteroseptal, mid inferoseptal, mid inferior, apical anterior, apical septal, apical inferior, apical lateral and apex.  All other segments are normal.               LV Measurements    Dimensions   LVIDd 5.3 cm      IVSd 1.1 cm      FS 37 %      ESV(cubed) 36.3 ml      EDV(cubed) 144.7 ml      LVOT area 2.8 cm^2      LVOT diam 1.9 cm      Diastolic Filling   MV E max carlin 47.5 cm/sec      MV A max carlin 96.2 cm/sec      MV dec time 0.16 sec      MV E/A 0.49       LA Volume Index 28.9 mL/m2      Med Peak E' Carlin 4.13 cm/sec      Lat Peak E' Carlin 4.5 cm/sec      Avg E/e' ratio 11.01       Shunt Ratio   SV(LVOT) 40.3 ml       Dimensions   LVIDs 3.3 cm      LVPWd 0.86 cm      IVS/LVPW 1.2       LV Sys Vol (BSA corrected) 29.7 ml/m^2      LV Joiner Vol (BSA corrected) 51 ml/m^2      LV mass(C)d 185.9 grams      EDV(MOD-sp4) 105 ml      ESV(MOD-sp4) 61.1 ml      Systolic Function   SV(MOD-sp4) 43.9 ml      SI(MOD-sp4) 21.3 ml/m^2      EF(MOD-sp4) 41.8 %         LA Measurements    LA Dimensions   LA dimension 4 cm      LA volume 59.6 cm3      LA Volume Index 28.9 mL/m2         Aortic Valve Measurements    Stenosis   LVOT diam 1.9 cm      LV V1 max 93 cm/sec      LV V1 max PG 3.5 mmHg      LV V1 mean PG 2 mmHg      LV V1 VTI 14.2 cm      Ao pk carlin 123 cm/sec       Stenosis   Ao max PG 6.1 mmHg      Ao mean PG 3 mmHg      Ao V2 VTI 16.8 cm      ERNESTO(I,D) 2.4 cm^2         Mitral Valve Measurements    Stenosis   MV dec time 0.16 sec          Tricuspid Valve Measurements    Regurgitation   TR max carlin 214 cm/sec      RVSP(TR) 23.3 mmHg      RAP systole 5 mmHg      PISA   TR max carlin 214  cm/sec          Pulmonic Valve Measurements    Stenosis   PA V2 max 134 cm/sec          Greater Vessels Measurements    Ao root diam 3.1 cm      Ao root area (BSA corrected) 1.5                         Assessment/Plan          Acute systolic congestive heart failure (CMS/HCC)    NSTEMI (non-ST elevated myocardial infarction) (CMS/HCC)    Acute pulmonary edema (CMS/HCC)    Diabetes mellitus (CMS/HCC)    Hypertension    Hyperlipidemia  Coronary artery disease, multivessel  Aspirin like platelet function defect, Last dose Plavix 1/11/2021  Bilateral carotid artery stenosis, asymptomatic    I discussed the patient's findings and my recommendations with patient and wife Tanvi Ortiz, We discussed the options for treatment of coronary artery disease to include medical therapy, coronary stenting, and surgical revascularization.  We discussed the pros and cons of each option and how it pertains to the current case. Pre operative testing will be obtained. Coronary artery bypass grafting is likely the best option given patient's known findings and risk factors.  We discussed the operative conduct and expected hospital and outpatient recovery.  Risks were discussed to include but not limited to bleeding, infection, stroke, heart attack, need for additional procedures, anesthesia risk, organ dysfunction and/or death, prolonged mechanical ventilation, prolonged ICU stay, chronic pain syndromes, sternal nonunion, and/or death.  We discussed the need to utilize all medical treatments additionally prescribed post surgery. We discussed timing will be permitted to repopulate and effective platelet population prior to surgery. Patient and wife are agreeable. All questions answered to the best of my ability.         Viky Bañuelos, APRN  01/11/21  14:18 CST

## 2021-01-11 NOTE — PROGRESS NOTES
[facility] Medicine Services  INPATIENT PROGRESS NOTE    Patient Name: Wesley Eduardo  Date of Admission: 1/7/2021  Today's Date: 01/11/21  Length of Stay: 3  Primary Care Physician: Alfredito Land MD    Subjective   Chief Complaint: Follow-up dyspnea  HPI   Patient seen and examined at bedside prior to cardiac catheterization.  Reports he is feeling well.  His breathing has significantly improved an he has been weaned off oxygen completely since 1/09.  He denies orthopnea and bilateral lower extremity edema.  His weight is down 9 lbs and he is -6 L since admission.      Review of Systems   All pertinent negatives and positives are as above. All other systems have been reviewed and are negative unless otherwise stated.     Objective    Temp:  [97.7 °F (36.5 °C)-98.4 °F (36.9 °C)] 97.9 °F (36.6 °C)  Heart Rate:  [73-88] 81  Resp:  [14-16] 16  BP: (118-137)/(64-82) 137/68  Physical Exam  Vitals signs reviewed.   Constitutional:       General: He is not in acute distress.     Appearance: He is not toxic-appearing.      Comments: Sitting up in bed.  Wife at bedside. Tolerating room air.   HENT:      Head: Normocephalic and atraumatic.      Mouth/Throat:      Mouth: Mucous membranes are moist.      Pharynx: Oropharynx is clear.   Eyes:      Extraocular Movements: Extraocular movements intact.      Conjunctiva/sclera: Conjunctivae normal.      Pupils: Pupils are equal, round, and reactive to light.   Neck:      Musculoskeletal: Normal range of motion and neck supple. No muscular tenderness.   Cardiovascular:      Rate and Rhythm: Normal rate and regular rhythm.      Pulses: Normal pulses.      Heart sounds: No murmur.      Comments: S 67-91  Pulmonary:      Effort: Pulmonary effort is normal.      Breath sounds: Normal breath sounds. No wheezing.   Abdominal:      General: Bowel sounds are normal. There is no distension.      Palpations: Abdomen is soft.      Tenderness: There  is no abdominal tenderness.   Musculoskeletal: Normal range of motion.         General: No swelling or tenderness.   Skin:     General: Skin is warm and dry.      Findings: No erythema or rash.   Neurological:      General: No focal deficit present.      Mental Status: He is alert and oriented to person, place, and time.      Cranial Nerves: No cranial nerve deficit.      Motor: No weakness.   Psychiatric:         Mood and Affect: Mood normal.         Behavior: Behavior normal.       Results Review:  I have reviewed the labs, radiology results, and diagnostic studies.    Laboratory Data:   Results from last 7 days   Lab Units 01/09/21  0208 01/08/21  0046   WBC 10*3/mm3 11.22* 18.43*   HEMOGLOBIN g/dL 13.0 15.5   HEMATOCRIT % 37.3* 45.3   PLATELETS 10*3/mm3 208 307        Results from last 7 days   Lab Units 01/11/21  0428 01/10/21  0350 01/09/21  0208 01/08/21  0046   SODIUM mmol/L 136 136 136 134*   POTASSIUM mmol/L 4.0 3.4* 3.7 5.0   CHLORIDE mmol/L 100 99 98 98   CO2 mmol/L 23.0 27.0 27.0 23.0   BUN mg/dL 20 20 18 20   CREATININE mg/dL 0.70* 0.78 0.84 0.76   CALCIUM mg/dL 9.7 9.3 9.3 10.1   BILIRUBIN mg/dL  --   --  0.6 0.5   ALK PHOS U/L  --   --  55 68   ALT (SGPT) U/L  --   --  15 17   AST (SGOT) U/L  --   --  39 29   GLUCOSE mg/dL 176* 148* 161* 230*     I have reviewed the patient's current medications.     Assessment/Plan     Active Hospital Problems    Diagnosis   • **Acute systolic congestive heart failure (CMS/HCC)   • NSTEMI (non-ST elevated myocardial infarction) (CMS/HCC)   • Acute pulmonary edema (CMS/HCC)   • Diabetes mellitus (CMS/HCC)   • Hypertension   • Hyperlipidemia     Plan:  1.  Patient was transferred from [facility] on 1/7 for complaints of shortness of breath.  He was found to have a NSTEMI was transferred to our facility for higher level of care. He was also found to be hypoxic and tachypnic.  He was placed on a Vapotherm at 20 L at 50%. CTA of the chest negative for  pulmonary embolus.  Concern for fluid overload with bilateral small pleural effusions.  ProBNP 1,608.  He was started on diruesis.  2.  Cardiology evaluating patient.  Echo on 1/8 shows ejection fraction of 30 to 40% with grade 1 diastolic dysfunction.  Lasix was transitioned to oral on 1/10.  Continue aspirin, beta-blocker, ACE-I and statin.  Continue full anticoagulation with Lovenox.  Cardiac catheterization today showed severe three-vessel coronary disease.  CT surgery consulted.  3.  A1c 7.1.  Continue sliding scale insulin.  Metformin on hold.  4.  Work-up ongoing.    Discharge Planning: Indeterminate at present.    Electronically signed by ANGEL Berry, 01/11/21, 12:10 CST.    I personally evaluated and examined the patient in conjunction with ANGEL Winters and agree with the assessment, treatment plan, and disposition of the patient as recorded by her. My history, exam, and further recommendations are:     Discussed with his nurse, Sindhu.    He is still on bedrest from his left heart cath.  Unfortunately, he requires CT surgery consult.  He has been evaluated by the nurse practitioner.  He has recently been on Plavix.  We plan medical optimization and likely surgery next Monday at this point. He states that he is okay with that.  He had a friend who had a similar situation and chose to go home prior to surgery and ended up passing away.    Electronically signed by Rolando Simental DO, 1/11/2021, 16:26 CST.

## 2021-01-12 ENCOUNTER — APPOINTMENT (OUTPATIENT)
Dept: ULTRASOUND IMAGING | Facility: HOSPITAL | Age: 76
End: 2021-01-12

## 2021-01-12 ENCOUNTER — APPOINTMENT (OUTPATIENT)
Dept: GENERAL RADIOLOGY | Facility: HOSPITAL | Age: 76
End: 2021-01-12

## 2021-01-12 PROBLEM — I25.10 3-VESSEL CORONARY ARTERY DISEASE: Status: ACTIVE | Noted: 2021-01-12

## 2021-01-12 LAB
ANION GAP SERPL CALCULATED.3IONS-SCNC: 14 MMOL/L (ref 5–15)
BUN SERPL-MCNC: 18 MG/DL (ref 8–23)
BUN/CREAT SERPL: 24 (ref 7–25)
CALCIUM SPEC-SCNC: 9.8 MG/DL (ref 8.6–10.5)
CHLORIDE SERPL-SCNC: 98 MMOL/L (ref 98–107)
CO2 SERPL-SCNC: 22 MMOL/L (ref 22–29)
CREAT SERPL-MCNC: 0.75 MG/DL (ref 0.76–1.27)
DEPRECATED RDW RBC AUTO: 38.4 FL (ref 37–54)
ERYTHROCYTE [DISTWIDTH] IN BLOOD BY AUTOMATED COUNT: 12.6 % (ref 12.3–15.4)
GFR SERPL CREATININE-BSD FRML MDRD: 102 ML/MIN/1.73
GLUCOSE BLDC GLUCOMTR-MCNC: 146 MG/DL (ref 70–130)
GLUCOSE BLDC GLUCOMTR-MCNC: 189 MG/DL (ref 70–130)
GLUCOSE BLDC GLUCOMTR-MCNC: 264 MG/DL (ref 70–130)
GLUCOSE SERPL-MCNC: 174 MG/DL (ref 65–99)
HCT VFR BLD AUTO: 41.8 % (ref 37.5–51)
HGB BLD-MCNC: 14 G/DL (ref 13–17.7)
MCH RBC QN AUTO: 28.3 PG (ref 26.6–33)
MCHC RBC AUTO-ENTMCNC: 33.5 G/DL (ref 31.5–35.7)
MCV RBC AUTO: 84.4 FL (ref 79–97)
PLATELET # BLD AUTO: 229 10*3/MM3 (ref 140–450)
PMV BLD AUTO: 11.1 FL (ref 6–12)
POTASSIUM SERPL-SCNC: 4.1 MMOL/L (ref 3.5–5.2)
RBC # BLD AUTO: 4.95 10*6/MM3 (ref 4.14–5.8)
SARS-COV-2 RNA PNL SPEC NAA+PROBE: NOT DETECTED
SODIUM SERPL-SCNC: 134 MMOL/L (ref 136–145)
WBC # BLD AUTO: 9.89 10*3/MM3 (ref 3.4–10.8)

## 2021-01-12 PROCEDURE — 99232 SBSQ HOSP IP/OBS MODERATE 35: CPT | Performed by: INTERNAL MEDICINE

## 2021-01-12 PROCEDURE — 93880 EXTRACRANIAL BILAT STUDY: CPT | Performed by: SURGERY

## 2021-01-12 PROCEDURE — 71046 X-RAY EXAM CHEST 2 VIEWS: CPT

## 2021-01-12 PROCEDURE — 85027 COMPLETE CBC AUTOMATED: CPT | Performed by: INTERNAL MEDICINE

## 2021-01-12 PROCEDURE — 87635 SARS-COV-2 COVID-19 AMP PRB: CPT | Performed by: NURSE PRACTITIONER

## 2021-01-12 PROCEDURE — 82962 GLUCOSE BLOOD TEST: CPT

## 2021-01-12 PROCEDURE — 63710000001 INSULIN LISPRO (HUMAN) PER 5 UNITS: Performed by: INTERNAL MEDICINE

## 2021-01-12 PROCEDURE — 80048 BASIC METABOLIC PNL TOTAL CA: CPT | Performed by: INTERNAL MEDICINE

## 2021-01-12 PROCEDURE — 93880 EXTRACRANIAL BILAT STUDY: CPT

## 2021-01-12 PROCEDURE — 25010000002 ENOXAPARIN PER 10 MG: Performed by: INTERNAL MEDICINE

## 2021-01-12 RX ORDER — ATORVASTATIN CALCIUM 10 MG/1
20 TABLET, FILM COATED ORAL DAILY
Status: DISCONTINUED | OUTPATIENT
Start: 2021-01-13 | End: 2021-01-18 | Stop reason: HOSPADM

## 2021-01-12 RX ORDER — AMIODARONE HYDROCHLORIDE 200 MG/1
400 TABLET ORAL 2 TIMES DAILY WITH MEALS
Status: DISCONTINUED | OUTPATIENT
Start: 2021-01-13 | End: 2021-01-18 | Stop reason: HOSPADM

## 2021-01-12 RX ADMIN — TAMSULOSIN HYDROCHLORIDE 0.4 MG: 0.4 CAPSULE ORAL at 09:33

## 2021-01-12 RX ADMIN — INDAPAMIDE 1.25 MG: 1.25 TABLET, FILM COATED ORAL at 05:47

## 2021-01-12 RX ADMIN — ASPIRIN 81 MG: 81 TABLET, CHEWABLE ORAL at 09:33

## 2021-01-12 RX ADMIN — GABAPENTIN 600 MG: 300 CAPSULE ORAL at 15:49

## 2021-01-12 RX ADMIN — ENOXAPARIN SODIUM 90 MG: 100 INJECTION SUBCUTANEOUS at 21:18

## 2021-01-12 RX ADMIN — ATORVASTATIN CALCIUM 40 MG: 40 TABLET, FILM COATED ORAL at 09:33

## 2021-01-12 RX ADMIN — FUROSEMIDE 40 MG: 40 TABLET ORAL at 09:33

## 2021-01-12 RX ADMIN — AMITRIPTYLINE HYDROCHLORIDE 100 MG: 25 TABLET, FILM COATED ORAL at 21:18

## 2021-01-12 RX ADMIN — GABAPENTIN 600 MG: 300 CAPSULE ORAL at 05:47

## 2021-01-12 RX ADMIN — GABAPENTIN 600 MG: 300 CAPSULE ORAL at 21:18

## 2021-01-12 RX ADMIN — CARVEDILOL 6.25 MG: 6.25 TABLET, FILM COATED ORAL at 21:18

## 2021-01-12 RX ADMIN — SODIUM CHLORIDE, PRESERVATIVE FREE 10 ML: 5 INJECTION INTRAVENOUS at 09:34

## 2021-01-12 RX ADMIN — CARVEDILOL 6.25 MG: 6.25 TABLET, FILM COATED ORAL at 09:33

## 2021-01-12 RX ADMIN — SODIUM CHLORIDE, PRESERVATIVE FREE 10 ML: 5 INJECTION INTRAVENOUS at 21:18

## 2021-01-12 RX ADMIN — INSULIN LISPRO 2 UNITS: 100 INJECTION, SOLUTION INTRAVENOUS; SUBCUTANEOUS at 09:33

## 2021-01-12 RX ADMIN — LISINOPRIL 5 MG: 5 TABLET ORAL at 09:33

## 2021-01-12 RX ADMIN — POTASSIUM CHLORIDE 20 MEQ: 750 CAPSULE, EXTENDED RELEASE ORAL at 09:33

## 2021-01-12 RX ADMIN — FAMOTIDINE 20 MG: 20 TABLET, FILM COATED ORAL at 09:33

## 2021-01-12 RX ADMIN — ENOXAPARIN SODIUM 90 MG: 100 INJECTION SUBCUTANEOUS at 09:33

## 2021-01-12 NOTE — PROGRESS NOTES
"    RE:  Wesley Eduardo  :  1945    CC: Dyspnea         Subjective: Patient reports he is feeling well today.  His breathing is back to baseline.  He denies any chest pain.    ROS: Pertinent positives and negatives listed above.  All other systems reviewed and negative.    Objective:   /79 (BP Location: Left arm, Patient Position: Lying)   Pulse 77   Temp 97.2 °F (36.2 °C) (Oral)   Resp 16   Ht 175.3 cm (69\")   Wt 85.2 kg (187 lb 12.8 oz)   SpO2 97%   BMI 27.73 kg/m²   Temp:  [97.2 °F (36.2 °C)-98.3 °F (36.8 °C)] 97.2 °F (36.2 °C)  Heart Rate:  [69-80] 77  Resp:  [15-18] 16  BP: ()/(44-79) 127/79    Intake/Output Summary (Last 24 hours) at 2021 1055  Last data filed at 2021 0700  Gross per 24 hour   Intake 240 ml   Output 2300 ml   Net -2060 ml       Current Facility-Administered Medications:   •  acetaminophen (TYLENOL) tablet 650 mg, 650 mg, Oral, Q4H PRN, Cade Hong MD  •  acetaminophen (TYLENOL) tablet 650 mg, 650 mg, Oral, Q4H PRN, Cade Hong MD  •  amitriptyline (ELAVIL) tablet 100 mg, 100 mg, Oral, Nightly, Cade Hong MD, 100 mg at 213  •  aspirin chewable tablet 81 mg, 81 mg, Oral, Daily, Cade Hong MD, 81 mg at 21  •  atorvastatin (LIPITOR) tablet 40 mg, 40 mg, Oral, Daily, Cade Hong MD, 40 mg at 21  •  carvedilol (COREG) tablet 6.25 mg, 6.25 mg, Oral, BID With Meals, Cade Hong MD, 6.25 mg at 21  •  dextrose (D50W) 25 g/ 50mL Intravenous Solution 25 g, 25 g, Intravenous, Q15 Min PRN, Cade Hong MD  •  dextrose (GLUTOSE) oral gel 15 g, 15 g, Oral, Q15 Min PRN, Cade Hong MD  •  enoxaparin (LOVENOX) syringe 90 mg, 1 mg/kg, Subcutaneous, Q12H, Cade Hong MD, 90 mg at 21 0933  •  famotidine (PEPCID) tablet 20 mg, 20 mg, Oral, Daily, Cade Hong MD, 20 mg at 21 0933  •  furosemide (LASIX) tablet 40 mg, 40 mg, Oral, Daily, Cade Hong MD, 40 mg at 21 " 0933  •  gabapentin (NEURONTIN) capsule 600 mg, 600 mg, Oral, Q8H, Cade Hong MD, 600 mg at 01/12/21 0547  •  glucagon (human recombinant) (GLUCAGEN DIAGNOSTIC) injection 1 mg, 1 mg, Subcutaneous, Q15 Min PRN, Cade Hong MD  •  indapamide (LOZOL) tablet 1.25 mg, 1.25 mg, Oral, Q AM, Cade Hong MD, 1.25 mg at 01/12/21 0547  •  insulin lispro (humaLOG, ADMELOG) injection 2-7 Units, 2-7 Units, Subcutaneous, TID AC, Cade Hong MD, 2 Units at 01/12/21 0933  •  lisinopril (PRINIVIL,ZESTRIL) tablet 5 mg, 5 mg, Oral, Q24H, Cade Hong MD, 5 mg at 01/12/21 0933  •  ondansetron (ZOFRAN) injection 4 mg, 4 mg, Intravenous, Q6H PRN, Cade Hong MD  •  potassium chloride (MICRO-K) CR capsule 20 mEq, 20 mEq, Oral, Daily, Cade Hong MD, 20 mEq at 01/12/21 0933  •  [COMPLETED] Insert peripheral IV, , , Once **AND** sodium chloride 0.9 % flush 10 mL, 10 mL, Intravenous, PRN, Cade Hong MD  •  sodium chloride 0.9 % flush 10 mL, 10 mL, Intravenous, Q12H, Cade Hong MD, 10 mL at 01/12/21 0934  •  sodium chloride 0.9 % flush 10 mL, 10 mL, Intravenous, PRN, Codi Hester PA-C  •  sodium chloride 0.9 % flush 3 mL, 3 mL, Intravenous, Q12H, Codi Hester PA-C  •  sodium chloride 0.9 % infusion, 1-3 mL/kg/hr, Intravenous, Continuous, Codi Hester PA-C, Last Rate: 100 mL/hr at 01/11/21 1435, 1.149 mL/kg/hr at 01/11/21 1435  •  tamsulosin (FLOMAX) 24 hr capsule 0.4 mg, 0.4 mg, Oral, Daily, Cade Hong MD, 0.4 mg at 01/12/21 0933  •  traMADol-acetaminophen (ULTRACET) 37.5-325 MG per tablet 1 tablet, 1 tablet, Oral, BID PRN, Cade Hong MD, 1 tablet at 01/08/21 1504    Physical Exam:   Gen: Alert and oriented x3, no acute distress  Heart: Regular rate and rhythm, no murmurs, rubs, or gallops  Chest: Lungs clear to auscultation bilaterally, normal respiratory effort  Abd: Soft, non tender, bowel sounds are positive  Ext: No clubbing, cyanosis, or edema      Lab Results (last 24 hours)     Procedure Component Value Units Date/Time    POC Glucose Once [[id number]]  (Abnormal) Collected: 01/12/21 0811    Specimen: Blood Updated: 01/12/21 0827     Glucose 189 mg/dL      Comment: : [id number] Irma FernandeziaMeter ID: [id number]       Basic Metabolic Panel [[id number]]  (Abnormal) Collected: 01/12/21 0526    Specimen: Blood Updated: 01/12/21 0630     Glucose 174 mg/dL      BUN 18 mg/dL      Creatinine 0.75 mg/dL      Sodium 134 mmol/L      Potassium 4.1 mmol/L      Comment: Slight hemolysis detected by analyzer. Results may be affected.        Chloride 98 mmol/L      CO2 22.0 mmol/L      Calcium 9.8 mg/dL      eGFR Non African Amer 102 mL/min/1.73      BUN/Creatinine Ratio 24.0     Anion Gap 14.0 mmol/L     Narrative:      GFR Normal >60  Chronic Kidney Disease <60  Kidney Failure <15      CBC (No Diff) [[id number]]  (Normal) Collected: 01/12/21 0526    Specimen: Blood Updated: 01/12/21 0604     WBC 9.89 10*3/mm3      RBC 4.95 10*6/mm3      Hemoglobin 14.0 g/dL      Hematocrit 41.8 %      MCV 84.4 fL      MCH 28.3 pg      MCHC 33.5 g/dL      RDW 12.6 %      RDW-SD 38.4 fl      MPV 11.1 fL      Platelets 229 10*3/mm3     Blood Culture - Blood, Arm, Left [[id number]] Collected: 01/08/21 0046    Specimen: Blood from Arm, Left Updated: 01/12/21 0100     Blood Culture No growth at 4 days    Blood Culture - Blood, Arm, Right [[id number]] Collected: 01/08/21 0046    Specimen: Blood from Arm, Right Updated: 01/12/21 0100     Blood Culture No growth at 4 days    POC Glucose Once [[id number]]  (Abnormal) Collected: 01/11/21 1952    Specimen: Blood Updated: 01/11/21 2022     Glucose 206 mg/dL      Comment: : [id number] Tyler VickiferMeter ID: [id number]       POC Glucose Once [[id number]]  (Abnormal) Collected: 01/11/21 1654    Specimen: Blood Updated: 01/11/21 1706     Glucose 191 mg/dL      Comment: : [id number] Jerardo EuraisaMeter ID: [id number]       Basic Metabolic Panel  [[id number]]  (Abnormal) Collected: 01/11/21 1421    Specimen: Blood Updated: 01/11/21 1501     Glucose 159 mg/dL      BUN 18 mg/dL      Creatinine 0.80 mg/dL      Sodium 137 mmol/L      Potassium 4.1 mmol/L      Chloride 98 mmol/L      CO2 26.0 mmol/L      Calcium 10.2 mg/dL      eGFR Non African Amer 95 mL/min/1.73      BUN/Creatinine Ratio 22.5     Anion Gap 13.0 mmol/L     Narrative:      GFR Normal >60  Chronic Kidney Disease <60  Kidney Failure <15      CBC & Differential [[id number]]  (Abnormal) Collected: 01/11/21 1421    Specimen: Blood Updated: 01/11/21 1442    Narrative:      The following orders were created for panel order CBC & Differential.  Procedure                               Abnormality         Status                     ---------                               -----------         ------                     CBC Auto Differential[[id number]]        Abnormal            Final result                 Please view results for these tests on the individual orders.    CBC Auto Differential [[id number]]  (Abnormal) Collected: 01/11/21 1421    Specimen: Blood Updated: 01/11/21 1442     WBC 7.82 10*3/mm3      RBC 5.04 10*6/mm3      Hemoglobin 15.0 g/dL      Hematocrit 42.9 %      MCV 85.1 fL      MCH 29.8 pg      MCHC 35.0 g/dL      RDW 12.8 %      RDW-SD 39.6 fl      MPV 10.5 fL      Platelets 245 10*3/mm3      Neutrophil % 56.5 %      Lymphocyte % 25.2 %      Monocyte % 17.4 %      Eosinophil % 0.1 %      Basophil % 0.5 %      Immature Grans % 0.3 %      Neutrophils, Absolute 4.42 10*3/mm3      Lymphocytes, Absolute 1.97 10*3/mm3      Monocytes, Absolute 1.36 10*3/mm3      Eosinophils, Absolute 0.01 10*3/mm3      Basophils, Absolute 0.04 10*3/mm3      Immature Grans, Absolute 0.02 10*3/mm3      nRBC 0.0 /100 WBC         Imaging Results (Last 24 Hours)     ** No results found for the last 24 hours. **        -CTA chest (1/8/2021) no evidence of pulmonary emboli, pleural effusions, central groundglass opacities,  interlobular septal thickening most concerning for fluid overload  -Echo (1/8/2020) EF 36-40%, multiple regional wall motion abnormalities, grade 1 diastolic dysfunction, normal RV size and function, no significant valve dysfunction  -Hemoglobin A1c (1/9/2021) 7.1%  -Lipid panel (1/9/2021) total cholesterol 151, HDL 38, LDL 82, triglycerides 179  -LHC (1/11/2021) severe three-vessel CAD, mildly elevated left heart filling pressures    Assessment:   1.  Acute systolic congestive heart failure: Elevated proBNP with evidence of volume overload on CT. significantly improved with medical therapy.  2.  Non-STEMI: Severe three-vessel CAD on [facility].  3.  Ischemic cardiomyopathy  4.  Diabetes mellitus type 2  5.  Peripheral vascular disease: History of carotid stenosis  6.  Essential hypertension  7.  Mixed hyperlipidemia      Plan:   -Continue aspirin, atorvastatin, carvedilol, oral furosemide, and lisinopril.  -Continue to optimize medical therapy as tolerated.  -CT surgery consulted and following.  Tentatively plan for surgery 1/18/2021 once Plavix is out of his system.  Given the non-STEMI and severity of his CAD, will be recommended for patient to remain in hospital until he has his CABG.

## 2021-01-12 NOTE — PROGRESS NOTES
[facility] Medicine Services  INPATIENT PROGRESS NOTE    Patient Name: Wesley Eduardo  Date of Admission: 1/7/2021  Today's Date: 01/12/21  Length of Stay: 4  Primary Care Physician: Alfredito Land MD    Subjective   Chief Complaint: Follow-up dyspnea  HPI   Patient seen and examined at bedside.  His breathing feels at baseline.  He denies chest pain or pressure.  He is undergoing preop testing for CABG with tentative date on Monday 1/18.  He denies any acute complaints at this time.    Review of Systems   All pertinent negatives and positives are as above. All other systems have been reviewed and are negative unless otherwise stated.     Objective    Temp:  [97.2 °F (36.2 °C)-98.3 °F (36.8 °C)] 97.8 °F (36.6 °C)  Heart Rate:  [69-80] 70  Resp:  [16-18] 18  BP: (108-143)/(44-79) 112/59  Physical Exam  Vitals signs reviewed.   Constitutional:       General: He is not in acute distress.     Appearance: He is not toxic-appearing.      Comments: Sitting up in bed. Tolerating room air.   HENT:      Head: Normocephalic and atraumatic.      Mouth/Throat:      Mouth: Mucous membranes are moist.      Pharynx: Oropharynx is clear.   Eyes:      Extraocular Movements: Extraocular movements intact.      Conjunctiva/sclera: Conjunctivae normal.      Pupils: Pupils are equal, round, and reactive to light.   Neck:      Musculoskeletal: Normal range of motion and neck supple. No muscular tenderness.   Cardiovascular:      Rate and Rhythm: Normal rate and regular rhythm.      Pulses: Normal pulses.      Heart sounds: No murmur.      Comments: S 86, 65-81 last night  Pulmonary:      Effort: Pulmonary effort is normal.      Breath sounds: Normal breath sounds. No wheezing.   Abdominal:      General: Bowel sounds are normal. There is no distension.      Palpations: Abdomen is soft.      Tenderness: There is no abdominal tenderness.   Musculoskeletal: Normal range of motion.         General: No  swelling or tenderness.   Skin:     General: Skin is warm and dry.      Findings: No erythema or rash.   Neurological:      General: No focal deficit present.      Mental Status: He is alert and oriented to person, place, and time.      Cranial Nerves: No cranial nerve deficit.      Motor: No weakness.   Psychiatric:         Mood and Affect: Mood normal.         Behavior: Behavior normal.     Results Review:  I have reviewed the labs, radiology results, and diagnostic studies.    Laboratory Data:   Results from last 7 days   Lab Units 01/12/21  0526 01/11/21 1421 01/09/21  0208   WBC 10*3/mm3 9.89 7.82 11.22*   HEMOGLOBIN g/dL 14.0 15.0 13.0   HEMATOCRIT % 41.8 42.9 37.3*   PLATELETS 10*3/mm3 229 245 208        Results from last 7 days   Lab Units 01/12/21  0526 01/11/21 1421 01/11/21  0428  01/09/21  0208 01/08/21  0046   SODIUM mmol/L 134* 137 136   < > 136 134*   POTASSIUM mmol/L 4.1 4.1 4.0   < > 3.7 5.0   CHLORIDE mmol/L 98 98 100   < > 98 98   CO2 mmol/L 22.0 26.0 23.0   < > 27.0 23.0   BUN mg/dL 18 18 20   < > 18 20   CREATININE mg/dL 0.75* 0.80 0.70*   < > 0.84 0.76   CALCIUM mg/dL 9.8 10.2 9.7   < > 9.3 10.1   BILIRUBIN mg/dL  --   --   --   --  0.6 0.5   ALK PHOS U/L  --   --   --   --  55 68   ALT (SGPT) U/L  --   --   --   --  15 17   AST (SGOT) U/L  --   --   --   --  39 29   GLUCOSE mg/dL 174* 159* 176*   < > 161* 230*    < > = values in this interval not displayed.       Radiology Data:   Imaging Results (Last 24 Hours)     Procedure Component Value Units Date/Time    US Carotid Bilateral [[id number]] Collected: 01/12/21 1524     Updated: 01/12/21 1530    Narrative:      History: Carotid occlusive disease       Impression:      Impression:  1. There is less than 50% stenosis of the right internal carotid artery.  2. There is 50-69% stenosis of the left internal carotid artery.  3. Antegrade flow is demonstrated in bilateral vertebral arteries.     Comments: Bilateral carotid vertebral arterial duplex  scan was  performed.     Grayscale imaging shows intimal thickening and calcified elements at the  carotid bifurcation. The right internal carotid artery peak systolic  velocity is 80.2 cm/sec. The end-diastolic velocity is 23.7 cm/sec. The  right ICA/CCA ratio is approximately 1.1 . These findings correlate with  less than 50% stenosis of the right internal carotid artery.     Grayscale imaging shows intimal thickening and calcified elements at the  carotid bifurcation. The left internal carotid artery peak systolic  velocity is 150.1 cm/sec. The end-diastolic velocity is 36.2 cm/sec. The  left ICA/CCA ratio is approximately 2.2 . These findings correlate with  50-69% stenosis of the left internal carotid artery.     Antegrade flow is demonstrated in bilateral vertebral arteries.  There is greater than 50% stenosis in the left external carotid artery.  This report was finalized on 01/12/2021 15:27 by Dr. Markus Carr MD.    XR Chest PA & Lateral [[id number]] Collected: 01/12/21 1141     Updated: 01/12/21 1145    Narrative:      EXAMINATION:   XR CHEST PA AND LATERAL-  1/12/2021 11:41 AM CST     HISTORY:  XR CHEST PA AND LATERAL- 1/12/2021 11:38 AM CST     HISTORY: hypoxia, cad; I21.4-Non-ST elevation (NSTEMI) myocardial  infarction; I50.9-Heart failure, unspecified       COMPARISON: None.     FINDINGS:   Upright frontal and lateral radiographs of the chest were obtained.     The lungs are clear. The cardiomediastinal silhouette and pulmonary  vascularity are within normal limits. The osseous structures and  surrounding soft tissues demonstrate no acute abnormality.       Impression:      1. No radiographic evidence of acute cardiopulmonary process.        This report was finalized on 01/12/2021 11:42 by Dr. Jair Clark MD.          I have reviewed the patient's current medications.     Assessment/Plan     Active Hospital Problems    Diagnosis   • **Acute systolic congestive heart failure (CMS/HCC)   • NSTEMI  (non-ST elevated myocardial infarction) (CMS/HCC)   • 3-vessel coronary artery disease   • Type 2 diabetes mellitus, without long-term current use of insulin (CMS/HCC)   • Hypertension   • Hyperlipidemia     Plan:  1.  Patient was transferred from [facility] on 1/7 for complaints of shortness of breath.  He was found to have a NSTEMI was transferred to our facility for higher level of care. He was also found to be hypoxic and tachypnic.  He was placed on a Vapotherm at 20 L at 50%. CTA of the chest negative for pulmonary embolus.  Concern for fluid overload with bilateral small pleural effusions.  ProBNP 1,608.  He was started on diruesis.  2.  Cardiology evaluating patient.  Echo on 1/8 shows ejection fraction of 30 to 40% with grade 1 diastolic dysfunction.  Lasix was transitioned to oral on 1/10.  Continue aspirin, beta-blocker, ACE-I and statin.  Cardiac catheterization on 1/11 showed severe three-vessel coronary disease.    3.  CT surgery evaluating patient.  Last dose of Plavix is on 1/11.  Preoperative work-up ongoing for CABG with plans for tentative date of 1/18/2021 during this hospital encounter.  Bilateral carotid ultrasound preliminary Rt <50 % and Lt 50-69% stenosis.   3.  A1c 7.1.  Last blood glucose 174, 189, 264.  Increase sliding scale insulin to moderate dose.  Metformin on hold.  4.  Work-up ongoing.     Discharge Planning: Patent undergoing pre operative work up for tentative CABG Monday 1/18/2021 during this hospital encounter.     Electronically signed by ANGEL Berry, 01/12/21, 15:33 CST.    I personally evaluated and examined the patient in conjunction with ANGEL Winters and agree with the assessment, treatment plan, and disposition of the patient as recorded by her. My history, exam, and further recommendations are:     Discussed with his nurse, Kary.    Discussed with Viky Bañuelos from the cardiothoracic surgery service.  Plans are for him to undergo his CABG on  Monday, 1/18.    He gives no new complaints today.  He has had no chest discomfort.  He feels well.  He is currently sitting up in bed watching the news and also reading [employer].     Electronically signed by Rolando Simental DO, 1/12/2021, 16:33 CST.

## 2021-01-12 NOTE — PROGRESS NOTES
"Patient name: Wesley Eduardo  Patient : 1945  VISIT # [id number]  MR #[id number]    Procedure:Procedure(s):  Left Heart Cath  Procedure Date:2021  POD:1 Day Post-Op    Subjective   Chief Complaint   Patient presents with   • Shortness of Breath     Resting in bed. On room air. No chest pain or shortness of breath. Episode of diarrhea this morning after breakfast. Preop testing ordered.     Telemetry: sinus 64-90, occasional PVC    ROS: no fevers or chills, no nausea or vomiting       Objective     Visit Vitals  /79 (BP Location: Left arm, Patient Position: Lying)   Pulse 77   Temp 97.2 °F (36.2 °C) (Oral)   Resp 16   Ht 175.3 cm (69\")   Wt 85.2 kg (187 lb 12.8 oz)   SpO2 97%   BMI 27.73 kg/m²       Intake/Output Summary (Last 24 hours) at 2021 1128  Last data filed at 2021 0700  Gross per 24 hour   Intake 240 ml   Output 2300 ml   Net -2060 ml       Lab:     CBC:  Results from last 7 days   Lab Units 21  0526 21  1421 21  0208   WBC 10*3/mm3 9.89 7.82 11.22*   HEMATOCRIT % 41.8 42.9 37.3*   PLATELETS 10*3/mm3 229 245 208          BMP:  Results from last 7 days   Lab Units 21  0526 21  1421 21  0428   SODIUM mmol/L 134* 137 136   POTASSIUM mmol/L 4.1 4.1 4.0   CHLORIDE mmol/L 98 98 100   CO2 mmol/L 22.0 26.0 23.0   GLUCOSE mg/dL 174* 159* 176*   BUN mg/dL 18 18 20   CREATININE mg/dL 0.75* 0.80 0.70*          COAG:  Results from last 7 days   Lab Units 21  0046   INR  1.03       IMAGES:       Imaging Results (Last 24 Hours)     Procedure Component Value Units Date/Time    US Carotid Bilateral [[id number]] Resulted: 21 110     Updated: 21            Physical Exam:  General: Alert, oriented. No apparent distress.   Cardiovascular: Regular rate and rhythm without murmur, rubs, or gallops.    Pulmonary: Clear to auscultation bilaterally without wheezing, rubs, or rales.  Abdomen: Soft, non distended, and non tender.  Extremities: Warm, " moves all extremities. No edema.   Neurologic:  Grossly intact with no focal deficits.            Impression:  Acute systolic congestive heart failure (CMS/HCC)  NSTEMI (non-ST elevated myocardial infarction) (CMS/HCC)  Acute pulmonary edema (CMS/HCC)  Diabetes mellitus (CMS/HCC)  Hypertension  Hyperlipidemia  Coronary artery disease, multivessel  Aspirin like platelet function defect, day 1 off plavix   Bilateral carotid artery stenosis, asymptomatic      Plan:  Bilateral carotid ultrasound ordered  PA and Lat CXR today, if pulmonary edema and effusions improved will order PFT  Continue to hold Plavix  Tentative CABG Monday 1/18/2021 during this hospital encounter  DW patient and nursing       Viky Bañuelos, APRN  01/12/21  11:28 CST

## 2021-01-12 NOTE — PLAN OF CARE
Goal Outcome Evaluation:  Plan of Care Reviewed With: patient  Progress: no change  Outcome Summary: VSS. No c/o pain. Cath site C/D/I. Discussed about pt. having CABG last night and pt. is hoping to be able to go home a few days to see his family before surgery. Also discussed the importance of using incentive spirometer and ambulation post surgery. NSR on tele. Will continue to monitor.

## 2021-01-13 ENCOUNTER — APPOINTMENT (OUTPATIENT)
Dept: PULMONOLOGY | Facility: HOSPITAL | Age: 76
End: 2021-01-13

## 2021-01-13 LAB
A-A DO2: 18.6 MMHG
ARTERIAL PATENCY WRIST A: POSITIVE
ATMOSPHERIC PRESS: 753 MMHG
BACTERIA SPEC AEROBE CULT: NORMAL
BACTERIA SPEC AEROBE CULT: NORMAL
BASE EXCESS BLDA CALC-SCNC: -0.9 MMOL/L (ref 0–2)
BDY SITE: ABNORMAL
BODY TEMPERATURE: 37 C
COHGB MFR BLD: 0.7 % (ref 0–5)
GLUCOSE BLDC GLUCOMTR-MCNC: 145 MG/DL (ref 70–130)
GLUCOSE BLDC GLUCOMTR-MCNC: 154 MG/DL (ref 70–130)
GLUCOSE BLDC GLUCOMTR-MCNC: 183 MG/DL (ref 70–130)
GLUCOSE BLDC GLUCOMTR-MCNC: 325 MG/DL (ref 70–130)
HCO3 BLDA-SCNC: 22 MMOL/L (ref 20–26)
HCT VFR BLD CALC: 45.4 % (ref 38–51)
HGB BLDA-MCNC: 14.8 G/DL (ref 14–18)
Lab: ABNORMAL
METHGB BLD QL: 0.5 % (ref 0–3)
MODALITY: ABNORMAL
NOTE: ABNORMAL
OXYHGB MFR BLDV: 96.4 % (ref 94–99)
PCO2 BLDA: 30.9 MM HG (ref 35–45)
PCO2 TEMP ADJ BLD: 30.9 MM HG (ref 35–45)
PH BLDA: 7.46 PH UNITS (ref 7.35–7.45)
PH, TEMP CORRECTED: 7.46 PH UNITS (ref 7.35–7.45)
PO2 BLDA: 94.8 MM HG (ref 83–108)
PO2 TEMP ADJ BLD: 94.8 MM HG (ref 83–108)
POTASSIUM BLDA-SCNC: 4.3 MMOL/L (ref 3.5–5.2)
SAO2 % BLDCOA: 97.6 % (ref 94–99)
SODIUM BLDA-SCNC: 130 MMOL/L (ref 136–145)
VENTILATOR MODE: ABNORMAL

## 2021-01-13 PROCEDURE — 82805 BLOOD GASES W/O2 SATURATION: CPT

## 2021-01-13 PROCEDURE — 99232 SBSQ HOSP IP/OBS MODERATE 35: CPT | Performed by: NURSE PRACTITIONER

## 2021-01-13 PROCEDURE — 94729 DIFFUSING CAPACITY: CPT | Performed by: INTERNAL MEDICINE

## 2021-01-13 PROCEDURE — 94060 EVALUATION OF WHEEZING: CPT | Performed by: INTERNAL MEDICINE

## 2021-01-13 PROCEDURE — 94726 PLETHYSMOGRAPHY LUNG VOLUMES: CPT | Performed by: INTERNAL MEDICINE

## 2021-01-13 PROCEDURE — 83050 HGB METHEMOGLOBIN QUAN: CPT

## 2021-01-13 PROCEDURE — 82962 GLUCOSE BLOOD TEST: CPT

## 2021-01-13 PROCEDURE — 82375 ASSAY CARBOXYHB QUANT: CPT

## 2021-01-13 PROCEDURE — 99231 SBSQ HOSP IP/OBS SF/LOW 25: CPT | Performed by: NURSE PRACTITIONER

## 2021-01-13 PROCEDURE — 94729 DIFFUSING CAPACITY: CPT

## 2021-01-13 PROCEDURE — 36600 WITHDRAWAL OF ARTERIAL BLOOD: CPT

## 2021-01-13 PROCEDURE — 63710000001 INSULIN LISPRO (HUMAN) PER 5 UNITS: Performed by: NURSE PRACTITIONER

## 2021-01-13 PROCEDURE — 25010000002 ENOXAPARIN PER 10 MG: Performed by: THORACIC SURGERY (CARDIOTHORACIC VASCULAR SURGERY)

## 2021-01-13 PROCEDURE — 94060 EVALUATION OF WHEEZING: CPT

## 2021-01-13 PROCEDURE — 94726 PLETHYSMOGRAPHY LUNG VOLUMES: CPT

## 2021-01-13 RX ORDER — ALBUTEROL SULFATE 2.5 MG/3ML
2.5 SOLUTION RESPIRATORY (INHALATION) ONCE
Status: COMPLETED | OUTPATIENT
Start: 2021-01-13 | End: 2021-01-13

## 2021-01-13 RX ADMIN — ASPIRIN 81 MG: 81 TABLET, CHEWABLE ORAL at 08:43

## 2021-01-13 RX ADMIN — ENOXAPARIN SODIUM 90 MG: 100 INJECTION SUBCUTANEOUS at 08:43

## 2021-01-13 RX ADMIN — FAMOTIDINE 20 MG: 20 TABLET, FILM COATED ORAL at 08:43

## 2021-01-13 RX ADMIN — ALBUTEROL SULFATE 2.5 MG: 2.5 SOLUTION RESPIRATORY (INHALATION) at 09:37

## 2021-01-13 RX ADMIN — AMITRIPTYLINE HYDROCHLORIDE 100 MG: 25 TABLET, FILM COATED ORAL at 22:04

## 2021-01-13 RX ADMIN — CARVEDILOL 6.25 MG: 6.25 TABLET, FILM COATED ORAL at 17:10

## 2021-01-13 RX ADMIN — LISINOPRIL 5 MG: 5 TABLET ORAL at 08:43

## 2021-01-13 RX ADMIN — SODIUM CHLORIDE, PRESERVATIVE FREE 10 ML: 5 INJECTION INTRAVENOUS at 22:05

## 2021-01-13 RX ADMIN — CARVEDILOL 6.25 MG: 6.25 TABLET, FILM COATED ORAL at 08:43

## 2021-01-13 RX ADMIN — GABAPENTIN 600 MG: 300 CAPSULE ORAL at 06:10

## 2021-01-13 RX ADMIN — POTASSIUM CHLORIDE 20 MEQ: 750 CAPSULE, EXTENDED RELEASE ORAL at 08:43

## 2021-01-13 RX ADMIN — TAMSULOSIN HYDROCHLORIDE 0.4 MG: 0.4 CAPSULE ORAL at 08:43

## 2021-01-13 RX ADMIN — INDAPAMIDE 1.25 MG: 1.25 TABLET, FILM COATED ORAL at 06:10

## 2021-01-13 RX ADMIN — AMIODARONE HYDROCHLORIDE 400 MG: 200 TABLET ORAL at 17:10

## 2021-01-13 RX ADMIN — ATORVASTATIN CALCIUM 20 MG: 10 TABLET, FILM COATED ORAL at 08:43

## 2021-01-13 RX ADMIN — INSULIN LISPRO 2 UNITS: 100 INJECTION, SOLUTION INTRAVENOUS; SUBCUTANEOUS at 08:49

## 2021-01-13 RX ADMIN — GABAPENTIN 600 MG: 300 CAPSULE ORAL at 22:04

## 2021-01-13 RX ADMIN — AMIODARONE HYDROCHLORIDE 400 MG: 200 TABLET ORAL at 08:42

## 2021-01-13 RX ADMIN — INSULIN LISPRO 7 UNITS: 100 INJECTION, SOLUTION INTRAVENOUS; SUBCUTANEOUS at 11:57

## 2021-01-13 RX ADMIN — SODIUM CHLORIDE, PRESERVATIVE FREE 10 ML: 5 INJECTION INTRAVENOUS at 08:44

## 2021-01-13 RX ADMIN — FUROSEMIDE 40 MG: 40 TABLET ORAL at 08:43

## 2021-01-13 RX ADMIN — ENOXAPARIN SODIUM 90 MG: 100 INJECTION SUBCUTANEOUS at 22:04

## 2021-01-13 RX ADMIN — GABAPENTIN 600 MG: 300 CAPSULE ORAL at 14:06

## 2021-01-13 NOTE — PLAN OF CARE
Goal Outcome Evaluation:  Plan of Care Reviewed With: patient  Progress: no change  Outcome Summary: VSS. No c/o pain. Continues CABG workup, plans for PFTs today. CABG tentatively scheduled for Monday 1/18.

## 2021-01-13 NOTE — PROGRESS NOTES
[facility] -  Progress Note     LOS: 5 days   Patient Care Team:  Alfredito Land MD as PCP - General (Family Medicine)    Chief Complaint: Shortness of breath on admission    Subjective     Interval History:     Patient Complaints: The patient reports he is feeling well overall.  He denies any chest pain.  His shortness of breath has resolved.  He states he feels better today than he has in a long time.  He is maintaining normal sinus rhythm with heart rates in the 60s to 80s.  Blood pressures well controlled.        Review of Systems:     Review of Systems   Constitutional: Negative for diaphoresis, fatigue, fever and unexpected weight change.   HENT: Negative for nosebleeds.    Respiratory: Negative for apnea, cough, chest tightness, shortness of breath and wheezing.    Cardiovascular: Negative for chest pain, palpitations and leg swelling.   Gastrointestinal: Negative for abdominal distention, nausea and vomiting.   Genitourinary: Negative for hematuria.   Musculoskeletal: Negative for gait problem.   Skin: Negative for color change.   Neurological: Negative for dizziness, syncope, weakness and light-headedness.     Objective     Vital Sign Min/Max for last 24 hours  Temp  Min: 97.2 °F (36.2 °C)  Max: 98.3 °F (36.8 °C)   BP  Min: 100/54  Max: 132/67   Pulse  Min: 72  Max: 82   Resp  Min: 16  Max: 18   SpO2  Min: 95 %  Max: 100 %   No data recorded   Weight  Min: 84.9 kg (187 lb 3.2 oz)  Max: 84.9 kg (187 lb 3.2 oz)         01/12/21 1929   Weight: 84.9 kg (187 lb 3.2 oz)       Physical Exam:    Constitutional:       General: Not in acute distress.     Appearance: Well-developed. Not diaphoretic.   Eyes:      Pupils: Pupils are equal, round, and reactive to light.   HENT:      Head: Normocephalic and atraumatic.   Neck:      Musculoskeletal: Normal range of motion and neck supple.      Vascular: No JVD.   Pulmonary:      Effort: Pulmonary effort is normal. No respiratory distress.       Breath sounds: Normal breath sounds.   Chest:      Chest wall: Not tender to palpatation.   Cardiovascular:      Normal rate. Regular rhythm.      Murmurs: There is no murmur.   Edema:     Peripheral edema absent.   Abdominal:      General: Bowel sounds are normal. There is no distension.      Palpations: Abdomen is soft.      Tenderness: There is no abdominal tenderness.   Musculoskeletal: Normal range of motion.   Skin:     General: Skin is warm and dry.   Neurological:      Mental Status: Alert and oriented to person, place, and time.      Cranial Nerves: No cranial nerve deficit.   Psychiatric:         Behavior: Behavior normal.       Results Review:   Lab Results (last 72 hours)     Procedure Component Value Units Date/Time    POC Glucose Once [[id number]]  (Abnormal) Collected: 01/13/21 1103    Specimen: Blood Updated: 01/13/21 1120     Glucose 325 mg/dL      Comment: : [id number] Irma FernandeziaMeter ID: [id number]       Blood Gas, Arterial With Co-Ox [[id number]]  (Abnormal) Collected: 01/13/21 0906    Specimen: Arterial Blood Updated: 01/13/21 0909     Site Left Radial     Reddy's Test Positive     pH, Arterial 7.460 pH units      Comment: 83 Value above reference range        pCO2, Arterial 30.9 mm Hg      Comment: 84 Value below reference range        pO2, Arterial 94.8 mm Hg      HCO3, Arterial 22.0 mmol/L      Base Excess, Arterial -0.9 mmol/L      Comment: 84 Value below reference range        O2 Saturation, Arterial 97.6 %      Hemoglobin, Blood Gas 14.8 g/dL      Hematocrit, Blood Gas 45.4 %      Oxyhemoglobin 96.4 %      Methemoglobin 0.50 %      Carboxyhemoglobin 0.7 %      A-a Gradiant 18.6 mmHg      Temperature 37.0 C      Sodium, Arterial 130 mmol/L      Comment: 84 Value below reference range        Potassium, Arterial 4.3 mmol/L      Barometric Pressure for Blood Gas 753 mmHg      Modality Room Air     Ventilator Mode NA     Note --     Collected by [id number]     Comment: Meter:  [id number]     :  [id number]        pH, Temp Corrected 7.460 pH Units      pCO2, Temperature Corrected 30.9 mm Hg      pO2, Temperature Corrected 94.8 mm Hg     POC Glucose Once [[id number]]  (Abnormal) Collected: 01/13/21 0727    Specimen: Blood Updated: 01/13/21 0743     Glucose 183 mg/dL      Comment: : [id number] Irma MikiaMeter ID: [id number]       Blood Culture - Blood, Arm, Left [[id number]] Collected: 01/08/21 0046    Specimen: Blood from Arm, Left Updated: 01/13/21 0100     Blood Culture No growth at 5 days    Blood Culture - Blood, Arm, Right [[id number]] Collected: 01/08/21 0046    Specimen: Blood from Arm, Right Updated: 01/13/21 0100     Blood Culture No growth at 5 days    COVID PRE-OP / PRE-PROCEDURE SCREENING ORDER (NO ISOLATION) - Swab, Nasal Cavity [[id number]]  (Normal) Collected: 01/12/21 1552    Specimen: Swab from Nasal Cavity Updated: 01/12/21 1730    Narrative:      The following orders were created for panel order COVID PRE-OP / PRE-PROCEDURE SCREENING ORDER (NO ISOLATION) - Swab, Nasal Cavity.  Procedure                               Abnormality         Status                     ---------                               -----------         ------                     COVID-19,[facility]...[[id number]]  Normal              Final result                 Please view results for these tests on the individual orders.    COVID-19,[facility] IN-HOUSE,Nasal Swab No Transport Media 3-4 HR TAT - Swab, Nasal Cavity [[id number]]  (Normal) Collected: 01/12/21 1552    Specimen: Swab from Nasal Cavity Updated: 01/12/21 1730     COVID19 Not Detected    Narrative:      Fact sheet for providers: https://www.fda.gov/media/600822/download     Fact sheet for patients: https://www.fda.gov/media/757827/download    Test performed by PCR.    POC Glucose Once [[id number]]  (Abnormal) Collected: 01/12/21 1621    Specimen: Blood Updated: 01/12/21 1644     Glucose 146 mg/dL      Comment: : [id number]  Jason HeatherMeter ID: [id number]       POC Glucose Once [[id number]]  (Abnormal) Collected: 01/12/21 1041    Specimen: Blood Updated: 01/12/21 1103     Glucose 264 mg/dL      Comment: : [id number] Irma MikiaMeter ID: [id number]       POC Glucose Once [[id number]]  (Abnormal) Collected: 01/12/21 0811    Specimen: Blood Updated: 01/12/21 0827     Glucose 189 mg/dL      Comment: : [id number] Irma MikiaMeter ID: [id number]       Basic Metabolic Panel [[id number]]  (Abnormal) Collected: 01/12/21 0526    Specimen: Blood Updated: 01/12/21 0630     Glucose 174 mg/dL      BUN 18 mg/dL      Creatinine 0.75 mg/dL      Sodium 134 mmol/L      Potassium 4.1 mmol/L      Comment: Slight hemolysis detected by analyzer. Results may be affected.        Chloride 98 mmol/L      CO2 22.0 mmol/L      Calcium 9.8 mg/dL      eGFR Non African Amer 102 mL/min/1.73      BUN/Creatinine Ratio 24.0     Anion Gap 14.0 mmol/L     Narrative:      GFR Normal >60  Chronic Kidney Disease <60  Kidney Failure <15      CBC (No Diff) [[id number]]  (Normal) Collected: 01/12/21 0526    Specimen: Blood Updated: 01/12/21 0604     WBC 9.89 10*3/mm3      RBC 4.95 10*6/mm3      Hemoglobin 14.0 g/dL      Hematocrit 41.8 %      MCV 84.4 fL      MCH 28.3 pg      MCHC 33.5 g/dL      RDW 12.6 %      RDW-SD 38.4 fl      MPV 11.1 fL      Platelets 229 10*3/mm3     POC Glucose Once [[id number]]  (Abnormal) Collected: 01/11/21 1952    Specimen: Blood Updated: 01/11/21 2022     Glucose 206 mg/dL      Comment: : [id number] Tyler CohenferMeter ID: [id number]       POC Glucose Once [[id number]]  (Abnormal) Collected: 01/11/21 1654    Specimen: Blood Updated: 01/11/21 1706     Glucose 191 mg/dL      Comment: : [id number] Jerardo HorowitzaisaMeter ID: [id number]       Basic Metabolic Panel [[id number]]  (Abnormal) Collected: 01/11/21 1421    Specimen: Blood Updated: 01/11/21 1501     Glucose 159 mg/dL      BUN 18 mg/dL      Creatinine 0.80 mg/dL      Sodium 137 mmol/L       Potassium 4.1 mmol/L      Chloride 98 mmol/L      CO2 26.0 mmol/L      Calcium 10.2 mg/dL      eGFR Non African Amer 95 mL/min/1.73      BUN/Creatinine Ratio 22.5     Anion Gap 13.0 mmol/L     Narrative:      GFR Normal >60  Chronic Kidney Disease <60  Kidney Failure <15      CBC & Differential [[id number]]  (Abnormal) Collected: 01/11/21 1421    Specimen: Blood Updated: 01/11/21 1442    Narrative:      The following orders were created for panel order CBC & Differential.  Procedure                               Abnormality         Status                     ---------                               -----------         ------                     CBC Auto Differential[[id number]]        Abnormal            Final result                 Please view results for these tests on the individual orders.    CBC Auto Differential [[id number]]  (Abnormal) Collected: 01/11/21 1421    Specimen: Blood Updated: 01/11/21 1442     WBC 7.82 10*3/mm3      RBC 5.04 10*6/mm3      Hemoglobin 15.0 g/dL      Hematocrit 42.9 %      MCV 85.1 fL      MCH 29.8 pg      MCHC 35.0 g/dL      RDW 12.8 %      RDW-SD 39.6 fl      MPV 10.5 fL      Platelets 245 10*3/mm3      Neutrophil % 56.5 %      Lymphocyte % 25.2 %      Monocyte % 17.4 %      Eosinophil % 0.1 %      Basophil % 0.5 %      Immature Grans % 0.3 %      Neutrophils, Absolute 4.42 10*3/mm3      Lymphocytes, Absolute 1.97 10*3/mm3      Monocytes, Absolute 1.36 10*3/mm3      Eosinophils, Absolute 0.01 10*3/mm3      Basophils, Absolute 0.04 10*3/mm3      Immature Grans, Absolute 0.02 10*3/mm3      nRBC 0.0 /100 WBC     Magnesium [[id number]]  (Normal) Collected: 01/11/21 0428    Specimen: Blood Updated: 01/11/21 0825     Magnesium 1.9 mg/dL     POC Glucose Once [[id number]]  (Abnormal) Collected: 01/11/21 0715    Specimen: Blood Updated: 01/11/21 0729     Glucose 224 mg/dL      Comment: : [id number] Sly LadCannon Falls Hospital and ClinicaMeter ID: [id number]       Basic Metabolic Panel [[id number]]  (Abnormal)  Collected: 01/11/21 0428    Specimen: Blood Updated: 01/11/21 0539     Glucose 176 mg/dL      BUN 20 mg/dL      Creatinine 0.70 mg/dL      Sodium 136 mmol/L      Potassium 4.0 mmol/L      Chloride 100 mmol/L      CO2 23.0 mmol/L      Calcium 9.7 mg/dL      eGFR Non African Amer 111 mL/min/1.73      BUN/Creatinine Ratio 28.6     Anion Gap 13.0 mmol/L     Narrative:      GFR Normal >60  Chronic Kidney Disease <60  Kidney Failure <15      POC Glucose Once [[id number]]  (Abnormal) Collected: 01/10/21 2021    Specimen: Blood Updated: 01/10/21 2039     Glucose 181 mg/dL      Comment: : [id number] Lucinda IveyitaMeter ID: [id number]       POC Glucose Once [[id number]]  (Normal) Collected: 01/10/21 1550    Specimen: Blood Updated: 01/10/21 1601     Glucose 121 mg/dL      Comment: : [id number] KIZZYandrea FitollianMeter ID: [id number]                 Echo EF Estimated  No results found for: ECHOEFEST      Cath Ejection Fraction Quantitative  No results found for: CATHEF        Medication Review: yes  Current Facility-Administered Medications   Medication Dose Route Frequency Provider Last Rate Last Admin   • acetaminophen (TYLENOL) tablet 650 mg  650 mg Oral Q4H PRN Cade Hong MD       • acetaminophen (TYLENOL) tablet 650 mg  650 mg Oral Q4H PRN Cade Hong MD       • amiodarone (PACERONE) tablet 400 mg  400 mg Oral BID With Meals Reji Wood MD   400 mg at 01/13/21 0842   • amitriptyline (ELAVIL) tablet 100 mg  100 mg Oral Nightly Cade Hong MD   100 mg at 01/12/21 2118   • aspirin chewable tablet 81 mg  81 mg Oral Daily Cade Hong MD   81 mg at 01/13/21 0843   • atorvastatin (LIPITOR) tablet 20 mg  20 mg Oral Daily Reji Wood MD   20 mg at 01/13/21 0843   • carvedilol (COREG) tablet 6.25 mg  6.25 mg Oral BID With Meals Cade Hong MD   6.25 mg at 01/13/21 0843   • dextrose (D50W) 25 g/ 50mL Intravenous Solution 25 g  25 g Intravenous Q15 Min JULION Cade Hong MD       • dextrose  (GLUTOSE) oral gel 15 g  15 g Oral Q15 Min PRN Cade Hong MD       • enoxaparin (LOVENOX) syringe 90 mg  1 mg/kg Subcutaneous Q12H Reji Wood MD   90 mg at 01/13/21 0843   • famotidine (PEPCID) tablet 20 mg  20 mg Oral Daily Cade Hong MD   20 mg at 01/13/21 0843   • furosemide (LASIX) tablet 40 mg  40 mg Oral Daily Cade Hong MD   40 mg at 01/13/21 0843   • gabapentin (NEURONTIN) capsule 600 mg  600 mg Oral Q8H Cade Hong MD   600 mg at 01/13/21 0610   • glucagon (human recombinant) (GLUCAGEN DIAGNOSTIC) injection 1 mg  1 mg Subcutaneous Q15 Min PRN Cade Hong MD       • indapamide (LOZOL) tablet 1.25 mg  1.25 mg Oral Q AM Cade Hong MD   1.25 mg at 01/13/21 0610   • insulin lispro (humaLOG, ADMELOG) injection 0-9 Units  0-9 Units Subcutaneous TID AC Jeannine Yanez, APRN   2 Units at 01/13/21 0849   • lisinopril (PRINIVIL,ZESTRIL) tablet 5 mg  5 mg Oral Q24H Cade Hong MD   5 mg at 01/13/21 0843   • ondansetron (ZOFRAN) injection 4 mg  4 mg Intravenous Q6H PRN Cade Hong MD       • potassium chloride (MICRO-K) CR capsule 20 mEq  20 mEq Oral Daily Cade Hong MD   20 mEq at 01/13/21 0843   • sodium chloride 0.9 % flush 10 mL  10 mL Intravenous PRN Cade Hong MD       • sodium chloride 0.9 % flush 10 mL  10 mL Intravenous Q12H Cade Hong MD   10 mL at 01/13/21 0844   • sodium chloride 0.9 % flush 10 mL  10 mL Intravenous PRN Codi Hester PA-C       • sodium chloride 0.9 % infusion  1-3 mL/kg/hr Intravenous Continuous Codi Hester PA-C 100 mL/hr at 01/11/21 1435 1.149 mL/kg/hr at 01/11/21 1435   • tamsulosin (FLOMAX) 24 hr capsule 0.4 mg  0.4 mg Oral Daily Cade Hong MD   0.4 mg at 01/13/21 0843   • traMADol-acetaminophen (ULTRACET) 37.5-325 MG per tablet 1 tablet  1 tablet Oral BID PRN Cade Hong MD   1 tablet at 01/08/21 3674         Assessment/Plan     -Acute systolic congestive heart failure/ischemic  cardiomyopathy; now euvolemic on exam  -Non-STEMI  -Coronary artery disease with severe three-vessel CAD  -Diabetes mellitus type 2  -Carotid artery disease  -Essential hypertension  -Mixed hyperlipidemia    Plan-  Continue current medical therapy including aspirin, atorvastatin, Coreg, oral furosemide and lisinopril.  Repeat BMP in a.m.  Monitor volume status closely  Oral amiodarone initiated per CT surgery  Plans for CABG on Monday, 1/18/2021, after Plavix is out of his system.  Given his non-STEMI and severity of CAD, he will remain hospitalized until he has CABG.  Monitor telemetry.  PFTs completed today.    Jossie Lugo, APRN  01/13/21  11:21 CST

## 2021-01-13 NOTE — PROGRESS NOTES
"    [facility] Medicine Services  INPATIENT PROGRESS NOTE    Patient Name: Wesley Eduardo  Date of Admission: 1/7/2021  Today's Date: 01/13/21  Length of Stay: 5  Primary Care Physician: Alfredito Land MD    Subjective   Chief Complaint: Dyspnea on admission  HPI   Patient reports he is feeling well overall.  States the PFTs this morning \"was rough.\"  He remains on room air.  He was sitting in the chair watching TV with his wife next to him.  He denies any acute complaints.  Blood pressure well controlled and maintaining normal sinus rhythm.  His wife is planning on seeing him early Monday morning prior to surgery.    Review of Systems   All pertinent negatives and positives are as above. All other systems have been reviewed and are negative unless otherwise stated.     Objective    Temp:  [97.2 °F (36.2 °C)-98.3 °F (36.8 °C)] 97.9 °F (36.6 °C)  Heart Rate:  [72-82] 77  Resp:  [16-18] 16  BP: (100-132)/(54-69) 106/59  Physical Exam  Vitals signs reviewed.   Constitutional:       General: He is not in acute distress.     Appearance: He is not toxic-appearing.      Comments: Sitting up in bed. Tolerating room air. Wife at bedside.  HENT:      Head: Normocephalic and atraumatic.      Mouth/Throat:      Mouth: Mucous membranes are moist.      Pharynx: Oropharynx is clear.   Eyes:      Extraocular Movements: Extraocular movements intact.      Conjunctiva/sclera: Conjunctivae normal.      Pupils: Pupils are equal, round, and reactive to light.   Neck:      Musculoskeletal: Normal range of motion and neck supple. No muscular tenderness.   Cardiovascular:      Rate and Rhythm: Normal rate and regular rhythm.      Pulses: Normal pulses.      Heart sounds: No murmur.      Comments: S 60-80s  Pulmonary:      Effort: Pulmonary effort is normal.      Breath sounds: Normal breath sounds. No wheezing.   Abdominal:      General: Bowel sounds are normal. There is no distension. "      Palpations: Abdomen is soft.      Tenderness: There is no abdominal tenderness.   Musculoskeletal: Normal range of motion.         General: No swelling or tenderness.   Skin:     General: Skin is warm and dry.      Findings: No erythema or rash.   Neurological:      General: No focal deficit present.      Mental Status: He is alert and oriented to person, place, and time.      Cranial Nerves: No cranial nerve deficit.      Motor: No weakness.   Psychiatric:         Mood and Affect: Mood normal.         Behavior: Behavior normal.     Results Review:  I have reviewed the labs, radiology results, and diagnostic studies.    Laboratory Data:   Results from last 7 days   Lab Units 01/12/21  0526 01/11/21  1421 01/09/21  0208   WBC 10*3/mm3 9.89 7.82 11.22*   HEMOGLOBIN g/dL 14.0 15.0 13.0   HEMATOCRIT % 41.8 42.9 37.3*   PLATELETS 10*3/mm3 229 245 208        Results from last 7 days   Lab Units 01/13/21  0906 01/12/21  0526 01/11/21  1421 01/11/21  0428  01/09/21  0208 01/08/21  0046   SODIUM mmol/L  --  134* 137 136   < > 136 134*   SODIUM, ARTERIAL mmol/L 130*  --   --   --   --   --   --    POTASSIUM mmol/L  --  4.1 4.1 4.0   < > 3.7 5.0   CHLORIDE mmol/L  --  98 98 100   < > 98 98   CO2 mmol/L  --  22.0 26.0 23.0   < > 27.0 23.0   BUN mg/dL  --  18 18 20   < > 18 20   CREATININE mg/dL  --  0.75* 0.80 0.70*   < > 0.84 0.76   CALCIUM mg/dL  --  9.8 10.2 9.7   < > 9.3 10.1   BILIRUBIN mg/dL  --   --   --   --   --  0.6 0.5   ALK PHOS U/L  --   --   --   --   --  55 68   ALT (SGPT) U/L  --   --   --   --   --  15 17   AST (SGOT) U/L  --   --   --   --   --  39 29   GLUCOSE mg/dL  --  174* 159* 176*   < > 161* 230*    < > = values in this interval not displayed.     Radiology Data:   Imaging Results (Last 24 Hours)     Procedure Component Value Units Date/Time    US Carotid Bilateral [[id number]] Collected: 01/12/21 1524     Updated: 01/12/21 1530    Narrative:      History: Carotid occlusive disease       Impression:       Impression:  1. There is less than 50% stenosis of the right internal carotid artery.  2. There is 50-69% stenosis of the left internal carotid artery.  3. Antegrade flow is demonstrated in bilateral vertebral arteries.     Comments: Bilateral carotid vertebral arterial duplex scan was  performed.     Grayscale imaging shows intimal thickening and calcified elements at the  carotid bifurcation. The right internal carotid artery peak systolic  velocity is 80.2 cm/sec. The end-diastolic velocity is 23.7 cm/sec. The  right ICA/CCA ratio is approximately 1.1 . These findings correlate with  less than 50% stenosis of the right internal carotid artery.     Grayscale imaging shows intimal thickening and calcified elements at the  carotid bifurcation. The left internal carotid artery peak systolic  velocity is 150.1 cm/sec. The end-diastolic velocity is 36.2 cm/sec. The  left ICA/CCA ratio is approximately 2.2 . These findings correlate with  50-69% stenosis of the left internal carotid artery.     Antegrade flow is demonstrated in bilateral vertebral arteries.  There is greater than 50% stenosis in the left external carotid artery.  This report was finalized on 01/12/2021 15:27 by Dr. Markus Carr MD.          I have reviewed the patient's current medications.     Assessment/Plan     Active Hospital Problems    Diagnosis   • **Acute systolic congestive heart failure (CMS/HCC)   • 3-vessel coronary artery disease   • NSTEMI (non-ST elevated myocardial infarction) (CMS/HCC)   • Type 2 diabetes mellitus, without long-term current use of insulin (CMS/HCC)   • Hypertension   • Hyperlipidemia     Plan:  1.  Patient was transferred from [facility] on 1/7 for complaints of shortness of breath.  He was found to have a NSTEMI was transferred to our facility for higher level of care. He was also found to be hypoxic and tachypnic.  He was placed on a Vapotherm at 20 L at 50%. CTA of the chest negative for  pulmonary embolus.  Concern for fluid overload with bilateral small pleural effusions.  ProBNP 1,608.  He was started on diruesis.  2.  Cardiology evaluating patient.  Echo on 1/8 shows ejection fraction of 30 to 40% with grade 1 diastolic dysfunction.  Lasix was transitioned to oral on 1/10.  Continue aspirin, beta-blocker, ACE-I and statin.  Cardiac catheterization on 1/11 showed severe three-vessel coronary disease.    3.  CT surgery evaluating patient.  Last dose of Plavix is on 1/11.  Oral amiodarone initiated per CT surgery.  Preoperative work-up ongoing for CABG with plans for tentative date of 1/18/2021 during this hospital encounter.  PFTs today.  3.  A1c 7.1.  Moderate dose sliding scale insulin.  Metformin on hold.  4.  Work-up ongoing.     Discharge Planning: Patent undergoing pre operative work up for tentative CABG Monday 1/18/2021 during this hospital encounter.        Electronically signed by ANGEL Berry, 01/13/21, 12:55 CST.    I personally evaluated and examined the patient in conjunction with ANGEL Winters and agree with the assessment, treatment plan, and disposition of the patient as recorded by her. My history, exam, and further recommendations are:     Up in the chair.  Watching the impeachment hearings.  His wife is present with him.  This is my first time meeting her.  She had questions about being able to come and see him on the morning of surgery next week.  I had the charge nurse, Nona, speak with her about logistics.    He is feeling well.  He is preparing himself for what to expect next week.  We talked about the typical postoperative course.    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    Electronically signed by Rolando Simental DO, 1/13/2021, 15:11 CST.

## 2021-01-13 NOTE — PROGRESS NOTES
"Patient name: Wesley Eduardo  Patient : 1945  VISIT # [id number]  MR #[id number]    Procedure:Procedure(s):  Left Heart Cath  Procedure Date:2021  POD:2 Day Post-Op    Subjective   Chief Complaint   Patient presents with   • Shortness of Breath     Up in the bathroom shaving his face. Says he feels well. No further diarrhea. No chest pain or shortness of breath. For PFT later today.       Telemetry: sinus 64-90, occasional PVC    ROS: no fevers or chills       Objective     Visit Vitals  /54 (BP Location: Left arm, Patient Position: Lying)   Pulse 79   Temp 97.2 °F (36.2 °C) (Oral)   Resp 16   Ht 175.3 cm (69\")   Wt 84.9 kg (187 lb 3.2 oz)   SpO2 95%   BMI 27.64 kg/m²       Intake/Output Summary (Last 24 hours) at 2021 1111  Last data filed at 2021 0900  Gross per 24 hour   Intake 720 ml   Output 1725 ml   Net -1005 ml       Lab:     CBC:  Results from last 7 days   Lab Units 21  0526 21  1421 21  0208   WBC 10*3/mm3 9.89 7.82 11.22*   HEMATOCRIT % 41.8 42.9 37.3*   PLATELETS 10*3/mm3 229 245 208          BMP:  Results from last 7 days   Lab Units 21  0906 21  0526 21  1421 21  0428   SODIUM mmol/L  --  134* 137 136   SODIUM, ARTERIAL mmol/L 130*  --   --   --    POTASSIUM mmol/L  --  4.1 4.1 4.0   CHLORIDE mmol/L  --  98 98 100   CO2 mmol/L  --  22.0 26.0 23.0   GLUCOSE mg/dL  --  174* 159* 176*   BUN mg/dL  --  18 18 20   CREATININE mg/dL  --  0.75* 0.80 0.70*          COAG:  Results from last 7 days   Lab Units 21  0046   INR  1.03       IMAGES:       Imaging Results (Last 24 Hours)     Procedure Component Value Units Date/Time    US Carotid Bilateral [[id number]] Collected: 21 1524     Updated: 21 1530    Narrative:      History: Carotid occlusive disease       Impression:      Impression:  1. There is less than 50% stenosis of the right internal carotid artery.  2. There is 50-69% stenosis of the left internal carotid " artery.  3. Antegrade flow is demonstrated in bilateral vertebral arteries.     Comments: Bilateral carotid vertebral arterial duplex scan was  performed.     Grayscale imaging shows intimal thickening and calcified elements at the  carotid bifurcation. The right internal carotid artery peak systolic  velocity is 80.2 cm/sec. The end-diastolic velocity is 23.7 cm/sec. The  right ICA/CCA ratio is approximately 1.1 . These findings correlate with  less than 50% stenosis of the right internal carotid artery.     Grayscale imaging shows intimal thickening and calcified elements at the  carotid bifurcation. The left internal carotid artery peak systolic  velocity is 150.1 cm/sec. The end-diastolic velocity is 36.2 cm/sec. The  left ICA/CCA ratio is approximately 2.2 . These findings correlate with  50-69% stenosis of the left internal carotid artery.     Antegrade flow is demonstrated in bilateral vertebral arteries.  There is greater than 50% stenosis in the left external carotid artery.  This report was finalized on 01/12/2021 15:27 by Dr. Markus Carr MD.    XR Chest PA & Lateral [[id number]] Collected: 01/12/21 1141     Updated: 01/12/21 1145    Narrative:      EXAMINATION:   XR CHEST PA AND LATERAL-  1/12/2021 11:41 AM CST     HISTORY:  XR CHEST PA AND LATERAL- 1/12/2021 11:38 AM CST     HISTORY: hypoxia, cad; I21.4-Non-ST elevation (NSTEMI) myocardial  infarction; I50.9-Heart failure, unspecified       COMPARISON: None.     FINDINGS:   Upright frontal and lateral radiographs of the chest were obtained.     The lungs are clear. The cardiomediastinal silhouette and pulmonary  vascularity are within normal limits. The osseous structures and  surrounding soft tissues demonstrate no acute abnormality.       Impression:      1. No radiographic evidence of acute cardiopulmonary process.        This report was finalized on 01/12/2021 11:42 by Dr. Jair Clark MD.            Physical Exam:  General: Alert, oriented.  No apparent distress.   Cardiovascular: Regular rate and rhythm without murmur, rubs, or gallops.    Pulmonary: Clear to auscultation bilaterally without wheezing, rubs, or rales.  Abdomen: Soft, non distended, and non tender.  Extremities: Warm, moves all extremities. No edema.   Neurologic:  Grossly intact with no focal deficits.            Impression:  Acute systolic congestive heart failure (CMS/HCC)  NSTEMI (non-ST elevated myocardial infarction) (CMS/HCC)  Acute pulmonary edema (CMS/HCC)  Diabetes mellitus (CMS/HCC)  Hypertension  Hyperlipidemia  Coronary artery disease, multivessel  Aspirin like platelet function defect, day 2 off plavix   Bilateral carotid artery stenosis, asymptomatic      Plan:  Full PFT today   Continue to hold Plavix  Likely repeat echo closer to time of surgery to reassess LV function  Tentative CABG Monday 1/18/2021 during this hospital encounter  DW patient and nursing       Viky Bañuelos, APRN  01/13/21  11:11 CST

## 2021-01-13 NOTE — PROGRESS NOTES
Continued Stay Note   [facility]     Patient Name: Wesley Eduardo  MRN: [id number]  Today's Date: 1/13/2021    Admit Date: 1/7/2021    Discharge Plan     Row Name 01/13/21 0817       Plan    Plan  Home    Patient/Family in Agreement with Plan  yes    Plan Comments  Chart reviewed; events noted.  Pt schedule for CABG on 1/18.  SW will continue to follow.        Discharge Codes    No documentation.             RUBIN Jurado

## 2021-01-14 LAB
ANION GAP SERPL CALCULATED.3IONS-SCNC: 11 MMOL/L (ref 5–15)
BUN SERPL-MCNC: 20 MG/DL (ref 8–23)
BUN/CREAT SERPL: 24.7 (ref 7–25)
CALCIUM SPEC-SCNC: 9.7 MG/DL (ref 8.6–10.5)
CHLORIDE SERPL-SCNC: 96 MMOL/L (ref 98–107)
CO2 SERPL-SCNC: 26 MMOL/L (ref 22–29)
CREAT SERPL-MCNC: 0.81 MG/DL (ref 0.76–1.27)
GFR SERPL CREATININE-BSD FRML MDRD: 93 ML/MIN/1.73
GLUCOSE BLDC GLUCOMTR-MCNC: 172 MG/DL (ref 70–130)
GLUCOSE BLDC GLUCOMTR-MCNC: 188 MG/DL (ref 70–130)
GLUCOSE BLDC GLUCOMTR-MCNC: 253 MG/DL (ref 70–130)
GLUCOSE BLDC GLUCOMTR-MCNC: 260 MG/DL (ref 70–130)
GLUCOSE SERPL-MCNC: 182 MG/DL (ref 65–99)
POTASSIUM SERPL-SCNC: 4 MMOL/L (ref 3.5–5.2)
SODIUM SERPL-SCNC: 133 MMOL/L (ref 136–145)

## 2021-01-14 PROCEDURE — 80048 BASIC METABOLIC PNL TOTAL CA: CPT | Performed by: NURSE PRACTITIONER

## 2021-01-14 PROCEDURE — 99232 SBSQ HOSP IP/OBS MODERATE 35: CPT | Performed by: NURSE PRACTITIONER

## 2021-01-14 PROCEDURE — 99231 SBSQ HOSP IP/OBS SF/LOW 25: CPT | Performed by: NURSE PRACTITIONER

## 2021-01-14 PROCEDURE — 25010000002 ENOXAPARIN PER 10 MG: Performed by: THORACIC SURGERY (CARDIOTHORACIC VASCULAR SURGERY)

## 2021-01-14 PROCEDURE — 63710000001 INSULIN LISPRO (HUMAN) PER 5 UNITS: Performed by: NURSE PRACTITIONER

## 2021-01-14 PROCEDURE — 82962 GLUCOSE BLOOD TEST: CPT

## 2021-01-14 RX ADMIN — TAMSULOSIN HYDROCHLORIDE 0.4 MG: 0.4 CAPSULE ORAL at 08:26

## 2021-01-14 RX ADMIN — ENOXAPARIN SODIUM 90 MG: 100 INJECTION SUBCUTANEOUS at 20:26

## 2021-01-14 RX ADMIN — GABAPENTIN 600 MG: 300 CAPSULE ORAL at 21:32

## 2021-01-14 RX ADMIN — ASPIRIN 81 MG: 81 TABLET, CHEWABLE ORAL at 08:25

## 2021-01-14 RX ADMIN — ENOXAPARIN SODIUM 90 MG: 100 INJECTION SUBCUTANEOUS at 08:26

## 2021-01-14 RX ADMIN — AMIODARONE HYDROCHLORIDE 400 MG: 200 TABLET ORAL at 08:25

## 2021-01-14 RX ADMIN — FAMOTIDINE 20 MG: 20 TABLET, FILM COATED ORAL at 08:25

## 2021-01-14 RX ADMIN — INDAPAMIDE 1.25 MG: 1.25 TABLET, FILM COATED ORAL at 06:15

## 2021-01-14 RX ADMIN — FUROSEMIDE 40 MG: 40 TABLET ORAL at 08:26

## 2021-01-14 RX ADMIN — INSULIN LISPRO 6 UNITS: 100 INJECTION, SOLUTION INTRAVENOUS; SUBCUTANEOUS at 11:56

## 2021-01-14 RX ADMIN — ATORVASTATIN CALCIUM 20 MG: 10 TABLET, FILM COATED ORAL at 08:26

## 2021-01-14 RX ADMIN — AMIODARONE HYDROCHLORIDE 400 MG: 200 TABLET ORAL at 17:39

## 2021-01-14 RX ADMIN — SODIUM CHLORIDE, PRESERVATIVE FREE 10 ML: 5 INJECTION INTRAVENOUS at 20:26

## 2021-01-14 RX ADMIN — GABAPENTIN 600 MG: 300 CAPSULE ORAL at 06:15

## 2021-01-14 RX ADMIN — GABAPENTIN 600 MG: 300 CAPSULE ORAL at 13:28

## 2021-01-14 RX ADMIN — SODIUM CHLORIDE, PRESERVATIVE FREE 10 ML: 5 INJECTION INTRAVENOUS at 08:27

## 2021-01-14 RX ADMIN — INSULIN LISPRO 2 UNITS: 100 INJECTION, SOLUTION INTRAVENOUS; SUBCUTANEOUS at 17:39

## 2021-01-14 RX ADMIN — LISINOPRIL 5 MG: 5 TABLET ORAL at 08:26

## 2021-01-14 RX ADMIN — INSULIN LISPRO 2 UNITS: 100 INJECTION, SOLUTION INTRAVENOUS; SUBCUTANEOUS at 08:29

## 2021-01-14 RX ADMIN — CARVEDILOL 6.25 MG: 6.25 TABLET, FILM COATED ORAL at 17:39

## 2021-01-14 RX ADMIN — AMITRIPTYLINE HYDROCHLORIDE 100 MG: 25 TABLET, FILM COATED ORAL at 20:26

## 2021-01-14 RX ADMIN — CARVEDILOL 6.25 MG: 6.25 TABLET, FILM COATED ORAL at 08:25

## 2021-01-14 RX ADMIN — POTASSIUM CHLORIDE 20 MEQ: 750 CAPSULE, EXTENDED RELEASE ORAL at 08:26

## 2021-01-14 NOTE — PROGRESS NOTES
[facility] -  Progress Note     LOS: 6 days   Patient Care Team:  Alfredito Land MD as PCP - General (Family Medicine)    Chief Complaint:CAD follow-up    Subjective     Interval History: Patient sitting on the side of the bed.  He has been up walking the halls today.  He denies any chest pain, chest pressure, chest discomfort.  He denies any shortness of breath, dizziness, lightheadedness.  Telemetry stable, sinus rhythm.        Review of Systems:     Review of Systems   Constitutional: Negative for activity change, appetite change and fatigue.   Respiratory: Negative for chest tightness.    Cardiovascular: Negative for chest pain, palpitations and leg swelling.   Gastrointestinal: Negative for abdominal distention and abdominal pain.   Neurological: Negative for dizziness, weakness and light-headedness.   Psychiatric/Behavioral: Negative for agitation and confusion.     Objective     Vital Sign Min/Max for last 24 hours  Temp  Min: 97.4 °F (36.3 °C)  Max: 98.3 °F (36.8 °C)   BP  Min: 107/50  Max: 136/65   Pulse  Min: 57  Max: 74   Resp  Min: 18  Max: 18   SpO2  Min: 96 %  Max: 100 %   No data recorded   Weight  Min: 84.5 kg (186 lb 4.8 oz)  Max: 84.5 kg (186 lb 4.8 oz)         01/13/21 2034   Weight: 84.5 kg (186 lb 4.8 oz)       Physical Exam:    Vitals signs reviewed.   Constitutional:       Appearance: Healthy appearance.   Neck:      Musculoskeletal: Normal range of motion and neck supple.   Pulmonary:      Effort: Pulmonary effort is normal.      Breath sounds: Normal breath sounds. No wheezing. No rhonchi. No rales.   Chest:      Chest wall: Not tender to palpatation.   Cardiovascular:      Normal rate. Regular rhythm.      Murmurs: There is no murmur.      No gallop.   Edema:     Peripheral edema absent.   Abdominal:      General: Bowel sounds are normal.      Palpations: Abdomen is soft.   Musculoskeletal: Normal range of motion.   Skin:     General: Skin is warm and dry.    Neurological:      Mental Status: Alert and oriented to person, place and time.   Psychiatric:         Attention and Perception: Attention normal.         Mood and Affect: Mood normal.         Speech: Speech normal.         Cognition and Memory: Cognition normal.       Results Review:   Lab Results (last 72 hours)     Procedure Component Value Units Date/Time    POC Glucose Once [[id number]]  (Abnormal) Collected: 01/14/21 1102    Specimen: Blood Updated: 01/14/21 1125     Glucose 260 mg/dL      Comment: : [id number] Jerardo EuraisaMeter ID: [id number]       POC Glucose Once [[id number]]  (Abnormal) Collected: 01/14/21 0759    Specimen: Blood Updated: 01/14/21 0822     Glucose 172 mg/dL      Comment: : [id number] Jerardo EuraisaMeter ID: [id number]       Basic Metabolic Panel [[id number]]  (Abnormal) Collected: 01/14/21 0552    Specimen: Blood Updated: 01/14/21 0647     Glucose 182 mg/dL      BUN 20 mg/dL      Creatinine 0.81 mg/dL      Sodium 133 mmol/L      Potassium 4.0 mmol/L      Chloride 96 mmol/L      CO2 26.0 mmol/L      Calcium 9.7 mg/dL      eGFR Non African Amer 93 mL/min/1.73      BUN/Creatinine Ratio 24.7     Anion Gap 11.0 mmol/L     Narrative:      GFR Normal >60  Chronic Kidney Disease <60  Kidney Failure <15      POC Glucose Once [[id number]]  (Abnormal) Collected: 01/13/21 2057    Specimen: Blood Updated: 01/13/21 2110     Glucose 154 mg/dL      Comment: : [id number] Delfino WeinerahMeter ID: [id number]       POC Glucose Once [[id number]]  (Abnormal) Collected: 01/13/21 1628    Specimen: Blood Updated: 01/13/21 1639     Glucose 145 mg/dL      Comment: : [id number] Irma MikiaMeter ID: [id number]       POC Glucose Once [[id number]]  (Abnormal) Collected: 01/13/21 1103    Specimen: Blood Updated: 01/13/21 1120     Glucose 325 mg/dL      Comment: : [id number] Irma MikiaMeter ID: [id number]       Blood Gas, Arterial With Co-Ox [[id number]]  (Abnormal) Collected: 01/13/21 0906    Specimen:  Arterial Blood Updated: 01/13/21 0909     Site Left Radial     Reddy's Test Positive     pH, Arterial 7.460 pH units      Comment: 83 Value above reference range        pCO2, Arterial 30.9 mm Hg      Comment: 84 Value below reference range        pO2, Arterial 94.8 mm Hg      HCO3, Arterial 22.0 mmol/L      Base Excess, Arterial -0.9 mmol/L      Comment: 84 Value below reference range        O2 Saturation, Arterial 97.6 %      Hemoglobin, Blood Gas 14.8 g/dL      Hematocrit, Blood Gas 45.4 %      Oxyhemoglobin 96.4 %      Methemoglobin 0.50 %      Carboxyhemoglobin 0.7 %      A-a Gradiant 18.6 mmHg      Temperature 37.0 C      Sodium, Arterial 130 mmol/L      Comment: 84 Value below reference range        Potassium, Arterial 4.3 mmol/L      Barometric Pressure for Blood Gas 753 mmHg      Modality Room Air     Ventilator Mode NA     Note --     Collected by [id number]     Comment: Meter: [id number]     :  [id number]        pH, Temp Corrected 7.460 pH Units      pCO2, Temperature Corrected 30.9 mm Hg      pO2, Temperature Corrected 94.8 mm Hg     POC Glucose Once [[id number]]  (Abnormal) Collected: 01/13/21 0727    Specimen: Blood Updated: 01/13/21 0743     Glucose 183 mg/dL      Comment: : [id number] Irma MikiaMeter ID: [id number]       Blood Culture - Blood, Arm, Left [[id number]] Collected: 01/08/21 0046    Specimen: Blood from Arm, Left Updated: 01/13/21 0100     Blood Culture No growth at 5 days    Blood Culture - Blood, Arm, Right [[id number]] Collected: 01/08/21 0046    Specimen: Blood from Arm, Right Updated: 01/13/21 0100     Blood Culture No growth at 5 days    COVID PRE-OP / PRE-PROCEDURE SCREENING ORDER (NO ISOLATION) - Swab, Nasal Cavity [[id number]]  (Normal) Collected: 01/12/21 1552    Specimen: Swab from Nasal Cavity Updated: 01/12/21 1730    Narrative:      The following orders were created for panel order COVID PRE-OP / PRE-PROCEDURE SCREENING ORDER (NO ISOLATION) - Swab, Nasal  Cavity.  Procedure                               Abnormality         Status                     ---------                               -----------         ------                     COVID-19,[facility]...[[id number]]  Normal              Final result                 Please view results for these tests on the individual orders.    COVID-19,[facility] IN-HOUSE,Nasal Swab No Transport Media 3-4 HR TAT - Swab, Nasal Cavity [[id number]]  (Normal) Collected: 01/12/21 1552    Specimen: Swab from Nasal Cavity Updated: 01/12/21 1730     COVID19 Not Detected    Narrative:      Fact sheet for providers: https://www.fda.gov/media/617094/download     Fact sheet for patients: https://www.fda.gov/media/816951/download    Test performed by PCR.    POC Glucose Once [[id number]]  (Abnormal) Collected: 01/12/21 1621    Specimen: Blood Updated: 01/12/21 1644     Glucose 146 mg/dL      Comment: : [id number] Jason HeatherMeter ID: [id number]       POC Glucose Once [[id number]]  (Abnormal) Collected: 01/12/21 1041    Specimen: Blood Updated: 01/12/21 1103     Glucose 264 mg/dL      Comment: : [id number] Irma MikiaMeter ID: [id number]       POC Glucose Once [[id number]]  (Abnormal) Collected: 01/12/21 0811    Specimen: Blood Updated: 01/12/21 0827     Glucose 189 mg/dL      Comment: : [id number] Irma MikiaMeter ID: [id number]       Basic Metabolic Panel [[id number]]  (Abnormal) Collected: 01/12/21 0526    Specimen: Blood Updated: 01/12/21 0630     Glucose 174 mg/dL      BUN 18 mg/dL      Creatinine 0.75 mg/dL      Sodium 134 mmol/L      Potassium 4.1 mmol/L      Comment: Slight hemolysis detected by analyzer. Results may be affected.        Chloride 98 mmol/L      CO2 22.0 mmol/L      Calcium 9.8 mg/dL      eGFR Non African Amer 102 mL/min/1.73      BUN/Creatinine Ratio 24.0     Anion Gap 14.0 mmol/L     Narrative:      GFR Normal >60  Chronic Kidney Disease <60  Kidney Failure <15      CBC (No Diff) [[id number]]  (Normal)  Collected: 01/12/21 0526    Specimen: Blood Updated: 01/12/21 0604     WBC 9.89 10*3/mm3      RBC 4.95 10*6/mm3      Hemoglobin 14.0 g/dL      Hematocrit 41.8 %      MCV 84.4 fL      MCH 28.3 pg      MCHC 33.5 g/dL      RDW 12.6 %      RDW-SD 38.4 fl      MPV 11.1 fL      Platelets 229 10*3/mm3     POC Glucose Once [[id number]]  (Abnormal) Collected: 01/11/21 1952    Specimen: Blood Updated: 01/11/21 2022     Glucose 206 mg/dL      Comment: : [id number] Tyler JenniferMeter ID: [id number]       POC Glucose Once [[id number]]  (Abnormal) Collected: 01/11/21 1654    Specimen: Blood Updated: 01/11/21 1706     Glucose 191 mg/dL      Comment: : [id number] Jerardo EuraisaMeter ID: [id number]       Basic Metabolic Panel [[id number]]  (Abnormal) Collected: 01/11/21 1421    Specimen: Blood Updated: 01/11/21 1501     Glucose 159 mg/dL      BUN 18 mg/dL      Creatinine 0.80 mg/dL      Sodium 137 mmol/L      Potassium 4.1 mmol/L      Chloride 98 mmol/L      CO2 26.0 mmol/L      Calcium 10.2 mg/dL      eGFR Non African Amer 95 mL/min/1.73      BUN/Creatinine Ratio 22.5     Anion Gap 13.0 mmol/L     Narrative:      GFR Normal >60  Chronic Kidney Disease <60  Kidney Failure <15      CBC & Differential [[id number]]  (Abnormal) Collected: 01/11/21 1421    Specimen: Blood Updated: 01/11/21 1442    Narrative:      The following orders were created for panel order CBC & Differential.  Procedure                               Abnormality         Status                     ---------                               -----------         ------                     CBC Auto Differential[[id number]]        Abnormal            Final result                 Please view results for these tests on the individual orders.    CBC Auto Differential [[id number]]  (Abnormal) Collected: 01/11/21 1421    Specimen: Blood Updated: 01/11/21 1442     WBC 7.82 10*3/mm3      RBC 5.04 10*6/mm3      Hemoglobin 15.0 g/dL      Hematocrit 42.9 %      MCV 85.1 fL      MCH  29.8 pg      MCHC 35.0 g/dL      RDW 12.8 %      RDW-SD 39.6 fl      MPV 10.5 fL      Platelets 245 10*3/mm3      Neutrophil % 56.5 %      Lymphocyte % 25.2 %      Monocyte % 17.4 %      Eosinophil % 0.1 %      Basophil % 0.5 %      Immature Grans % 0.3 %      Neutrophils, Absolute 4.42 10*3/mm3      Lymphocytes, Absolute 1.97 10*3/mm3      Monocytes, Absolute 1.36 10*3/mm3      Eosinophils, Absolute 0.01 10*3/mm3      Basophils, Absolute 0.04 10*3/mm3      Immature Grans, Absolute 0.02 10*3/mm3      nRBC 0.0 /100 WBC         Medication Review: yes  Current Facility-Administered Medications   Medication Dose Route Frequency Provider Last Rate Last Admin   • acetaminophen (TYLENOL) tablet 650 mg  650 mg Oral Q4H PRN Cade Hong MD       • acetaminophen (TYLENOL) tablet 650 mg  650 mg Oral Q4H PRN Cade Hong MD       • amiodarone (PACERONE) tablet 400 mg  400 mg Oral BID With Meals Reji Wood MD   400 mg at 01/14/21 0825   • amitriptyline (ELAVIL) tablet 100 mg  100 mg Oral Nightly Cade Hong MD   100 mg at 01/13/21 2204   • aspirin chewable tablet 81 mg  81 mg Oral Daily Cade Hong MD   81 mg at 01/14/21 0825   • atorvastatin (LIPITOR) tablet 20 mg  20 mg Oral Daily Reji Wood MD   20 mg at 01/14/21 0826   • carvedilol (COREG) tablet 6.25 mg  6.25 mg Oral BID With Meals Cade Hong MD   6.25 mg at 01/14/21 0825   • dextrose (D50W) 25 g/ 50mL Intravenous Solution 25 g  25 g Intravenous Q15 Min PRN Cade Hong MD       • dextrose (GLUTOSE) oral gel 15 g  15 g Oral Q15 Min PRN Cade Hong MD       • enoxaparin (LOVENOX) syringe 90 mg  1 mg/kg Subcutaneous Q12H Reji Wood MD   90 mg at 01/14/21 0826   • famotidine (PEPCID) tablet 20 mg  20 mg Oral Daily Cade Hong MD   20 mg at 01/14/21 0825   • furosemide (LASIX) tablet 40 mg  40 mg Oral Daily Cade Hong MD   40 mg at 01/14/21 0826   • gabapentin (NEURONTIN) capsule 600 mg  600 mg Oral Q8H Gely  Cade Wells MD   600 mg at 01/14/21 1328   • glucagon (human recombinant) (GLUCAGEN DIAGNOSTIC) injection 1 mg  1 mg Subcutaneous Q15 Min PRN Cade Hong MD       • indapamide (LOZOL) tablet 1.25 mg  1.25 mg Oral Q AM Cade Hong MD   1.25 mg at 01/14/21 0615   • insulin lispro (humaLOG, ADMELOG) injection 0-9 Units  0-9 Units Subcutaneous TID AC Jeannine Yanez APRN   6 Units at 01/14/21 1156   • lisinopril (PRINIVIL,ZESTRIL) tablet 5 mg  5 mg Oral Q24H Cade Hong MD   5 mg at 01/14/21 0826   • ondansetron (ZOFRAN) injection 4 mg  4 mg Intravenous Q6H PRN Cade Hong MD       • potassium chloride (MICRO-K) CR capsule 20 mEq  20 mEq Oral Daily Cade Hong MD   20 mEq at 01/14/21 0826   • sodium chloride 0.9 % flush 10 mL  10 mL Intravenous PRN Cade Hong MD       • sodium chloride 0.9 % flush 10 mL  10 mL Intravenous Q12H Cade Hong MD   10 mL at 01/14/21 0827   • sodium chloride 0.9 % flush 10 mL  10 mL Intravenous PRN Codi Hester PA-C       • sodium chloride 0.9 % infusion  1-3 mL/kg/hr Intravenous Continuous Codi Hester PA-C 100 mL/hr at 01/11/21 1435 1.149 mL/kg/hr at 01/11/21 1435   • tamsulosin (FLOMAX) 24 hr capsule 0.4 mg  0.4 mg Oral Daily Cade Hong MD   0.4 mg at 01/14/21 0826   • traMADol-acetaminophen (ULTRACET) 37.5-325 MG per tablet 1 tablet  1 tablet Oral BID PRN Cade Hong MD   1 tablet at 01/08/21 1504     Results for orders placed during the hospital encounter of 01/07/21   Adult Transthoracic Echo Complete W/ Cont if Necessary Per Protocol    Narrative · Left ventricular ejection fraction appears to be 36 - 40%. Left   ventricular systolic function is moderately decreased.  · The following left ventricular wall segments are hypokinetic: mid   anterior, apical anterior, apical lateral, apical inferior, mid inferior,   apical septal, mid inferoseptal, apex hypokinetic, mid anteroseptal and   basal inferior.  · Left ventricular  diastolic function is consistent with (grade I)   impaired relaxation.  · Normal right ventricular cavity size and systolic function noted.  · There is no significant (greater than mild) valvular dysfunction.        Cardiac catheterization 1/11/2021:  Selective coronary angiography:  1. Left main: The left main is a long vessel trifurcates into the LAD, ramus intermedius, and left circumflex arteries.  There is severe 70 to 80% proximal stenosis and 60 to 70% distal stenosis.  2. Left anterior descending artery: The LAD runs in the interventricular groove giving off 2 diagonals and multiple septal perforators before wrapping around the apex as an apical recurrent branch.  There is a severe 90 to 95% stenosis in the LAD between the first and second diagonal branches.  There is a severe proximal 90 to 95% stenosis of the proximal first diagonal.  3. Ramus intermedius: There is a severe 80 to 90% stenosis in the proximal segment.  4. Left circumflex artery: The left circumflex is nondominant for posterior circulation.  It gives off 1 significant obtuse marginal branch. There is 70 to 80% stenosis severe stenosis of the proximal circumflex.  5. Right coronary artery: The RCA is dominant for posterior circulation giving rise to PDA and right PL branches.  There is severe 90 to 95% proximal stenosis and 80 to 90% mid RCA stenosis.        Impression:  1.  Severe three-vessel coronary disease as described above.  2.  Very mildly elevated left heart filling pressures.    Assessment/Plan     - NSTEMI    - Coronary artery disease: Severe three-vessel disease noted on cardiac catheterization 1/11/2021.  Evaluated by cardiothoracic surgery with plans for coronary artery bypass grafting during current inpatient stay .  Continue aspirin, statin.  Plavix has been discontinued.    - Acute systolic congestive heart failure: The patient was initially symptomatic with shortness of breath and treated with IV diuretics.  Now asymptomatic  and euvolemic on exam. On oral lasix (transitioned from IV) and indapamide (home med) with mildly low Na. Will stop indapamide.     Hyponatremia: mildly low. Pt is on indapamide at home which was continued on admission. He has also been transitioned to a loop diuretic which he was not on at home. Continue oral furosemide, D/C indapamide.     - Ischemic cardiomyopathy: EF 35 to 40% on most recent echo referenced above continue with guideline directed medical therapy including his current regimen Coreg, lisinopril, and oral Lasix.  Of note, he was ramipril at home as well as amlodipine.  His amlodipine has been discontinued and lisinopril was substituted for ramipril.  Carvedilol has been added as a new medication during his hospitalization.  He was not on beta-blocker therapy at home.    -Hypertension: Well-controlled on current medications.    - Mixed hyperlipidemia: On statin therapy with atorvastatin 20 mg. Home med was simvastatin 40mg.  LDL and lipid panel during current hospitalization was 82.  LDL goal due to  findings of coronary artery disease would be <70. Would recommend the patient to be on high intensity statin at discharge.    - Diabetes Mellitus: Hgb A1c 7.10% home medications include metformin    Tentative plans for CABG 1/18/2021      Electronically signed by ANGEL Phipps, 01/14/21, 2:01 PM CST.

## 2021-01-14 NOTE — PLAN OF CARE
Goal Outcome Evaluation:  Plan of Care Reviewed With: patient  Progress: no change  Outcome Summary: VSS. No c/o pain this shift. HR sinus with multifocal PVC's on tele.

## 2021-01-14 NOTE — PROGRESS NOTES
[facility] Medicine Services  INPATIENT PROGRESS NOTE    Patient Name: Wesley Eduardo  Date of Admission: 1/7/2021  Today's Date: 01/14/21  Length of Stay: 6  Primary Care Physician: Alfredito Land MD    Subjective   Chief Complaint: Dyspnea on admission  HPI   Patient was sitting up in the chair watching TV.  Wife at bedside.  Patient asked if he is able to ambulate in the hallway as he feels his legs are getting stiff from sitting.  He remains on room air.  He denies any acute complaints.  CT surgery planning for a limited echo prior to surgery.    Review of Systems   All pertinent negatives and positives are as above. All other systems have been reviewed and are negative unless otherwise stated.     Objective    Temp:  [97.4 °F (36.3 °C)-98.3 °F (36.8 °C)] 97.4 °F (36.3 °C)  Heart Rate:  [57-77] 71  Resp:  [16-18] 18  BP: (106-136)/(46-77) 134/63  Physical Exam  Vitals signs reviewed.   Constitutional:       General: He is not in acute distress.     Appearance: He is not toxic-appearing.      Comments: Sitting up in bed. Tolerating room air. Wife at bedside.  HENT:      Head: Normocephalic and atraumatic.      Mouth/Throat:      Mouth: Mucous membranes are moist.      Pharynx: Oropharynx is clear.   Eyes:      Extraocular Movements: Extraocular movements intact.      Conjunctiva/sclera: Conjunctivae normal.      Pupils: Pupils are equal, round, and reactive to light.   Neck:      Musculoskeletal: Normal range of motion and neck supple. No muscular tenderness.   Cardiovascular:      Rate and Rhythm: Normal rate and regular rhythm.      Pulses: Normal pulses.      Heart sounds: No murmur.      Comments: S 60-80s  Pulmonary:      Effort: Pulmonary effort is normal.      Breath sounds: Normal breath sounds. No wheezing.   Abdominal:      General: Bowel sounds are normal. There is no distension.      Palpations: Abdomen is soft.      Tenderness: There is no abdominal  tenderness.   Musculoskeletal: Normal range of motion.         General: No swelling or tenderness.   Skin:     General: Skin is warm and dry.      Findings: No erythema or rash.   Neurological:      General: No focal deficit present.      Mental Status: He is alert and oriented to person, place, and time.      Cranial Nerves: No cranial nerve deficit.      Motor: No weakness.   Psychiatric:         Mood and Affect: Mood normal.         Behavior: Behavior normal.     Results Review:  I have reviewed the labs, radiology results, and diagnostic studies.    Laboratory Data:   Results from last 7 days   Lab Units 01/12/21  0526 01/11/21 1421 01/09/21  0208   WBC 10*3/mm3 9.89 7.82 11.22*   HEMOGLOBIN g/dL 14.0 15.0 13.0   HEMATOCRIT % 41.8 42.9 37.3*   PLATELETS 10*3/mm3 229 245 208     Results from last 7 days   Lab Units 01/14/21  0552 01/13/21  0906 01/12/21  0526 01/11/21 1421 01/09/21 0208 01/08/21  0046   SODIUM mmol/L 133*  --  134* 137   < > 136 134*   SODIUM, ARTERIAL mmol/L  --  130*  --   --   --   --   --    POTASSIUM mmol/L 4.0  --  4.1 4.1   < > 3.7 5.0   CHLORIDE mmol/L 96*  --  98 98   < > 98 98   CO2 mmol/L 26.0  --  22.0 26.0   < > 27.0 23.0   BUN mg/dL 20  --  18 18   < > 18 20   CREATININE mg/dL 0.81  --  0.75* 0.80   < > 0.84 0.76   CALCIUM mg/dL 9.7  --  9.8 10.2   < > 9.3 10.1   BILIRUBIN mg/dL  --   --   --   --   --  0.6 0.5   ALK PHOS U/L  --   --   --   --   --  55 68   ALT (SGPT) U/L  --   --   --   --   --  15 17   AST (SGOT) U/L  --   --   --   --   --  39 29   GLUCOSE mg/dL 182*  --  174* 159*   < > 161* 230*    < > = values in this interval not displayed.     I have reviewed the patient's current medications.     Assessment/Plan     Active Hospital Problems    Diagnosis   • **Acute systolic congestive heart failure (CMS/[facility])   • 3-vessel coronary artery disease   • NSTEMI (non-ST elevated myocardial infarction) (CMS/[facility])   • Type 2 diabetes mellitus, without long-term current use of  insulin (CMS/[facility])   • Hypertension   • Hyperlipidemia     Plan:  1.  Patient was transferred from [facility] on 1/7 for complaints of shortness of breath.  He was found to have a NSTEMI was transferred to our facility for higher level of care. He was also found to be hypoxic and tachypnic.  He was placed on a Vapotherm at 20 L at 50%. CTA of the chest negative for pulmonary embolus.  Concern for fluid overload with bilateral small pleural effusions.  ProBNP 1,608.  He was started on diruesis.  2.  Cardiology evaluating patient.  Echo on 1/8 shows ejection fraction of 30 to 40% with grade 1 diastolic dysfunction.  Lasix was transitioned to oral on 1/10.  Continue aspirin, beta-blocker, ACE-I and statin.  Cardiac catheterization on 1/11 showed severe three-vessel coronary disease.    3.  CT surgery evaluating patient.  Last dose of Plavix is on 1/11.  Oral amiodarone initiated per CT surgery.  Preoperative work-up ongoing for CABG with plans for tentative date of 1/18/2021 during this hospital encounter.  3.  A1c 7.1.  Moderate dose sliding scale insulin.  Metformin on hold.  4.  Mild hyponatremia stable at 133.  5.  Work-up ongoing.     Discharge Planning: Patent undergoing pre operative work up for tentative CABG Monday 1/18/2021 during this hospital encounter.     Electronically signed by ANGEL Berry, 01/14/21, 09:51 CST.    I personally evaluated and examined the patient in conjunction with ANGEL Winters and agree with the assessment, treatment plan, and disposition of the patient as recorded by her. My history, exam, and further recommendations are:     Seen and discussed with his wife.     He is up in the chair.  He looks good.  He is eating lunch.  I brought him 5 issues of [employer] magazine this morning as he is extremely bored waiting for his bypass procedure on Monday.    He states that he feels great.  He has been up and walked twice today.  He is not short of breath and has  no chest pain.  He appears completely compensated from a CHF standpoint.    Electronically signed by Rolando Simental DO, 1/14/2021, 13:11 CST.

## 2021-01-14 NOTE — PROGRESS NOTES
"Patient name: Wesley Eduardo  Patient : 1945  VISIT # [id number]  MR #[id number]    Procedure:Procedure(s):  Left Heart Cath  Procedure Date:2021  POD:3 Day Post-Op    Subjective   Chief Complaint   Patient presents with   • Shortness of Breath     Sitting up in the chair. On room air. No chest pain or shortness of breath. He is working on not using upper extremities for position changes. Has received cardiac surgery handout. Wife at bedside. IS ~3000 ml.     Telemetry: sinus 65-76 bpm    ROS: no fevers or chills       Objective     Visit Vitals  /63 (BP Location: Right arm, Patient Position: Sitting)   Pulse 71   Temp 97.4 °F (36.3 °C) (Oral)   Resp 18   Ht 175.3 cm (69\")   Wt 84.5 kg (186 lb 4.8 oz)   SpO2 100%   BMI 27.51 kg/m²       Intake/Output Summary (Last 24 hours) at 2021 1103  Last data filed at 2021 0800  Gross per 24 hour   Intake 720 ml   Output 1950 ml   Net -1230 ml       Lab:     CBC:  Results from last 7 days   Lab Units 21  0526 21  1421 21  0208   WBC 10*3/mm3 9.89 7.82 11.22*   HEMATOCRIT % 41.8 42.9 37.3*   PLATELETS 10*3/mm3 229 245 208          BMP:  Results from last 7 days   Lab Units 21  0552 21  0906 21  0526 21  1421   SODIUM mmol/L 133*  --  134* 137   SODIUM, ARTERIAL mmol/L  --  130*  --   --    POTASSIUM mmol/L 4.0  --  4.1 4.1   CHLORIDE mmol/L 96*  --  98 98   CO2 mmol/L 26.0  --  22.0 26.0   GLUCOSE mg/dL 182*  --  174* 159*   BUN mg/dL 20  --  18 18   CREATININE mg/dL 0.81  --  0.75* 0.80          COAG:  Results from last 7 days   Lab Units 21  0046   INR  1.03       IMAGES:       Imaging Results (Last 24 Hours)     ** No results found for the last 24 hours. **            Study Result    History: Carotid occlusive disease     IMPRESSION:  Impression:  1. There is less than 50% stenosis of the right internal carotid artery.  2. There is 50-69% stenosis of the left internal carotid artery.  3. Antegrade flow " is demonstrated in bilateral vertebral arteries.     Comments: Bilateral carotid vertebral arterial duplex scan was  performed.     Grayscale imaging shows intimal thickening and calcified elements at the  carotid bifurcation. The right internal carotid artery peak systolic  velocity is 80.2 cm/sec. The end-diastolic velocity is 23.7 cm/sec. The  right ICA/CCA ratio is approximately 1.1 . These findings correlate with  less than 50% stenosis of the right internal carotid artery.     Grayscale imaging shows intimal thickening and calcified elements at the  carotid bifurcation. The left internal carotid artery peak systolic  velocity is 150.1 cm/sec. The end-diastolic velocity is 36.2 cm/sec. The  left ICA/CCA ratio is approximately 2.2 . These findings correlate with  50-69% stenosis of the left internal carotid artery.     Antegrade flow is demonstrated in bilateral vertebral arteries.  There is greater than 50% stenosis in the left external carotid artery.  This report was finalized on 01/12/2021 15:27 by Dr. Markus Carr MD.     Findings:  Left heart catheterization:  Pressures:  1.  LV: 97/14 mmHg  2.  Aortic: 97/56 mmHg     Selective coronary angiography:  1. Left main: The left main is a long vessel trifurcates into the LAD, ramus intermedius, and left circumflex arteries.  There is severe 70 to 80% proximal stenosis and 60 to 70% distal stenosis.  2. Left anterior descending artery: The LAD runs in the interventricular groove giving off 2 diagonals and multiple septal perforators before wrapping around the apex as an apical recurrent branch.  There is a severe 90 to 95% stenosis in the LAD between the first and second diagonal branches.  There is a severe proximal 90 to 95% stenosis of the proximal first diagonal.  3. Ramus intermedius: There is a severe 80 to 90% stenosis in the proximal segment.  4. Left circumflex artery: The left circumflex is nondominant for posterior circulation.  It gives off 1  significant obtuse marginal branch. There is 70 to 80% stenosis severe stenosis of the proximal circumflex.  5. Right coronary artery: The RCA is dominant for posterior circulation giving rise to PDA and right PL branches.  There is severe 90 to 95% proximal stenosis and 80 to 90% mid RCA stenosis.     Impression:  1.  Severe three-vessel coronary disease as described above.  2.  Very mildly elevated left heart filling pressures.    Echocardiogram Findings    Left Ventricle Left ventricular ejection fraction appears to be 36 - 40%. Left ventricular systolic function is moderately decreased.   Normal left ventricular cavity size and wall thickness noted. Left ventricular diastolic function is consistent with (grade I) impaired relaxation.   Right Ventricle Normal right ventricular cavity size and systolic function noted.   Left Atrium The left atrial cavity is borderline dilated.   Right Atrium Normal right atrial cavity size noted.   Aortic Valve The aortic valve is abnormal in structure. There is mild thickening of the aortic valve. The aortic valve appears trileaflet. No significant aortic valve regurgitation is present. No hemodynamically significant aortic valve stenosis is present.   Mitral Valve The mitral valve is grossly normal in structure. Mild mitral valve regurgitation is present. No significant mitral valve stenosis is present.   Tricuspid Valve The tricuspid valve is structurally normal with no significant stenosis present. Trace tricuspid valve regurgitation is present. Insufficient TR velocity profile to estimate the right ventricular systolic pressure.   Pulmonic Valve The pulmonic valve is not well visualized. There is no significant pulmonic valve regurgitation present. There is no pulmonic valve stenosis present.   Greater Vessels No dilation of the aortic root is present. The inferior vena cava is normally sized. Normal IVC inspiratory collapse of greater than 50% noted.   Pericardium The  pericardium is normal. There is no evidence of pericardial effusion. .         Physical Exam:  General: Alert, oriented. No apparent distress.   Cardiovascular: Regular rate and rhythm without murmur, rubs, or gallops.    Pulmonary: Clear to auscultation bilaterally without wheezing, rubs, or rales.  Abdomen: Soft, non distended, and non tender.  Extremities: Warm, moves all extremities. No edema.   Neurologic:  Grossly intact with no focal deficits.            Impression:  Acute systolic congestive heart failure (CMS/HCC)  NSTEMI (non-ST elevated myocardial infarction) (CMS/HCC)  Acute pulmonary edema (CMS/HCC)  Diabetes mellitus (CMS/HCC)  Hypertension  Hyperlipidemia  Coronary artery disease, multivessel  Aspirin like platelet function defect, day 3 off plavix   Bilateral carotid artery stenosis, asymptomatic      Plan:  Continue to hold Plavix  Repeat echo closer to time of surgery to reassess LV function  Tentative CABG Monday 1/18/2021 during this hospital encounter  DW patient and nursing       Viky Bañuelos, APRN  01/14/21  11:03 CST

## 2021-01-15 ENCOUNTER — ANESTHESIA EVENT (OUTPATIENT)
Dept: PERIOP | Facility: HOSPITAL | Age: 76
End: 2021-01-15

## 2021-01-15 LAB
GLUCOSE BLDC GLUCOMTR-MCNC: 147 MG/DL (ref 70–130)
GLUCOSE BLDC GLUCOMTR-MCNC: 229 MG/DL (ref 70–130)
GLUCOSE BLDC GLUCOMTR-MCNC: 236 MG/DL (ref 70–130)
GLUCOSE BLDC GLUCOMTR-MCNC: 311 MG/DL (ref 70–130)

## 2021-01-15 PROCEDURE — 82962 GLUCOSE BLOOD TEST: CPT

## 2021-01-15 PROCEDURE — 25010000002 ENOXAPARIN PER 10 MG: Performed by: THORACIC SURGERY (CARDIOTHORACIC VASCULAR SURGERY)

## 2021-01-15 PROCEDURE — 99232 SBSQ HOSP IP/OBS MODERATE 35: CPT | Performed by: NURSE PRACTITIONER

## 2021-01-15 PROCEDURE — 94799 UNLISTED PULMONARY SVC/PX: CPT

## 2021-01-15 PROCEDURE — 63710000001 INSULIN LISPRO (HUMAN) PER 5 UNITS: Performed by: NURSE PRACTITIONER

## 2021-01-15 PROCEDURE — 99231 SBSQ HOSP IP/OBS SF/LOW 25: CPT | Performed by: NURSE PRACTITIONER

## 2021-01-15 RX ADMIN — GABAPENTIN 600 MG: 300 CAPSULE ORAL at 05:25

## 2021-01-15 RX ADMIN — AMIODARONE HYDROCHLORIDE 400 MG: 200 TABLET ORAL at 17:30

## 2021-01-15 RX ADMIN — LISINOPRIL 5 MG: 5 TABLET ORAL at 08:37

## 2021-01-15 RX ADMIN — SODIUM CHLORIDE, PRESERVATIVE FREE 10 ML: 5 INJECTION INTRAVENOUS at 20:31

## 2021-01-15 RX ADMIN — SODIUM CHLORIDE, PRESERVATIVE FREE 10 ML: 5 INJECTION INTRAVENOUS at 08:39

## 2021-01-15 RX ADMIN — INSULIN LISPRO 4 UNITS: 100 INJECTION, SOLUTION INTRAVENOUS; SUBCUTANEOUS at 08:37

## 2021-01-15 RX ADMIN — ATORVASTATIN CALCIUM 20 MG: 10 TABLET, FILM COATED ORAL at 08:37

## 2021-01-15 RX ADMIN — GABAPENTIN 600 MG: 300 CAPSULE ORAL at 14:59

## 2021-01-15 RX ADMIN — AMITRIPTYLINE HYDROCHLORIDE 100 MG: 25 TABLET, FILM COATED ORAL at 20:31

## 2021-01-15 RX ADMIN — INSULIN LISPRO 7 UNITS: 100 INJECTION, SOLUTION INTRAVENOUS; SUBCUTANEOUS at 11:32

## 2021-01-15 RX ADMIN — FAMOTIDINE 20 MG: 20 TABLET, FILM COATED ORAL at 08:37

## 2021-01-15 RX ADMIN — GABAPENTIN 600 MG: 300 CAPSULE ORAL at 21:03

## 2021-01-15 RX ADMIN — POTASSIUM CHLORIDE 20 MEQ: 750 CAPSULE, EXTENDED RELEASE ORAL at 08:37

## 2021-01-15 RX ADMIN — ASPIRIN 81 MG: 81 TABLET, CHEWABLE ORAL at 08:37

## 2021-01-15 RX ADMIN — ENOXAPARIN SODIUM 90 MG: 100 INJECTION SUBCUTANEOUS at 08:38

## 2021-01-15 RX ADMIN — CARVEDILOL 6.25 MG: 6.25 TABLET, FILM COATED ORAL at 17:30

## 2021-01-15 RX ADMIN — FUROSEMIDE 40 MG: 40 TABLET ORAL at 08:37

## 2021-01-15 RX ADMIN — AMIODARONE HYDROCHLORIDE 400 MG: 200 TABLET ORAL at 08:37

## 2021-01-15 RX ADMIN — TAMSULOSIN HYDROCHLORIDE 0.4 MG: 0.4 CAPSULE ORAL at 08:37

## 2021-01-15 RX ADMIN — CARVEDILOL 6.25 MG: 6.25 TABLET, FILM COATED ORAL at 08:37

## 2021-01-15 NOTE — PLAN OF CARE
Goal Outcome Evaluation:  Plan of Care Reviewed With: patient  Progress: no change   VSS. No C/O pain this shift. Sinus/sinus kem 59-65 with mf pvc on tele. Continues on room air with saturation in the 90s. Anticipating CABG soon. Safety maintained. Will continue to monitor.

## 2021-01-15 NOTE — PROGRESS NOTES
[facility] Medicine Services  INPATIENT PROGRESS NOTE    Patient Name: Wesley Eduardo  Date of Admission: 1/7/2021  Today's Date: 01/15/21  Length of Stay: 7  Primary Care Physician: Alfredito Land MD    Subjective   Chief Complaint: Dyspnea on admission  HPI   Patient was sitting up in the chair.  He denies shortness of breath, chest pain or pressure.  He tells me he was admitted ambulating the hallway 6 times yesterday without difficulty.  He has been maintaining normal sinus rhythm on telemetry.    Review of Systems   All pertinent negatives and positives are as above. All other systems have been reviewed and are negative unless otherwise stated.     Objective    Temp:  [97.3 °F (36.3 °C)-98.4 °F (36.9 °C)] 98.4 °F (36.9 °C)  Heart Rate:  [60-64] 63  Resp:  [18] 18  BP: (107-137)/(50-69) 134/58  Physical Exam   Vitals signs reviewed.   Constitutional:       General: He is not in acute distress.     Appearance: He is not toxic-appearing.      Comments: Sitting up in bed. Tolerating room air. Wife at bedside.  HENT:      Head: Normocephalic and atraumatic.      Mouth/Throat:      Mouth: Mucous membranes are moist.      Pharynx: Oropharynx is clear.   Eyes:      Extraocular Movements: Extraocular movements intact.      Conjunctiva/sclera: Conjunctivae normal.      Pupils: Pupils are equal, round, and reactive to light.   Neck:      Musculoskeletal: Normal range of motion and neck supple. No muscular tenderness.   Cardiovascular:      Rate and Rhythm: Normal rate and regular rhythm.      Pulses: Normal pulses.      Heart sounds: No murmur.      Comments: SB-S 59-66  Pulmonary:      Effort: Pulmonary effort is normal.      Breath sounds: Normal breath sounds. No wheezing.   Abdominal:      General: Bowel sounds are normal. There is no distension.      Palpations: Abdomen is soft.      Tenderness: There is no abdominal tenderness.   Musculoskeletal: Normal range of motion.          General: No swelling or tenderness.   Skin:     General: Skin is warm and dry.      Findings: No erythema or rash.   Neurological:      General: No focal deficit present.      Mental Status: He is alert and oriented to person, place, and time.      Cranial Nerves: No cranial nerve deficit.      Motor: No weakness.   Psychiatric:         Mood and Affect: Mood normal.         Behavior: Behavior normal.     Results Review:  I have reviewed the labs, radiology results, and diagnostic studies.    Laboratory Data:   Results from last 7 days   Lab Units 01/12/21  0526 01/11/21 1421 01/09/21  0208   WBC 10*3/mm3 9.89 7.82 11.22*   HEMOGLOBIN g/dL 14.0 15.0 13.0   HEMATOCRIT % 41.8 42.9 37.3*   PLATELETS 10*3/mm3 229 245 208        Results from last 7 days   Lab Units 01/14/21  0552 01/13/21  0906 01/12/21 0526 01/11/21 1421 01/09/21  0208   SODIUM mmol/L 133*  --  134* 137   < > 136   SODIUM, ARTERIAL mmol/L  --  130*  --   --   --   --    POTASSIUM mmol/L 4.0  --  4.1 4.1   < > 3.7   CHLORIDE mmol/L 96*  --  98 98   < > 98   CO2 mmol/L 26.0  --  22.0 26.0   < > 27.0   BUN mg/dL 20  --  18 18   < > 18   CREATININE mg/dL 0.81  --  0.75* 0.80   < > 0.84   CALCIUM mg/dL 9.7  --  9.8 10.2   < > 9.3   BILIRUBIN mg/dL  --   --   --   --   --  0.6   ALK PHOS U/L  --   --   --   --   --  55   ALT (SGPT) U/L  --   --   --   --   --  15   AST (SGOT) U/L  --   --   --   --   --  39   GLUCOSE mg/dL 182*  --  174* 159*   < > 161*    < > = values in this interval not displayed.     I have reviewed the patient's current medications.     Assessment/Plan     Active Hospital Problems    Diagnosis   • **Acute systolic congestive heart failure (CMS/HCC)   • 3-vessel coronary artery disease   • NSTEMI (non-ST elevated myocardial infarction) (CMS/HCC)   • Type 2 diabetes mellitus, without long-term current use of insulin (CMS/[facility])   • Hypertension   • Hyperlipidemia     Plan:  1.  Patient was transferred from [facility] on 1/7 for complaints of shortness of breath.  He was found to have a NSTEMI was transferred to our facility for higher level of care. He was also found to be hypoxic and tachypnic.  He was placed on a Vapotherm at 20 L at 50%. CTA of the chest negative for pulmonary embolus.  Concern for fluid overload with bilateral small pleural effusions.  ProBNP 1,608.  He was started on diruesis.  2.  Cardiology evaluating patient.  Echo on 1/8 shows ejection fraction of 30 to 40% with grade 1 diastolic dysfunction.  Lasix was transitioned to oral on 1/10.  Continue aspirin, beta-blocker, ACE-I and statin.  Cardiac catheterization on 1/11 showed severe three-vessel coronary disease.    3.  CT surgery evaluating patient.  Last dose of Plavix is on 1/11.  Oral amiodarone initiated per CT surgery.  Preoperative work-up ongoing for CABG with plans for tentative date of 1/18/2021 during this hospital encounter.  3.  A1c 7.1.  Moderate dose sliding scale insulin.  Metformin on hold.  4.  Mild hyponatremia stable.  BMP in AM.  5.  Encourage ambulation as tolerated.  Incentive spirometry.  6.  Work-up ongoing.     Discharge Planning: Patent undergoing pre operative work up for tentative CABG Monday 1/18/2021 during this hospital encounter.     Electronically signed by ANGEL Berry, 01/15/21, 08:40 CST.    I personally evaluated and examined the patient in conjunction with ANGEL Winters and agree with the assessment, treatment plan, and disposition of the patient as recorded by her. My history, exam, and further recommendations are:     Discussed with his nurse, Shirlene.    Seen and discussed with his wife.    He continues to do well.  He has no new symptoms.  We talked quite a bit about trapshooting and hunting today.  He appreciated the magazines that I brought him.  They are keeping him occupied.    He told me today that he received his first COVID-19 vaccination on 1/3 at the [facility].   He is due to receive his second vaccine on 2/3.  He was asking if this was a good idea or not since he is having a bypass surgery in the interim.  I told him that I see no problems with it whatsoever, but would also make Dr. Wood aware and also run it past infectious disease this weekend.    Surgery on Monday.    Electronically signed by Rolando Simental DO, 1/15/2021, 13:35 CST.

## 2021-01-15 NOTE — PROGRESS NOTES
"Patient name: Wesley Eduardo  Patient : 1945  VISIT # [id number]  MR #[id number]    Procedure:Procedure(s):  Left Heart Cath  Procedure Date:2021  POD:4 Day Post-Op    Subjective   Chief Complaint   Patient presents with   • Shortness of Breath     Sitting up in the chair. On room air. No chest pain or shortness of breath. Right groin hematoma post cardiac catheterization. Wife at bedside.     Telemetry: sinus bradycardia to sinus 59-65 bpm    ROS: no fevers or chills       Objective     Visit Vitals  /55 (BP Location: Left arm, Patient Position: Lying)   Pulse 68   Temp 98.1 °F (36.7 °C) (Oral)   Resp 18   Ht 175.3 cm (69\")   Wt 85.2 kg (187 lb 14.4 oz)   SpO2 98%   BMI 27.75 kg/m²       Intake/Output Summary (Last 24 hours) at 1/15/2021 1703  Last data filed at 1/15/2021 1503  Gross per 24 hour   Intake 1560 ml   Output 2050 ml   Net -490 ml       Lab:     CBC:  Results from last 7 days   Lab Units 21  0526 21  1421 21  0208   WBC 10*3/mm3 9.89 7.82 11.22*   HEMATOCRIT % 41.8 42.9 37.3*   PLATELETS 10*3/mm3 229 245 208          BMP:  Results from last 7 days   Lab Units 21  0552 21  0906 21  0526 21  1421   SODIUM mmol/L 133*  --  134* 137   SODIUM, ARTERIAL mmol/L  --  130*  --   --    POTASSIUM mmol/L 4.0  --  4.1 4.1   CHLORIDE mmol/L 96*  --  98 98   CO2 mmol/L 26.0  --  22.0 26.0   GLUCOSE mg/dL 182*  --  174* 159*   BUN mg/dL 20  --  18 18   CREATININE mg/dL 0.81  --  0.75* 0.80          COAG:        Invalid input(s): PT    IMAGES:       Imaging Results (Last 24 Hours)     ** No results found for the last 24 hours. **            Study Result    History: Carotid occlusive disease     IMPRESSION:  Impression:  1. There is less than 50% stenosis of the right internal carotid artery.  2. There is 50-69% stenosis of the left internal carotid artery.  3. Antegrade flow is demonstrated in bilateral vertebral arteries.     Comments: Bilateral carotid " vertebral arterial duplex scan was  performed.     Grayscale imaging shows intimal thickening and calcified elements at the  carotid bifurcation. The right internal carotid artery peak systolic  velocity is 80.2 cm/sec. The end-diastolic velocity is 23.7 cm/sec. The  right ICA/CCA ratio is approximately 1.1 . These findings correlate with  less than 50% stenosis of the right internal carotid artery.     Grayscale imaging shows intimal thickening and calcified elements at the  carotid bifurcation. The left internal carotid artery peak systolic  velocity is 150.1 cm/sec. The end-diastolic velocity is 36.2 cm/sec. The  left ICA/CCA ratio is approximately 2.2 . These findings correlate with  50-69% stenosis of the left internal carotid artery.     Antegrade flow is demonstrated in bilateral vertebral arteries.  There is greater than 50% stenosis in the left external carotid artery.  This report was finalized on 01/12/2021 15:27 by Dr. Markus Carr MD.     Findings:  Left heart catheterization:  Pressures:  1.  LV: 97/14 mmHg  2.  Aortic: 97/56 mmHg     Selective coronary angiography:  1. Left main: The left main is a long vessel trifurcates into the LAD, ramus intermedius, and left circumflex arteries.  There is severe 70 to 80% proximal stenosis and 60 to 70% distal stenosis.  2. Left anterior descending artery: The LAD runs in the interventricular groove giving off 2 diagonals and multiple septal perforators before wrapping around the apex as an apical recurrent branch.  There is a severe 90 to 95% stenosis in the LAD between the first and second diagonal branches.  There is a severe proximal 90 to 95% stenosis of the proximal first diagonal.  3. Ramus intermedius: There is a severe 80 to 90% stenosis in the proximal segment.  4. Left circumflex artery: The left circumflex is nondominant for posterior circulation.  It gives off 1 significant obtuse marginal branch. There is 70 to 80% stenosis severe stenosis of  the proximal circumflex.  5. Right coronary artery: The RCA is dominant for posterior circulation giving rise to PDA and right PL branches.  There is severe 90 to 95% proximal stenosis and 80 to 90% mid RCA stenosis.     Impression:  1.  Severe three-vessel coronary disease as described above.  2.  Very mildly elevated left heart filling pressures.    Echocardiogram Findings    Left Ventricle Left ventricular ejection fraction appears to be 36 - 40%. Left ventricular systolic function is moderately decreased.   Normal left ventricular cavity size and wall thickness noted. Left ventricular diastolic function is consistent with (grade I) impaired relaxation.   Right Ventricle Normal right ventricular cavity size and systolic function noted.   Left Atrium The left atrial cavity is borderline dilated.   Right Atrium Normal right atrial cavity size noted.   Aortic Valve The aortic valve is abnormal in structure. There is mild thickening of the aortic valve. The aortic valve appears trileaflet. No significant aortic valve regurgitation is present. No hemodynamically significant aortic valve stenosis is present.   Mitral Valve The mitral valve is grossly normal in structure. Mild mitral valve regurgitation is present. No significant mitral valve stenosis is present.   Tricuspid Valve The tricuspid valve is structurally normal with no significant stenosis present. Trace tricuspid valve regurgitation is present. Insufficient TR velocity profile to estimate the right ventricular systolic pressure.   Pulmonic Valve The pulmonic valve is not well visualized. There is no significant pulmonic valve regurgitation present. There is no pulmonic valve stenosis present.   Greater Vessels No dilation of the aortic root is present. The inferior vena cava is normally sized. Normal IVC inspiratory collapse of greater than 50% noted.   Pericardium The pericardium is normal. There is no evidence of pericardial effusion. .         Physical  Exam:  General: Alert, oriented. No apparent distress.   Cardiovascular: Regular rate and rhythm without murmur, rubs, or gallops.    Pulmonary: Clear to auscultation bilaterally without wheezing, rubs, or rales.  Abdomen: Soft, non distended, and non tender.  Extremities: Warm, moves all extremities. No edema.   Neurologic:  Grossly intact with no focal deficits.            Impression:  Acute systolic congestive heart failure (CMS/HCC)  NSTEMI (non-ST elevated myocardial infarction) (CMS/HCC)  Acute pulmonary edema (CMS/HCC)  Diabetes mellitus (CMS/HCC)  Hypertension  Hyperlipidemia  Coronary artery disease, multivessel  Aspirin like platelet function defect, day 4 off plavix   Bilateral carotid artery stenosis, asymptomatic      Plan:  Continue to hold Plavix  Repeat echo this weekend to reassess LV function  CABG Monday 1/18/2021   DW patient, wife and nursing       Viky Judd Sarmad, APRN  01/15/21  17:03 CST

## 2021-01-15 NOTE — PROGRESS NOTES
[facility] -  Progress Note     LOS: 7 days   Patient Care Team:  Alfredito Land MD as PCP - General (Family Medicine)    Chief Complaint:CAD follow-up    Subjective     Interval History: Patient sitting on the side of the bed.  He denies any chest pain, chest pressure, chest discomfort.  He denies any shortness of breath, dizziness, lightheadedness.  Telemetry stable, sinus rhythm.    Review of Systems:     Review of Systems   Constitutional: Negative for activity change, appetite change and fatigue.   Respiratory: Negative for chest tightness.    Cardiovascular: Negative for chest pain, palpitations and leg swelling.   Gastrointestinal: Negative for abdominal distention and abdominal pain.   Neurological: Negative for dizziness, weakness and light-headedness.   Psychiatric/Behavioral: Negative for agitation and confusion.     Objective     Vital Sign Min/Max for last 24 hours  Temp  Min: 97.3 °F (36.3 °C)  Max: 98.4 °F (36.9 °C)   BP  Min: 107/50  Max: 137/61   Pulse  Min: 60  Max: 64   Resp  Min: 18  Max: 18   SpO2  Min: 97 %  Max: 100 %   No data recorded   Weight  Min: 85.2 kg (187 lb 14.4 oz)  Max: 85.2 kg (187 lb 14.4 oz)         01/15/21  0336   Weight: 85.2 kg (187 lb 14.4 oz)       Physical Exam:    Vitals signs reviewed.   Constitutional:       Appearance: Healthy appearance.   Neck:      Musculoskeletal: Normal range of motion and neck supple.   Pulmonary:      Effort: Pulmonary effort is normal.      Breath sounds: Normal breath sounds. No wheezing. No rhonchi. No rales.   Chest:      Chest wall: Not tender to palpatation.   Cardiovascular:      Normal rate. Regular rhythm.      Murmurs: There is no murmur.      No gallop.   Edema:     Peripheral edema absent.   Abdominal:      General: Bowel sounds are normal.      Palpations: Abdomen is soft.   Musculoskeletal: Normal range of motion.   Skin:     General: Skin is warm and dry.      Findings: Bruising present.       Comments: Hematoma to right groin firm, bruised, no pain at site.    Neurological:      Mental Status: Alert and oriented to person, place and time.   Psychiatric:         Attention and Perception: Attention normal.         Mood and Affect: Mood normal.         Speech: Speech normal.         Cognition and Memory: Cognition normal.       Results Review:     POC Glucose 1/15/21 229  Lab Results (last 72 hours)     Procedure Component Value Units Date/Time    POC Glucose Once [[id number]]  (Abnormal) Collected: 01/14/21 1102    Specimen: Blood Updated: 01/14/21 1125     Glucose 260 mg/dL      Comment: : [id number] Jerardo EuraisaMeter ID: [id number]       POC Glucose Once [[id number]]  (Abnormal) Collected: 01/14/21 0759    Specimen: Blood Updated: 01/14/21 0822     Glucose 172 mg/dL      Comment: : [id number] Jerardo EuraisaMeter ID: [id number]       Basic Metabolic Panel [[id number]]  (Abnormal) Collected: 01/14/21 0552    Specimen: Blood Updated: 01/14/21 0647     Glucose 182 mg/dL      BUN 20 mg/dL      Creatinine 0.81 mg/dL      Sodium 133 mmol/L      Potassium 4.0 mmol/L      Chloride 96 mmol/L      CO2 26.0 mmol/L      Calcium 9.7 mg/dL      eGFR Non African Amer 93 mL/min/1.73      BUN/Creatinine Ratio 24.7     Anion Gap 11.0 mmol/L     Narrative:      GFR Normal >60  Chronic Kidney Disease <60  Kidney Failure <15      POC Glucose Once [[id number]]  (Abnormal) Collected: 01/13/21 2057    Specimen: Blood Updated: 01/13/21 2110     Glucose 154 mg/dL      Comment: : [id number] Delfino WeinerahMeter ID: [id number]       POC Glucose Once [[id number]]  (Abnormal) Collected: 01/13/21 1628    Specimen: Blood Updated: 01/13/21 1639     Glucose 145 mg/dL      Comment: : [id number] Irma MikiaMeter ID: [id number]       POC Glucose Once [[id number]]  (Abnormal) Collected: 01/13/21 1103    Specimen: Blood Updated: 01/13/21 1120     Glucose 325 mg/dL      Comment: : [id number] Irma MikiaMeter ID: [id number]        Blood Gas, Arterial With Co-Ox [[id number]]  (Abnormal) Collected: 01/13/21 0906    Specimen: Arterial Blood Updated: 01/13/21 0909     Site Left Radial     Reddy's Test Positive     pH, Arterial 7.460 pH units      Comment: 83 Value above reference range        pCO2, Arterial 30.9 mm Hg      Comment: 84 Value below reference range        pO2, Arterial 94.8 mm Hg      HCO3, Arterial 22.0 mmol/L      Base Excess, Arterial -0.9 mmol/L      Comment: 84 Value below reference range        O2 Saturation, Arterial 97.6 %      Hemoglobin, Blood Gas 14.8 g/dL      Hematocrit, Blood Gas 45.4 %      Oxyhemoglobin 96.4 %      Methemoglobin 0.50 %      Carboxyhemoglobin 0.7 %      A-a Gradiant 18.6 mmHg      Temperature 37.0 C      Sodium, Arterial 130 mmol/L      Comment: 84 Value below reference range        Potassium, Arterial 4.3 mmol/L      Barometric Pressure for Blood Gas 753 mmHg      Modality Room Air     Ventilator Mode NA     Note --     Collected by [id number]     Comment: Meter: [id number]     :  [id number]        pH, Temp Corrected 7.460 pH Units      pCO2, Temperature Corrected 30.9 mm Hg      pO2, Temperature Corrected 94.8 mm Hg     POC Glucose Once [[id number]]  (Abnormal) Collected: 01/13/21 0727    Specimen: Blood Updated: 01/13/21 0743     Glucose 183 mg/dL      Comment: : [id number] Irma MikiaMeter ID: [id number]       Blood Culture - Blood, Arm, Left [[id number]] Collected: 01/08/21 0046    Specimen: Blood from Arm, Left Updated: 01/13/21 0100     Blood Culture No growth at 5 days    Blood Culture - Blood, Arm, Right [[id number]] Collected: 01/08/21 0046    Specimen: Blood from Arm, Right Updated: 01/13/21 0100     Blood Culture No growth at 5 days    COVID PRE-OP / PRE-PROCEDURE SCREENING ORDER (NO ISOLATION) - Swab, Nasal Cavity [[id number]]  (Normal) Collected: 01/12/21 1552    Specimen: Swab from Nasal Cavity Updated: 01/12/21 0640    Narrative:      The following orders were created for panel  order COVID PRE-OP / PRE-PROCEDURE SCREENING ORDER (NO ISOLATION) - Swab, Nasal Cavity.  Procedure                               Abnormality         Status                     ---------                               -----------         ------                     COVID-19,[facility]...[[id number]]  Normal              Final result                 Please view results for these tests on the individual orders.    COVID-19,[facility] [employer] IN-HOUSE,Nasal Swab No Transport Media 3-4 HR TAT - Swab, Nasal Cavity [[id number]]  (Normal) Collected: 01/12/21 1552    Specimen: Swab from Nasal Cavity Updated: 01/12/21 1730     COVID19 Not Detected    Narrative:      Fact sheet for providers: https://www.fda.gov/media/762740/download     Fact sheet for patients: https://www.fda.gov/media/365696/download    Test performed by PCR.    POC Glucose Once [[id number]]  (Abnormal) Collected: 01/12/21 1621    Specimen: Blood Updated: 01/12/21 1644     Glucose 146 mg/dL      Comment: : [id number] Jason HeatherMeter ID: [id number]       POC Glucose Once [[id number]]  (Abnormal) Collected: 01/12/21 1041    Specimen: Blood Updated: 01/12/21 1103     Glucose 264 mg/dL      Comment: : [id number] Irma MikiaMeter ID: [id number]       POC Glucose Once [[id number]]  (Abnormal) Collected: 01/12/21 0811    Specimen: Blood Updated: 01/12/21 0827     Glucose 189 mg/dL      Comment: : [id number] Irma MikiaMeter ID: [id number]       Basic Metabolic Panel [[id number]]  (Abnormal) Collected: 01/12/21 0526    Specimen: Blood Updated: 01/12/21 0630     Glucose 174 mg/dL      BUN 18 mg/dL      Creatinine 0.75 mg/dL      Sodium 134 mmol/L      Potassium 4.1 mmol/L      Comment: Slight hemolysis detected by analyzer. Results may be affected.        Chloride 98 mmol/L      CO2 22.0 mmol/L      Calcium 9.8 mg/dL      eGFR Non African Amer 102 mL/min/1.73      BUN/Creatinine Ratio 24.0     Anion Gap 14.0 mmol/L     Narrative:      GFR Normal >60  Chronic  Kidney Disease <60  Kidney Failure <15      CBC (No Diff) [[id number]]  (Normal) Collected: 01/12/21 0526    Specimen: Blood Updated: 01/12/21 0604     WBC 9.89 10*3/mm3      RBC 4.95 10*6/mm3      Hemoglobin 14.0 g/dL      Hematocrit 41.8 %      MCV 84.4 fL      MCH 28.3 pg      MCHC 33.5 g/dL      RDW 12.6 %      RDW-SD 38.4 fl      MPV 11.1 fL      Platelets 229 10*3/mm3     POC Glucose Once [[id number]]  (Abnormal) Collected: 01/11/21 1952    Specimen: Blood Updated: 01/11/21 2022     Glucose 206 mg/dL      Comment: : [id number] Tyler JenniferMeter ID: [id number]       POC Glucose Once [[id number]]  (Abnormal) Collected: 01/11/21 1654    Specimen: Blood Updated: 01/11/21 1706     Glucose 191 mg/dL      Comment: : [id number] Jerardo EuraisaMeter ID: [id number]       Basic Metabolic Panel [[id number]]  (Abnormal) Collected: 01/11/21 1421    Specimen: Blood Updated: 01/11/21 1501     Glucose 159 mg/dL      BUN 18 mg/dL      Creatinine 0.80 mg/dL      Sodium 137 mmol/L      Potassium 4.1 mmol/L      Chloride 98 mmol/L      CO2 26.0 mmol/L      Calcium 10.2 mg/dL      eGFR Non African Amer 95 mL/min/1.73      BUN/Creatinine Ratio 22.5     Anion Gap 13.0 mmol/L     Narrative:      GFR Normal >60  Chronic Kidney Disease <60  Kidney Failure <15      CBC & Differential [[id number]]  (Abnormal) Collected: 01/11/21 1421    Specimen: Blood Updated: 01/11/21 1442    Narrative:      The following orders were created for panel order CBC & Differential.  Procedure                               Abnormality         Status                     ---------                               -----------         ------                     CBC Auto Differential[[id number]]        Abnormal            Final result                 Please view results for these tests on the individual orders.    CBC Auto Differential [[id number]]  (Abnormal) Collected: 01/11/21 1421    Specimen: Blood Updated: 01/11/21 1442     WBC 7.82 10*3/mm3      RBC 5.04  10*6/mm3      Hemoglobin 15.0 g/dL      Hematocrit 42.9 %      MCV 85.1 fL      MCH 29.8 pg      MCHC 35.0 g/dL      RDW 12.8 %      RDW-SD 39.6 fl      MPV 10.5 fL      Platelets 245 10*3/mm3      Neutrophil % 56.5 %      Lymphocyte % 25.2 %      Monocyte % 17.4 %      Eosinophil % 0.1 %      Basophil % 0.5 %      Immature Grans % 0.3 %      Neutrophils, Absolute 4.42 10*3/mm3      Lymphocytes, Absolute 1.97 10*3/mm3      Monocytes, Absolute 1.36 10*3/mm3      Eosinophils, Absolute 0.01 10*3/mm3      Basophils, Absolute 0.04 10*3/mm3      Immature Grans, Absolute 0.02 10*3/mm3      nRBC 0.0 /100 WBC         Medication Review: yes  Current Facility-Administered Medications   Medication Dose Route Frequency Provider Last Rate Last Admin   • acetaminophen (TYLENOL) tablet 650 mg  650 mg Oral Q4H PRN Cade Hong MD       • acetaminophen (TYLENOL) tablet 650 mg  650 mg Oral Q4H PRN Cade Hong MD       • amiodarone (PACERONE) tablet 400 mg  400 mg Oral BID With Meals Reji Wood MD   400 mg at 01/15/21 0837   • amitriptyline (ELAVIL) tablet 100 mg  100 mg Oral Nightly Cade Hong MD   100 mg at 01/14/21 2026   • aspirin chewable tablet 81 mg  81 mg Oral Daily Cade Hong MD   81 mg at 01/15/21 0837   • atorvastatin (LIPITOR) tablet 20 mg  20 mg Oral Daily Reji Wood MD   20 mg at 01/15/21 0837   • carvedilol (COREG) tablet 6.25 mg  6.25 mg Oral BID With Meals Cade Hong MD   6.25 mg at 01/15/21 0837   • dextrose (D50W) 25 g/ 50mL Intravenous Solution 25 g  25 g Intravenous Q15 Min PRN Cade Hong MD       • dextrose (GLUTOSE) oral gel 15 g  15 g Oral Q15 Min PRN Cade Hong MD       • famotidine (PEPCID) tablet 20 mg  20 mg Oral Daily Cade Hong MD   20 mg at 01/15/21 0837   • furosemide (LASIX) tablet 40 mg  40 mg Oral Daily Cade Hong MD   40 mg at 01/15/21 0837   • gabapentin (NEURONTIN) capsule 600 mg  600 mg Oral Q8H Cade Hong MD   600 mg at  01/15/21 0525   • glucagon (human recombinant) (GLUCAGEN DIAGNOSTIC) injection 1 mg  1 mg Subcutaneous Q15 Min PRN Cade Hong MD       • insulin lispro (humaLOG, ADMELOG) injection 0-9 Units  0-9 Units Subcutaneous TID AC Jeannine Yanez APRN   4 Units at 01/15/21 0837   • lisinopril (PRINIVIL,ZESTRIL) tablet 5 mg  5 mg Oral Q24H Cade Hong MD   5 mg at 01/15/21 0837   • ondansetron (ZOFRAN) injection 4 mg  4 mg Intravenous Q6H PRN Cade Hong MD       • potassium chloride (MICRO-K) CR capsule 20 mEq  20 mEq Oral Daily Cade Hong MD   20 mEq at 01/15/21 0837   • sodium chloride 0.9 % flush 10 mL  10 mL Intravenous PRN Cade Hong MD       • sodium chloride 0.9 % flush 10 mL  10 mL Intravenous Q12H Cade Hong MD   10 mL at 01/15/21 0839   • sodium chloride 0.9 % flush 10 mL  10 mL Intravenous PRN Codi Hester PA-C       • sodium chloride 0.9 % infusion  1-3 mL/kg/hr Intravenous Continuous Codi Hester PA-C 100 mL/hr at 01/11/21 1435 1.149 mL/kg/hr at 01/11/21 1435   • tamsulosin (FLOMAX) 24 hr capsule 0.4 mg  0.4 mg Oral Daily Cade Hong MD   0.4 mg at 01/15/21 0837   • traMADol-acetaminophen (ULTRACET) 37.5-325 MG per tablet 1 tablet  1 tablet Oral BID PRN Cade Hong MD   1 tablet at 01/08/21 1504     Results for orders placed during the hospital encounter of 01/07/21   Adult Transthoracic Echo Complete W/ Cont if Necessary Per Protocol    Narrative · Left ventricular ejection fraction appears to be 36 - 40%. Left   ventricular systolic function is moderately decreased.  · The following left ventricular wall segments are hypokinetic: mid   anterior, apical anterior, apical lateral, apical inferior, mid inferior,   apical septal, mid inferoseptal, apex hypokinetic, mid anteroseptal and   basal inferior.  · Left ventricular diastolic function is consistent with (grade I)   impaired relaxation.  · Normal right ventricular cavity size and systolic  function noted.  · There is no significant (greater than mild) valvular dysfunction.        Cardiac catheterization 1/11/2021:  Selective coronary angiography:  1. Left main: The left main is a long vessel trifurcates into the LAD, ramus intermedius, and left circumflex arteries.  There is severe 70 to 80% proximal stenosis and 60 to 70% distal stenosis.  2. Left anterior descending artery: The LAD runs in the interventricular groove giving off 2 diagonals and multiple septal perforators before wrapping around the apex as an apical recurrent branch.  There is a severe 90 to 95% stenosis in the LAD between the first and second diagonal branches.  There is a severe proximal 90 to 95% stenosis of the proximal first diagonal.  3. Ramus intermedius: There is a severe 80 to 90% stenosis in the proximal segment.  4. Left circumflex artery: The left circumflex is nondominant for posterior circulation.  It gives off 1 significant obtuse marginal branch. There is 70 to 80% stenosis severe stenosis of the proximal circumflex.  5. Right coronary artery: The RCA is dominant for posterior circulation giving rise to PDA and right PL branches.  There is severe 90 to 95% proximal stenosis and 80 to 90% mid RCA stenosis.     Impression:  1.  Severe three-vessel coronary disease as described above.  2.  Very mildly elevated left heart filling pressures.    Assessment/Plan     - NSTEMI    - Coronary artery disease: Severe three-vessel disease noted on cardiac catheterization 1/11/2021.  Evaluated by cardiothoracic surgery with plans for coronary artery bypass grafting during current inpatient stay .  Continue aspirin, statin.  Plavix has been discontinued.    - Acute systolic congestive heart failure: The patient was initially symptomatic with shortness of breath and treated with IV diuretics.  Now asymptomatic and euvolemic on exam. On oral lasix (transitioned from IV) and indapamide (home med) with mildly low Na. Indapamide stopped  1/14.    Hyponatremia: mildly low. Pt is on indapamide at home which was continued on admission. He has also been transitioned to a loop diuretic which he was not on at home. Continue oral furosemide, indapamide was discontinued yesterday. BMP in am.      - Ischemic cardiomyopathy: EF 35 to 40% on most recent echo referenced above continue with guideline directed medical therapy including his current regimen Coreg, lisinopril, and oral Lasix.  Of note, he was ramipril at home as well as amlodipine.  His amlodipine has been discontinued and lisinopril was substituted for ramipril.  Carvedilol has been added as a new medication during his hospitalization.  He was not on beta-blocker therapy at home.    -Hypertension: Well-controlled on current medications.    - Mixed hyperlipidemia: On statin therapy with atorvastatin 20 mg. Home med was simvastatin 40mg.  LDL and lipid panel during current hospitalization was 82.  LDL goal due to  findings of coronary artery disease would be <70. Would recommend the patient to be on high intensity statin at discharge.    - Diabetes Mellitus: Hgb A1c 7.10% home medications include metformin    Tentative plans for CABG 1/18/2021        Electronically signed by ANGEL Phipps, 01/15/21, 8:50 AM CST.

## 2021-01-15 NOTE — ANESTHESIA PREPROCEDURE EVALUATION
Anesthesia Evaluation     Patient summary reviewed and Nursing notes reviewed   no history of anesthetic complications:  NPO Solid Status: > 8 hours  NPO Liquid Status: > 8 hours           Airway   Mallampati: III  TM distance: >3 FB  Neck ROM: full  Dental    (+) upper dentures and lower dentures    Pulmonary    (-) not a smoker  Cardiovascular   Exercise tolerance: poor (<4 METS)    ECG reviewed    (+) hypertension, past MI , CAD, CHF , hyperlipidemia,  carotid artery disease (<50% right, 50-69% left, on plavix)    ROS comment:   Admitted 1/8 with dyspnea, found to have NSTEMI    Echo 1/8/21  · Left ventricular ejection fraction appears to be 36 - 40%. Left ventricular systolic function is moderately decreased.  · The following left ventricular wall segments are hypokinetic: mid anterior, apical anterior, apical lateral, apical inferior, mid inferior, apical septal, mid inferoseptal, apex hypokinetic, mid anteroseptal and basal inferior.  · Left ventricular diastolic function is consistent with (grade I) impaired relaxation.  · Normal right ventricular cavity size and systolic function noted.  · There is no significant (greater than mild) valvular dysfunction.    Cath 1/11/21  Selective coronary angiography:  1. Left main: The left main is a long vessel trifurcates into the LAD, ramus intermedius, and left circumflex arteries.  There is severe 70 to 80% proximal stenosis and 60 to 70% distal stenosis.  2. Left anterior descending artery: The LAD runs in the interventricular groove giving off 2 diagonals and multiple septal perforators before wrapping around the apex as an apical recurrent branch.  There is a severe 90 to 95% stenosis in the LAD between the first and second diagonal branches.  There is a severe proximal 90 to 95% stenosis of the proximal first diagonal.  3. Ramus intermedius: There is a severe 80 to 90% stenosis in the proximal segment.  4. Left circumflex artery: The left circumflex is  nondominant for posterior circulation.  It gives off 1 significant obtuse marginal branch. There is 70 to 80% stenosis severe stenosis of the proximal circumflex.  5. Right coronary artery: The RCA is dominant for posterior circulation giving rise to PDA and right PL branches.  There is severe 90 to 95% proximal stenosis and 80 to 90% mid RCA stenosis.    Neuro/Psych  GI/Hepatic/Renal/Endo    (+)   diabetes mellitus type 2,     Musculoskeletal     Abdominal    Substance History      OB/GYN          Other                      Anesthesia Plan    ASA 4     general   (No c/i to HETAL)  intravenous induction     Anesthetic plan, all risks, benefits, and alternatives have been provided, discussed and informed consent has been obtained with: patient.

## 2021-01-16 PROBLEM — E87.1 HYPONATREMIA: Status: ACTIVE | Noted: 2021-01-16

## 2021-01-16 LAB
ABO GROUP BLD: NORMAL
ANION GAP SERPL CALCULATED.3IONS-SCNC: 10 MMOL/L (ref 5–15)
BLD GP AB SCN SERPL QL: NEGATIVE
BUN SERPL-MCNC: 19 MG/DL (ref 8–23)
BUN/CREAT SERPL: 21.3 (ref 7–25)
CALCIUM SPEC-SCNC: 9.5 MG/DL (ref 8.6–10.5)
CHLORIDE SERPL-SCNC: 94 MMOL/L (ref 98–107)
CO2 SERPL-SCNC: 26 MMOL/L (ref 22–29)
CREAT SERPL-MCNC: 0.89 MG/DL (ref 0.76–1.27)
GFR SERPL CREATININE-BSD FRML MDRD: 84 ML/MIN/1.73
GLUCOSE BLDC GLUCOMTR-MCNC: 153 MG/DL (ref 70–130)
GLUCOSE BLDC GLUCOMTR-MCNC: 198 MG/DL (ref 70–130)
GLUCOSE BLDC GLUCOMTR-MCNC: 262 MG/DL (ref 70–130)
GLUCOSE BLDC GLUCOMTR-MCNC: 271 MG/DL (ref 70–130)
GLUCOSE SERPL-MCNC: 202 MG/DL (ref 65–99)
POTASSIUM SERPL-SCNC: 4 MMOL/L (ref 3.5–5.2)
RH BLD: NEGATIVE
SODIUM SERPL-SCNC: 130 MMOL/L (ref 136–145)
T&S EXPIRATION DATE: NORMAL

## 2021-01-16 PROCEDURE — 86900 BLOOD TYPING SEROLOGIC ABO: CPT | Performed by: NURSE PRACTITIONER

## 2021-01-16 PROCEDURE — 82962 GLUCOSE BLOOD TEST: CPT

## 2021-01-16 PROCEDURE — 63710000001 INSULIN LISPRO (HUMAN) PER 5 UNITS: Performed by: NURSE PRACTITIONER

## 2021-01-16 PROCEDURE — 86901 BLOOD TYPING SEROLOGIC RH(D): CPT | Performed by: NURSE PRACTITIONER

## 2021-01-16 PROCEDURE — 86901 BLOOD TYPING SEROLOGIC RH(D): CPT

## 2021-01-16 PROCEDURE — 86900 BLOOD TYPING SEROLOGIC ABO: CPT

## 2021-01-16 PROCEDURE — 80048 BASIC METABOLIC PNL TOTAL CA: CPT | Performed by: NURSE PRACTITIONER

## 2021-01-16 PROCEDURE — 99232 SBSQ HOSP IP/OBS MODERATE 35: CPT | Performed by: NURSE PRACTITIONER

## 2021-01-16 PROCEDURE — 63710000001 INSULIN DETEMIR PER 5 UNITS: Performed by: INTERNAL MEDICINE

## 2021-01-16 PROCEDURE — 86850 RBC ANTIBODY SCREEN: CPT | Performed by: NURSE PRACTITIONER

## 2021-01-16 PROCEDURE — 86920 COMPATIBILITY TEST SPIN: CPT

## 2021-01-16 RX ORDER — POLYETHYLENE GLYCOL 3350 17 G/17G
17 POWDER, FOR SOLUTION ORAL DAILY
Status: DISCONTINUED | OUTPATIENT
Start: 2021-01-16 | End: 2021-01-18 | Stop reason: HOSPADM

## 2021-01-16 RX ORDER — DOCUSATE SODIUM 100 MG/1
100 CAPSULE, LIQUID FILLED ORAL 2 TIMES DAILY
Status: DISCONTINUED | OUTPATIENT
Start: 2021-01-16 | End: 2021-01-18 | Stop reason: HOSPADM

## 2021-01-16 RX ORDER — BISACODYL 5 MG/1
10 TABLET, DELAYED RELEASE ORAL DAILY PRN
Status: DISCONTINUED | OUTPATIENT
Start: 2021-01-16 | End: 2021-01-18 | Stop reason: HOSPADM

## 2021-01-16 RX ADMIN — POTASSIUM CHLORIDE 20 MEQ: 750 CAPSULE, EXTENDED RELEASE ORAL at 08:52

## 2021-01-16 RX ADMIN — POLYETHYLENE GLYCOL 3350 17 G: 17 POWDER, FOR SOLUTION ORAL at 17:02

## 2021-01-16 RX ADMIN — AMIODARONE HYDROCHLORIDE 400 MG: 200 TABLET ORAL at 08:52

## 2021-01-16 RX ADMIN — AMIODARONE HYDROCHLORIDE 400 MG: 200 TABLET ORAL at 17:03

## 2021-01-16 RX ADMIN — LISINOPRIL 5 MG: 5 TABLET ORAL at 08:52

## 2021-01-16 RX ADMIN — FAMOTIDINE 20 MG: 20 TABLET, FILM COATED ORAL at 08:52

## 2021-01-16 RX ADMIN — INSULIN DETEMIR 10 UNITS: 100 INJECTION, SOLUTION SUBCUTANEOUS at 21:57

## 2021-01-16 RX ADMIN — INSULIN LISPRO 6 UNITS: 100 INJECTION, SOLUTION INTRAVENOUS; SUBCUTANEOUS at 12:05

## 2021-01-16 RX ADMIN — ASPIRIN 81 MG: 81 TABLET, CHEWABLE ORAL at 08:52

## 2021-01-16 RX ADMIN — SODIUM CHLORIDE, PRESERVATIVE FREE 10 ML: 5 INJECTION INTRAVENOUS at 08:55

## 2021-01-16 RX ADMIN — TAMSULOSIN HYDROCHLORIDE 0.4 MG: 0.4 CAPSULE ORAL at 08:52

## 2021-01-16 RX ADMIN — FUROSEMIDE 40 MG: 40 TABLET ORAL at 08:52

## 2021-01-16 RX ADMIN — GABAPENTIN 600 MG: 300 CAPSULE ORAL at 14:07

## 2021-01-16 RX ADMIN — INSULIN LISPRO 2 UNITS: 100 INJECTION, SOLUTION INTRAVENOUS; SUBCUTANEOUS at 08:50

## 2021-01-16 RX ADMIN — DOCUSATE SODIUM 100 MG: 100 CAPSULE ORAL at 21:55

## 2021-01-16 RX ADMIN — GABAPENTIN 600 MG: 300 CAPSULE ORAL at 21:56

## 2021-01-16 RX ADMIN — AMITRIPTYLINE HYDROCHLORIDE 100 MG: 25 TABLET, FILM COATED ORAL at 21:55

## 2021-01-16 RX ADMIN — GABAPENTIN 600 MG: 300 CAPSULE ORAL at 05:45

## 2021-01-16 RX ADMIN — CARVEDILOL 6.25 MG: 6.25 TABLET, FILM COATED ORAL at 17:03

## 2021-01-16 RX ADMIN — INSULIN LISPRO 2 UNITS: 100 INJECTION, SOLUTION INTRAVENOUS; SUBCUTANEOUS at 17:03

## 2021-01-16 RX ADMIN — ATORVASTATIN CALCIUM 20 MG: 10 TABLET, FILM COATED ORAL at 08:52

## 2021-01-16 RX ADMIN — SODIUM CHLORIDE, PRESERVATIVE FREE 10 ML: 5 INJECTION INTRAVENOUS at 21:56

## 2021-01-16 RX ADMIN — CARVEDILOL 6.25 MG: 6.25 TABLET, FILM COATED ORAL at 08:52

## 2021-01-16 NOTE — PROGRESS NOTES
[facility] -  Progress Note     LOS: 8 days   Patient Care Team:  Alfredito Land MD as PCP - General (Family Medicine)    Chief Complaint: Shortness of breath on admission    Subjective     Interval History:     Patient Complaints: The patient continues to feel well overall.  He denies any chest pain.  Shortness of breath is resolved.  He denies any edema.  He denies any orthopnea or PND.  He is maintaining normal sinus rhythm with heart rates in the 50s to 80s on telemetry.  Blood pressures remain well controlled.          Review of Systems:     Review of Systems   Constitutional: Negative for diaphoresis, fatigue, fever and unexpected weight change.   HENT: Negative for nosebleeds.    Respiratory: Negative for apnea, cough, chest tightness, shortness of breath and wheezing.    Cardiovascular: Negative for chest pain, palpitations and leg swelling.   Gastrointestinal: Negative for abdominal distention, nausea and vomiting.   Genitourinary: Negative for hematuria.   Musculoskeletal: Negative for gait problem.   Skin: Negative for color change.   Neurological: Negative for dizziness, syncope, weakness and light-headedness.     Objective     Vital Sign Min/Max for last 24 hours  Temp  Min: 97.4 °F (36.3 °C)  Max: 98.8 °F (37.1 °C)   BP  Min: 107/55  Max: 142/62   Pulse  Min: 60  Max: 68   Resp  Min: 14  Max: 18   SpO2  Min: 97 %  Max: 98 %   No data recorded   Weight  Min: 84.9 kg (187 lb 3.2 oz)  Max: 84.9 kg (187 lb 3.2 oz)         01/15/21  2048   Weight: 84.9 kg (187 lb 3.2 oz)       Physical Exam:    Constitutional:       General: Not in acute distress.     Appearance: Well-developed. Not diaphoretic.   Eyes:      Pupils: Pupils are equal, round, and reactive to light.   HENT:      Head: Normocephalic and atraumatic.   Neck:      Musculoskeletal: Normal range of motion and neck supple.      Vascular: No JVD.   Pulmonary:      Effort: Pulmonary effort is normal. No respiratory  distress.      Breath sounds: Normal breath sounds.   Chest:      Chest wall: Not tender to palpatation.   Cardiovascular:      Normal rate. Regular rhythm.      Murmurs: There is no murmur.   Edema:     Peripheral edema absent.   Abdominal:      General: Bowel sounds are normal. There is no distension.      Palpations: Abdomen is soft.      Tenderness: There is no abdominal tenderness.   Musculoskeletal: Normal range of motion.   Skin:     General: Skin is warm and dry.   Neurological:      Mental Status: Alert and oriented to person, place, and time.      Cranial Nerves: No cranial nerve deficit.   Psychiatric:         Behavior: Behavior normal.       Results Review:   Lab Results (last 72 hours)     Procedure Component Value Units Date/Time    POC Glucose Once [[id number]]  (Abnormal) Collected: 01/13/21 1103    Specimen: Blood Updated: 01/13/21 1120     Glucose 325 mg/dL      Comment: : [id number] Irma FernandeziaMeter ID: [id number]       Blood Gas, Arterial With Co-Ox [[id number]]  (Abnormal) Collected: 01/13/21 0906    Specimen: Arterial Blood Updated: 01/13/21 0909     Site Left Radial     Reddy's Test Positive     pH, Arterial 7.460 pH units      Comment: 83 Value above reference range        pCO2, Arterial 30.9 mm Hg      Comment: 84 Value below reference range        pO2, Arterial 94.8 mm Hg      HCO3, Arterial 22.0 mmol/L      Base Excess, Arterial -0.9 mmol/L      Comment: 84 Value below reference range        O2 Saturation, Arterial 97.6 %      Hemoglobin, Blood Gas 14.8 g/dL      Hematocrit, Blood Gas 45.4 %      Oxyhemoglobin 96.4 %      Methemoglobin 0.50 %      Carboxyhemoglobin 0.7 %      A-a Gradiant 18.6 mmHg      Temperature 37.0 C      Sodium, Arterial 130 mmol/L      Comment: 84 Value below reference range        Potassium, Arterial 4.3 mmol/L      Barometric Pressure for Blood Gas 753 mmHg      Modality Room Air     Ventilator Mode NA     Note --     Collected by [id number]     Comment: Meter:  [id number]     :  [id number]        pH, Temp Corrected 7.460 pH Units      pCO2, Temperature Corrected 30.9 mm Hg      pO2, Temperature Corrected 94.8 mm Hg     POC Glucose Once [[id number]]  (Abnormal) Collected: 01/13/21 0727    Specimen: Blood Updated: 01/13/21 0743     Glucose 183 mg/dL      Comment: : [id number] Irma MikiaMeter ID: [id number]       Blood Culture - Blood, Arm, Left [[id number]] Collected: 01/08/21 0046    Specimen: Blood from Arm, Left Updated: 01/13/21 0100     Blood Culture No growth at 5 days    Blood Culture - Blood, Arm, Right [[id number]] Collected: 01/08/21 0046    Specimen: Blood from Arm, Right Updated: 01/13/21 0100     Blood Culture No growth at 5 days    COVID PRE-OP / PRE-PROCEDURE SCREENING ORDER (NO ISOLATION) - Swab, Nasal Cavity [[id number]]  (Normal) Collected: 01/12/21 1552    Specimen: Swab from Nasal Cavity Updated: 01/12/21 1730    Narrative:      The following orders were created for panel order COVID PRE-OP / PRE-PROCEDURE SCREENING ORDER (NO ISOLATION) - Swab, Nasal Cavity.  Procedure                               Abnormality         Status                     ---------                               -----------         ------                     COVID-19,[facility]...[[id number]]  Normal              Final result                 Please view results for these tests on the individual orders.    COVID-19,[facility] IN-HOUSE,Nasal Swab No Transport Media 3-4 HR TAT - Swab, Nasal Cavity [[id number]]  (Normal) Collected: 01/12/21 1552    Specimen: Swab from Nasal Cavity Updated: 01/12/21 1730     COVID19 Not Detected    Narrative:      Fact sheet for providers: https://www.fda.gov/media/068023/download     Fact sheet for patients: https://www.fda.gov/media/037297/download    Test performed by PCR.    POC Glucose Once [[id number]]  (Abnormal) Collected: 01/12/21 1621    Specimen: Blood Updated: 01/12/21 1644     Glucose 146 mg/dL      Comment: : [id number]  Jason HeatherMeter ID: [id number]       POC Glucose Once [[id number]]  (Abnormal) Collected: 01/12/21 1041    Specimen: Blood Updated: 01/12/21 1103     Glucose 264 mg/dL      Comment: : [id number] Irma MikiaMeter ID: [id number]       POC Glucose Once [[id number]]  (Abnormal) Collected: 01/12/21 0811    Specimen: Blood Updated: 01/12/21 0827     Glucose 189 mg/dL      Comment: : [id number] Irma MikiaMeter ID: [id number]       Basic Metabolic Panel [[id number]]  (Abnormal) Collected: 01/12/21 0526    Specimen: Blood Updated: 01/12/21 0630     Glucose 174 mg/dL      BUN 18 mg/dL      Creatinine 0.75 mg/dL      Sodium 134 mmol/L      Potassium 4.1 mmol/L      Comment: Slight hemolysis detected by analyzer. Results may be affected.        Chloride 98 mmol/L      CO2 22.0 mmol/L      Calcium 9.8 mg/dL      eGFR Non African Amer 102 mL/min/1.73      BUN/Creatinine Ratio 24.0     Anion Gap 14.0 mmol/L     Narrative:      GFR Normal >60  Chronic Kidney Disease <60  Kidney Failure <15      CBC (No Diff) [[id number]]  (Normal) Collected: 01/12/21 0526    Specimen: Blood Updated: 01/12/21 0604     WBC 9.89 10*3/mm3      RBC 4.95 10*6/mm3      Hemoglobin 14.0 g/dL      Hematocrit 41.8 %      MCV 84.4 fL      MCH 28.3 pg      MCHC 33.5 g/dL      RDW 12.6 %      RDW-SD 38.4 fl      MPV 11.1 fL      Platelets 229 10*3/mm3     POC Glucose Once [[id number]]  (Abnormal) Collected: 01/11/21 1952    Specimen: Blood Updated: 01/11/21 2022     Glucose 206 mg/dL      Comment: : [id number] Tyler CohenferMeter ID: [id number]       POC Glucose Once [[id number]]  (Abnormal) Collected: 01/11/21 1654    Specimen: Blood Updated: 01/11/21 1706     Glucose 191 mg/dL      Comment: : [id number] Jerardo HorowitzaisaMeter ID: [id number]       Basic Metabolic Panel [[id number]]  (Abnormal) Collected: 01/11/21 1421    Specimen: Blood Updated: 01/11/21 1501     Glucose 159 mg/dL      BUN 18 mg/dL      Creatinine 0.80 mg/dL      Sodium 137 mmol/L       Potassium 4.1 mmol/L      Chloride 98 mmol/L      CO2 26.0 mmol/L      Calcium 10.2 mg/dL      eGFR Non African Amer 95 mL/min/1.73      BUN/Creatinine Ratio 22.5     Anion Gap 13.0 mmol/L     Narrative:      GFR Normal >60  Chronic Kidney Disease <60  Kidney Failure <15      CBC & Differential [[id number]]  (Abnormal) Collected: 01/11/21 1421    Specimen: Blood Updated: 01/11/21 1442    Narrative:      The following orders were created for panel order CBC & Differential.  Procedure                               Abnormality         Status                     ---------                               -----------         ------                     CBC Auto Differential[[id number]]        Abnormal            Final result                 Please view results for these tests on the individual orders.    CBC Auto Differential [[id number]]  (Abnormal) Collected: 01/11/21 1421    Specimen: Blood Updated: 01/11/21 1442     WBC 7.82 10*3/mm3      RBC 5.04 10*6/mm3      Hemoglobin 15.0 g/dL      Hematocrit 42.9 %      MCV 85.1 fL      MCH 29.8 pg      MCHC 35.0 g/dL      RDW 12.8 %      RDW-SD 39.6 fl      MPV 10.5 fL      Platelets 245 10*3/mm3      Neutrophil % 56.5 %      Lymphocyte % 25.2 %      Monocyte % 17.4 %      Eosinophil % 0.1 %      Basophil % 0.5 %      Immature Grans % 0.3 %      Neutrophils, Absolute 4.42 10*3/mm3      Lymphocytes, Absolute 1.97 10*3/mm3      Monocytes, Absolute 1.36 10*3/mm3      Eosinophils, Absolute 0.01 10*3/mm3      Basophils, Absolute 0.04 10*3/mm3      Immature Grans, Absolute 0.02 10*3/mm3      nRBC 0.0 /100 WBC     Magnesium [[id number]]  (Normal) Collected: 01/11/21 0428    Specimen: Blood Updated: 01/11/21 0825     Magnesium 1.9 mg/dL     POC Glucose Once [[id number]]  (Abnormal) Collected: 01/11/21 0715    Specimen: Blood Updated: 01/11/21 0729     Glucose 224 mg/dL      Comment: : [id number] Sly LadLake Region HospitalaMeter ID: [id number]       Basic Metabolic Panel [[id number]]  (Abnormal)  Collected: 01/11/21 0428    Specimen: Blood Updated: 01/11/21 0539     Glucose 176 mg/dL      BUN 20 mg/dL      Creatinine 0.70 mg/dL      Sodium 136 mmol/L      Potassium 4.0 mmol/L      Chloride 100 mmol/L      CO2 23.0 mmol/L      Calcium 9.7 mg/dL      eGFR Non African Amer 111 mL/min/1.73      BUN/Creatinine Ratio 28.6     Anion Gap 13.0 mmol/L     Narrative:      GFR Normal >60  Chronic Kidney Disease <60  Kidney Failure <15      POC Glucose Once [[id number]]  (Abnormal) Collected: 01/10/21 2021    Specimen: Blood Updated: 01/10/21 2039     Glucose 181 mg/dL      Comment: : [id number] Lucinda IveyitaMeter ID: [id number]       POC Glucose Once [[id number]]  (Normal) Collected: 01/10/21 1550    Specimen: Blood Updated: 01/10/21 1601     Glucose 121 mg/dL      Comment: : [id number] KIZZYandrea FitollianMeter ID: [id number]                 Echo EF Estimated  No results found for: ECHOEFEST      Cath Ejection Fraction Quantitative  No results found for: CATHEF        Medication Review: yes  Current Facility-Administered Medications   Medication Dose Route Frequency Provider Last Rate Last Admin   • acetaminophen (TYLENOL) tablet 650 mg  650 mg Oral Q4H PRN Cade Hong MD       • acetaminophen (TYLENOL) tablet 650 mg  650 mg Oral Q4H PRN Cade Hong MD       • amiodarone (PACERONE) tablet 400 mg  400 mg Oral BID With Meals Reji Wood MD   400 mg at 01/16/21 0852   • amitriptyline (ELAVIL) tablet 100 mg  100 mg Oral Nightly Cade Hong MD   100 mg at 01/15/21 2031   • aspirin chewable tablet 81 mg  81 mg Oral Daily Cade Hong MD   81 mg at 01/16/21 0852   • atorvastatin (LIPITOR) tablet 20 mg  20 mg Oral Daily Reji Wood MD   20 mg at 01/16/21 0852   • carvedilol (COREG) tablet 6.25 mg  6.25 mg Oral BID With Meals Cade Hong MD   6.25 mg at 01/16/21 0852   • dextrose (D50W) 25 g/ 50mL Intravenous Solution 25 g  25 g Intravenous Q15 Min JULION Cade Hong MD       • dextrose  (GLUTOSE) oral gel 15 g  15 g Oral Q15 Min PRN Cade Hong MD       • docusate sodium (COLACE) capsule 100 mg  100 mg Oral BID Jossie Lugo APRN       • famotidine (PEPCID) tablet 20 mg  20 mg Oral Daily Cade Hong MD   20 mg at 01/16/21 0852   • furosemide (LASIX) tablet 40 mg  40 mg Oral Daily aCde Hong MD   40 mg at 01/16/21 0852   • gabapentin (NEURONTIN) capsule 600 mg  600 mg Oral Q8H Cade Hong MD   600 mg at 01/16/21 0545   • glucagon (human recombinant) (GLUCAGEN DIAGNOSTIC) injection 1 mg  1 mg Subcutaneous Q15 Min PRN Cade Hong MD       • insulin lispro (humaLOG, ADMELOG) injection 0-9 Units  0-9 Units Subcutaneous TID AC Jeannine Yanez APRN   6 Units at 01/16/21 1205   • lisinopril (PRINIVIL,ZESTRIL) tablet 5 mg  5 mg Oral Q24H Cade Hong MD   5 mg at 01/16/21 0852   • ondansetron (ZOFRAN) injection 4 mg  4 mg Intravenous Q6H PRN Cade Hong MD       • potassium chloride (MICRO-K) CR capsule 20 mEq  20 mEq Oral Daily Cade Hong MD   20 mEq at 01/16/21 0852   • sodium chloride 0.9 % flush 10 mL  10 mL Intravenous PRN Cade Hong MD       • sodium chloride 0.9 % flush 10 mL  10 mL Intravenous Q12H Cade Hong MD   10 mL at 01/16/21 0855   • sodium chloride 0.9 % flush 10 mL  10 mL Intravenous PRN Codi Hester PA-C       • sodium chloride 0.9 % infusion  1-3 mL/kg/hr Intravenous Continuous Codi Hester PA-C 100 mL/hr at 01/11/21 1435 1.149 mL/kg/hr at 01/11/21 1435   • tamsulosin (FLOMAX) 24 hr capsule 0.4 mg  0.4 mg Oral Daily Cade Hong MD   0.4 mg at 01/16/21 0852         Assessment/Plan     -Acute systolic congestive heart failure/ischemic cardiomyopathy; now euvolemic on exam  -Non-STEMI  -Coronary artery disease with severe three-vessel CAD  -Diabetes mellitus type 2  -Carotid artery disease  -Essential hypertension  -Mixed hyperlipidemia  -Hyponatremia- 130   -Constipation-the patient reports he has not had a bowel  movement in 2 days and this is unusual for him- will order stool softener- colace     Plan-  Continue current medical therapy including aspirin, atorvastatin, Coreg, oral furosemide and lisinopril.  Indapamide discontinued on 1/14/2021  Monitor volume status closely  Oral amiodarone initiated per CT surgery  Plans for CABG on Monday, 1/18/2021, after Plavix is out of his system.  Given his non-STEMI and severity of CAD, he will remain hospitalized until he has CABG.  Monitor telemetry.  Repeat echo this weekend to reassess LV function per CT surgery    Jossie Lugo, APRN  01/16/21  12:50 CST

## 2021-01-16 NOTE — PROGRESS NOTES
[facility] Medicine Services  INPATIENT PROGRESS NOTE    Patient Name: Wesley Eduardo  Date of Admission: 1/7/2021  Today's Date: 01/16/21  Length of Stay: 8  Primary Care Physician: Alfredito Land MD    Subjective   Chief Complaint: Awaiting CABG.   HPI   Doing well with no new complaints other than he needs to have a bowel movement.  He is requesting some medication to help this happen.  He has been doing his walking today.    He had questions about whether or not to take his second Moderna vaccine on 2/3.  Obviously, he will have his bypass surgery between doses.  I curb sided infectious disease and they felt that his vaccination regimen should not be interrupted and less cardiothoracic surgery had a very good reason to wait.  The patient is eager to take the second vaccine.    Review of Systems  All pertinent negatives and positives are as above. All other systems have been reviewed and are negative unless otherwise stated.     Objective    Temp:  [97.4 °F (36.3 °C)-98.8 °F (37.1 °C)] 98.3 °F (36.8 °C)  Heart Rate:  [60-68] 60  Resp:  [14-18] 16  BP: (107-142)/(55-67) 114/57  Physical Exam   Vitals signs reviewed.   Constitutional:       General: He is not in acute distress.     Appearance: He is not toxic-appearing.      Comments: Sitting up in the chair. His wife is present with him. Discussed with his nurse, Hallie   HENT:      Head: Normocephalic and atraumatic.      Mouth/Throat:      Mouth: Mucous membranes are moist.      Pharynx: Oropharynx is clear.   Eyes:      Extraocular Movements: Extraocular movements intact.      Conjunctiva/sclera: Conjunctivae normal.      Pupils: Pupils are equal, round, and reactive to light.   Neck:      Musculoskeletal: Normal range of motion and neck supple. No muscular tenderness.   Cardiovascular:      Rate and Rhythm: Normal rate and regular rhythm.      Pulses: Normal pulses.      Heart sounds: No murmur.      Comments: Sinus  rhythm.   Pulmonary: On room air.      Effort: Pulmonary effort is normal.      Breath sounds: Normal breath sounds. No wheezing.   Abdominal:      General: Bowel sounds are normal. There is no distension.      Palpations: Abdomen is soft.      Tenderness: There is no abdominal tenderness.   Musculoskeletal: Normal range of motion.         General: No swelling or tenderness.   Skin:     General: Skin is warm and dry.      Findings: No erythema or rash.   Neurological:      General: No focal deficit present.      Mental Status: He is alert and oriented to person, place, and time.      Cranial Nerves: No cranial nerve deficit.      Motor: No weakness.   Psychiatric:         Mood and Affect: Mood normal.         Behavior: Behavior normal.     Results Review:  I have reviewed the labs, radiology results, and diagnostic studies.    Laboratory Data:   Results from last 7 days   Lab Units 01/16/21  0507 01/14/21  0552 01/13/21  0906 01/12/21  0526   SODIUM mmol/L 130* 133*  --  134*   SODIUM, ARTERIAL mmol/L  --   --  130*  --    POTASSIUM mmol/L 4.0 4.0  --  4.1   CHLORIDE mmol/L 94* 96*  --  98   CO2 mmol/L 26.0 26.0  --  22.0   BUN mg/dL 19 20  --  18   CREATININE mg/dL 0.89 0.81  --  0.75*   CALCIUM mg/dL 9.5 9.7  --  9.8   GLUCOSE mg/dL 202* 182*  --  174*     I have reviewed the patient's current medications.     Assessment/Plan     Active Hospital Problems    Diagnosis   • **Acute systolic congestive heart failure (CMS/[facility])   • NSTEMI (non-ST elevated myocardial infarction) (CMS/[facility])   • 3-vessel coronary artery disease   • Type 2 diabetes mellitus, without long-term current use of insulin (CMS/[facility])   • Hypertension   • Hyperlipidemia   • Hyponatremia     Plan:  1. The patient was transferred from [facility] on 1/7 for complaints of shortness of breath.  He was found to have a NSTEMI was transferred to our facility for higher level of care. He was also found to be hypoxic and tachypneic.  CTA of the  chest negative for pulmonary embolus.  Concern for fluid overload with bilateral small pleural effusions.  ProBNP 1,608.  He was started on diruesis.  2.  Cardiology evaluated the patient.  Echo on 1/8 showed ejection fraction of 30 to 40% with grade 1 diastolic dysfunction.  Lasix was transitioned to oral on 1/10.  Continue aspirin, beta-blocker, ACE-I and statin.  Cardiac catheterization on 1/11 showed severe three-vessel coronary disease.    3.  CT surgery evaluated patient.  Last dose of Plavix is on 1/11.  Oral amiodarone initiated per CT surgery. CABG planned on Monday, 1/18.   3.  A1c 7.1.  Moderate dose sliding scale insulin.  Metformin on hold.  Start Levemir to improve his glycemic control.  4.  Monitor hyponatremia.  5.  Encourage ambulation as tolerated.  Incentive spirometry.  6.  Bowel regimen.     Discharge Planning: Will be per CTS after his CABG on Monday, 1/18.      Electronically signed by Rolando Simental DO, 01/16/21, 15:06 CST.

## 2021-01-16 NOTE — PLAN OF CARE
Goal Outcome Evaluation:  Plan of Care Reviewed With: patient  Progress: no change  Outcome Summary: Patient waiting for CABG surgery Monday.  NO c/o pain voiced.  VSS.  NSR tele 63-70. No falls noted.  Hematoma nontender to right groin.

## 2021-01-16 NOTE — PLAN OF CARE
Goal Outcome Evaluation:  Plan of Care Reviewed With: patient  Progress: no change  Outcome Summary: Patient has had no complaints of pain or soa this shift.  Has walked multiple times in the howe to nurses station without difficulty.  Pre op teaching for CABG completed over the last two days.  NSR on tele.  Noted a firm hematoma that is non tender to right groin on am assessment.

## 2021-01-16 NOTE — PLAN OF CARE
Goal Outcome Evaluation:  Plan of Care Reviewed With: patient  Progress: no change   Pt ambulates in howe with spouse. Tolerates activity well. VSS, waiting for surgery on Monday. Needs met. Will monitor

## 2021-01-17 ENCOUNTER — APPOINTMENT (OUTPATIENT)
Dept: CARDIOLOGY | Facility: HOSPITAL | Age: 76
End: 2021-01-17

## 2021-01-17 LAB
ANION GAP SERPL CALCULATED.3IONS-SCNC: 8 MMOL/L (ref 5–15)
BH CV ECHO MEAS - AO MAX PG (FULL): 2 MMHG
BH CV ECHO MEAS - AO MAX PG: 5.4 MMHG
BH CV ECHO MEAS - AO MEAN PG (FULL): 0 MMHG
BH CV ECHO MEAS - AO MEAN PG: 2 MMHG
BH CV ECHO MEAS - AO V2 MAX: 116 CM/SEC
BH CV ECHO MEAS - AO V2 MEAN: 60.8 CM/SEC
BH CV ECHO MEAS - AO V2 VTI: 17.5 CM
BH CV ECHO MEAS - BSA(HAYCOCK): 2 M^2
BH CV ECHO MEAS - BSA: 2 M^2
BH CV ECHO MEAS - BZI_BMI: 27.6 KILOGRAMS/M^2
BH CV ECHO MEAS - BZI_METRIC_HEIGHT: 175.3 CM
BH CV ECHO MEAS - BZI_METRIC_WEIGHT: 84.8 KG
BH CV ECHO MEAS - EDV(MOD-SP2): 115 ML
BH CV ECHO MEAS - EDV(MOD-SP4): 117 ML
BH CV ECHO MEAS - EF(MOD-SP2): 63.1 %
BH CV ECHO MEAS - ESV(MOD-SP2): 42.4 ML
BH CV ECHO MEAS - ESV(MOD-SP4): 72.7 ML
BH CV ECHO MEAS - LV DIASTOLIC VOL/BSA (35-75): 58.3 ML/M^2
BH CV ECHO MEAS - LV MAX PG: 3.4 MMHG
BH CV ECHO MEAS - LV MEAN PG: 2 MMHG
BH CV ECHO MEAS - LV SYSTOLIC VOL/BSA (12-30): 36.2 ML/M^2
BH CV ECHO MEAS - LV V1 MAX: 92 CM/SEC
BH CV ECHO MEAS - LV V1 MEAN: 56.3 CM/SEC
BH CV ECHO MEAS - LV V1 VTI: 17.8 CM
BH CV ECHO MEAS - LVLD AP2: 8.8 CM
BH CV ECHO MEAS - LVLD AP4: 9.7 CM
BH CV ECHO MEAS - LVLS AP2: 7.5 CM
BH CV ECHO MEAS - LVLS AP4: 9.2 CM
BH CV ECHO MEAS - MV A MAX VEL: 83.3 CM/SEC
BH CV ECHO MEAS - MV DEC TIME: 0.22 SEC
BH CV ECHO MEAS - MV E MAX VEL: 70.6 CM/SEC
BH CV ECHO MEAS - MV E/A: 0.85
BH CV ECHO MEAS - SI(MOD-SP2): 36.2 ML/M^2
BH CV ECHO MEAS - SI(MOD-SP4): 22.1 ML/M^2
BH CV ECHO MEAS - SV(MOD-SP2): 72.6 ML
BH CV ECHO MEAS - SV(MOD-SP4): 44.3 ML
BH CV ECHO MEAS - TR MAX VEL: 167 CM/SEC
BUN SERPL-MCNC: 19 MG/DL (ref 8–23)
BUN/CREAT SERPL: 21.1 (ref 7–25)
CALCIUM SPEC-SCNC: 9.2 MG/DL (ref 8.6–10.5)
CHLORIDE SERPL-SCNC: 97 MMOL/L (ref 98–107)
CO2 SERPL-SCNC: 27 MMOL/L (ref 22–29)
CREAT SERPL-MCNC: 0.9 MG/DL (ref 0.76–1.27)
DEPRECATED RDW RBC AUTO: 37.4 FL (ref 37–54)
ERYTHROCYTE [DISTWIDTH] IN BLOOD BY AUTOMATED COUNT: 12.3 % (ref 12.3–15.4)
GFR SERPL CREATININE-BSD FRML MDRD: 83 ML/MIN/1.73
GLUCOSE BLDC GLUCOMTR-MCNC: 201 MG/DL (ref 70–130)
GLUCOSE BLDC GLUCOMTR-MCNC: 205 MG/DL (ref 70–130)
GLUCOSE BLDC GLUCOMTR-MCNC: 254 MG/DL (ref 70–130)
GLUCOSE BLDC GLUCOMTR-MCNC: 315 MG/DL (ref 70–130)
GLUCOSE SERPL-MCNC: 189 MG/DL (ref 65–99)
HCT VFR BLD AUTO: 36.5 % (ref 37.5–51)
HGB BLD-MCNC: 12.8 G/DL (ref 13–17.7)
MAXIMAL PREDICTED HEART RATE: 147 BPM
MCH RBC QN AUTO: 29 PG (ref 26.6–33)
MCHC RBC AUTO-ENTMCNC: 35.1 G/DL (ref 31.5–35.7)
MCV RBC AUTO: 82.6 FL (ref 79–97)
PLATELET # BLD AUTO: 267 10*3/MM3 (ref 140–450)
PMV BLD AUTO: 10.2 FL (ref 6–12)
POTASSIUM SERPL-SCNC: 4.3 MMOL/L (ref 3.5–5.2)
RBC # BLD AUTO: 4.42 10*6/MM3 (ref 4.14–5.8)
SODIUM SERPL-SCNC: 132 MMOL/L (ref 136–145)
STRESS TARGET HR: 125 BPM
WBC # BLD AUTO: 8.64 10*3/MM3 (ref 3.4–10.8)

## 2021-01-17 PROCEDURE — 93308 TTE F-UP OR LMTD: CPT

## 2021-01-17 PROCEDURE — 93321 DOPPLER ECHO F-UP/LMTD STD: CPT | Performed by: INTERNAL MEDICINE

## 2021-01-17 PROCEDURE — 93325 DOPPLER ECHO COLOR FLOW MAPG: CPT

## 2021-01-17 PROCEDURE — 99232 SBSQ HOSP IP/OBS MODERATE 35: CPT | Performed by: NURSE PRACTITIONER

## 2021-01-17 PROCEDURE — 93321 DOPPLER ECHO F-UP/LMTD STD: CPT

## 2021-01-17 PROCEDURE — 63710000001 INSULIN LISPRO (HUMAN) PER 5 UNITS: Performed by: NURSE PRACTITIONER

## 2021-01-17 PROCEDURE — 93308 TTE F-UP OR LMTD: CPT | Performed by: INTERNAL MEDICINE

## 2021-01-17 PROCEDURE — 93356 MYOCRD STRAIN IMG SPCKL TRCK: CPT | Performed by: INTERNAL MEDICINE

## 2021-01-17 PROCEDURE — 99024 POSTOP FOLLOW-UP VISIT: CPT | Performed by: THORACIC SURGERY (CARDIOTHORACIC VASCULAR SURGERY)

## 2021-01-17 PROCEDURE — 80048 BASIC METABOLIC PNL TOTAL CA: CPT | Performed by: INTERNAL MEDICINE

## 2021-01-17 PROCEDURE — 93356 MYOCRD STRAIN IMG SPCKL TRCK: CPT

## 2021-01-17 PROCEDURE — 82962 GLUCOSE BLOOD TEST: CPT

## 2021-01-17 PROCEDURE — 25010000002 PERFLUTREN 6.52 MG/ML SUSPENSION: Performed by: INTERNAL MEDICINE

## 2021-01-17 PROCEDURE — 85027 COMPLETE CBC AUTOMATED: CPT | Performed by: INTERNAL MEDICINE

## 2021-01-17 PROCEDURE — 93325 DOPPLER ECHO COLOR FLOW MAPG: CPT | Performed by: INTERNAL MEDICINE

## 2021-01-17 RX ADMIN — SODIUM CHLORIDE, PRESERVATIVE FREE 10 ML: 5 INJECTION INTRAVENOUS at 12:15

## 2021-01-17 RX ADMIN — INSULIN LISPRO 2 UNITS: 100 INJECTION, SOLUTION INTRAVENOUS; SUBCUTANEOUS at 17:04

## 2021-01-17 RX ADMIN — GABAPENTIN 600 MG: 300 CAPSULE ORAL at 05:21

## 2021-01-17 RX ADMIN — CARVEDILOL 6.25 MG: 6.25 TABLET, FILM COATED ORAL at 08:12

## 2021-01-17 RX ADMIN — FUROSEMIDE 40 MG: 40 TABLET ORAL at 08:12

## 2021-01-17 RX ADMIN — INSULIN LISPRO 4 UNITS: 100 INJECTION, SOLUTION INTRAVENOUS; SUBCUTANEOUS at 08:12

## 2021-01-17 RX ADMIN — SODIUM CHLORIDE, PRESERVATIVE FREE 10 ML: 5 INJECTION INTRAVENOUS at 21:27

## 2021-01-17 RX ADMIN — ASPIRIN 81 MG: 81 TABLET, CHEWABLE ORAL at 08:12

## 2021-01-17 RX ADMIN — GABAPENTIN 600 MG: 300 CAPSULE ORAL at 13:16

## 2021-01-17 RX ADMIN — ATORVASTATIN CALCIUM 20 MG: 10 TABLET, FILM COATED ORAL at 08:12

## 2021-01-17 RX ADMIN — CARVEDILOL 6.25 MG: 6.25 TABLET, FILM COATED ORAL at 17:57

## 2021-01-17 RX ADMIN — PERFLUTREN: 6.52 INJECTION, SUSPENSION INTRAVENOUS at 10:50

## 2021-01-17 RX ADMIN — TAMSULOSIN HYDROCHLORIDE 0.4 MG: 0.4 CAPSULE ORAL at 08:12

## 2021-01-17 RX ADMIN — AMIODARONE HYDROCHLORIDE 400 MG: 200 TABLET ORAL at 17:06

## 2021-01-17 RX ADMIN — LISINOPRIL 5 MG: 5 TABLET ORAL at 08:12

## 2021-01-17 RX ADMIN — AMIODARONE HYDROCHLORIDE 400 MG: 200 TABLET ORAL at 08:12

## 2021-01-17 RX ADMIN — INSULIN LISPRO 6 UNITS: 100 INJECTION, SOLUTION INTRAVENOUS; SUBCUTANEOUS at 12:14

## 2021-01-17 RX ADMIN — DOCUSATE SODIUM 100 MG: 100 CAPSULE ORAL at 08:12

## 2021-01-17 RX ADMIN — DOCUSATE SODIUM 100 MG: 100 CAPSULE ORAL at 21:27

## 2021-01-17 RX ADMIN — FAMOTIDINE 20 MG: 20 TABLET, FILM COATED ORAL at 08:12

## 2021-01-17 RX ADMIN — POTASSIUM CHLORIDE 20 MEQ: 750 CAPSULE, EXTENDED RELEASE ORAL at 08:12

## 2021-01-17 RX ADMIN — GABAPENTIN 600 MG: 300 CAPSULE ORAL at 21:27

## 2021-01-17 RX ADMIN — POLYETHYLENE GLYCOL 3350 17 G: 17 POWDER, FOR SOLUTION ORAL at 08:12

## 2021-01-17 NOTE — PROGRESS NOTES
[facility] Medicine Services  INPATIENT PROGRESS NOTE    Patient Name: Wesley Eduardo  Date of Admission: 1/7/2021  Today's Date: 01/17/21  Length of Stay: 9  Primary Care Physician: Alfredito Land MD    Subjective   Chief Complaint: Awaiting CABG.   HPI  He had a small bowel movement this morning after MiraLAX.    CTS repeated an echocardiogram today.  His ejection fraction has improved to 41-45% from 36-40% on his previous echocardiogram of 1/8.    He was told that the surgical team would be at his room to get him around 6:15 AM.  His wife plans to come up around 5:30 AM so she can see him prior to surgery.    He has been walking.  He has not experienced any shortness of breath or new chest discomfort.    Review of Systems  All pertinent negatives and positives are as above. All other systems have been reviewed and are negative unless otherwise stated.     Objective    Temp:  [97.6 °F (36.4 °C)-98.6 °F (37 °C)] 97.9 °F (36.6 °C)  Heart Rate:  [57-62] 61  Resp:  [14-16] 16  BP: (106-136)/(51-59) 106/59  Physical Exam   Vitals signs reviewed.   Constitutional:       General: He is not in acute distress.     Appearance: He is not toxic-appearing.      Comments: Sitting up in the chair. His wife is present with him. Discussed with his nurse, Binta.   HENT:      Head: Normocephalic and atraumatic.      Mouth/Throat:      Mouth: Mucous membranes are moist.      Pharynx: Oropharynx is clear.   Eyes:      Extraocular Movements: Extraocular movements intact.      Conjunctiva/sclera: Conjunctivae normal.      Pupils: Pupils are equal, round, and reactive to light.   Neck:      Musculoskeletal: Normal range of motion and neck supple. No muscular tenderness.   Cardiovascular:      Rate and Rhythm: Normal rate and regular rhythm.      Pulses: Normal pulses.      Heart sounds: No murmur.      Comments: Sinus rhythm.   Pulmonary: On room air.      Effort: Pulmonary effort is normal.       Breath sounds: Normal breath sounds. No wheezing.   Abdominal:      General: Bowel sounds are normal. There is no distension.      Palpations: Abdomen is soft.      Tenderness: There is no abdominal tenderness.   Musculoskeletal: Normal range of motion.         General: No swelling or tenderness.   Skin:     General: Skin is warm and dry.      Findings: No erythema or rash.   Neurological:      General: No focal deficit present.      Mental Status: He is alert and oriented to person, place, and time.      Cranial Nerves: No cranial nerve deficit.      Motor: No weakness.   Psychiatric:         Mood and Affect: Mood normal.         Behavior: Behavior normal.     Results Review:  I have reviewed the labs, radiology results, and diagnostic studies.    Laboratory Data:   Results from last 7 days   Lab Units 01/17/21  0520 01/12/21  0526 01/11/21  1421   WBC 10*3/mm3 8.64 9.89 7.82   HEMOGLOBIN g/dL 12.8* 14.0 15.0   HEMATOCRIT % 36.5* 41.8 42.9   PLATELETS 10*3/mm3 267 229 245     Results from last 7 days   Lab Units 01/17/21  0520 01/16/21  0507 01/14/21  0552   SODIUM mmol/L 132* 130* 133*   POTASSIUM mmol/L 4.3 4.0 4.0   CHLORIDE mmol/L 97* 94* 96*   CO2 mmol/L 27.0 26.0 26.0   BUN mg/dL 19 19 20   CREATININE mg/dL 0.90 0.89 0.81   CALCIUM mg/dL 9.2 9.5 9.7   GLUCOSE mg/dL 189* 202* 182*     Results for orders placed during the hospital encounter of 01/07/21   Adult Transthoracic Echo Limited W/ Cont if Necessary Per Protocol    Narrative · Left ventricular ejection fraction appears to be 41 - 45%. Left   ventricular systolic function is mildl-moderately decreased.  · The following left ventricular wall segments are hypokinetic: apical   anterior, apical inferior, mid inferior, apical septal, mid inferoseptal,   mid anteroseptal and basal inferior. The following left ventricular wall   segments are akinetic: apex.  · Normal size and function of the right ventricle.  · No significant valvular pathology.  ·  Compared to prior exam from 1/8/2021, LV function appears slightly   improved.  · Estimated right ventricular systolic pressure from tricuspid   regurgitation is normal (<35 mmHg).        I have reviewed the patient's current medications.     Assessment/Plan     Active Hospital Problems    Diagnosis   • **Acute systolic congestive heart failure (CMS/[facility])   • NSTEMI (non-ST elevated myocardial infarction) (CMS/[facility])   • 3-vessel coronary artery disease   • Type 2 diabetes mellitus, without long-term current use of insulin (CMS/[facility])   • Hypertension   • Hyperlipidemia   • Hyponatremia     Plan:  1. The patient was transferred from [facility] on 1/7 for complaints of shortness of breath.  He was found to have a NSTEMI was transferred to our facility for higher level of care. He was also found to be hypoxic and tachypneic.  CTA of the chest negative for pulmonary embolus.  Concern for fluid overload with bilateral small pleural effusions.  ProBNP 1,608.  He was started on diruesis.  2.  Cardiology evaluated the patient.  Echo on 1/8 showed ejection fraction of 36 to 40% with grade 1 diastolic dysfunction.  Repeat echocardiogram today shows improvement of his left ventricular function to an EF of 41-45%.  Lasix was transitioned to oral on 1/10.  Continue aspirin, beta-blocker, ACE-I and statin.  Cardiac catheterization on 1/11 showed severe three-vessel coronary disease.    3.  CT surgery evaluated patient.  Last dose of Plavix is on 1/11.  Oral amiodarone initiated per CT surgery. CABG planned on Monday, 1/18.   3.  A1c 7.1.  Moderate dose sliding scale insulin.  Metformin on hold.  Started Levemir to improve his glycemic control.  4.  He was previously on indapamide that was felt to cause mild hyponatremia.  His serum sodium is improving today to 132 from 130.  5.  Encourage ambulation as tolerated.  Incentive spirometry.  6.  Bowel regimen.     Discharge Planning: Will be per CTS after his CABG  tomorrow.      Electronically signed by Rolando Simental DO, 01/17/21, 14:27 CST.

## 2021-01-17 NOTE — PLAN OF CARE
Goal Outcome Evaluation:  Plan of Care Reviewed With: patient  Progress: no change  Outcome Summary: Pt cont to wait for CABG on Monday . no c/o offered up ad ok .cont to monitor sb/s 55-62.

## 2021-01-17 NOTE — PROGRESS NOTES
[facility] -  Progress Note     LOS: 9 days   Patient Care Team:  Alfredito Land MD as PCP - General (Family Medicine)    Chief Complaint: Shortness of breath on admission    Subjective     Interval History:     Patient Complaints: The patient continues to feel well overall.  He denies any chest pain.  Shortness of breath is resolved.  He denies any edema.  He denies any orthopnea or PND.  He is maintaining normal sinus rhythm with heart rates in the 50s to 60s on telemetry.  Blood pressures remain well controlled.  Is now on Colace and MiraLAX-no BM in 2 to 3 days.      Review of Systems:     Review of Systems   Constitutional: Negative for diaphoresis, fatigue, fever and unexpected weight change.   HENT: Negative for nosebleeds.    Respiratory: Negative for apnea, cough, chest tightness, shortness of breath and wheezing.    Cardiovascular: Negative for chest pain, palpitations and leg swelling.   Gastrointestinal: Positive for constipation. Negative for abdominal distention, nausea and vomiting.   Genitourinary: Negative for hematuria.   Musculoskeletal: Negative for gait problem.   Skin: Negative for color change.   Neurological: Negative for dizziness, syncope, weakness and light-headedness.     Objective     Vital Sign Min/Max for last 24 hours  Temp  Min: 97.6 °F (36.4 °C)  Max: 98.6 °F (37 °C)   BP  Min: 114/57  Max: 136/51   Pulse  Min: 57  Max: 62   Resp  Min: 14  Max: 16   SpO2  Min: 97 %  Max: 100 %   No data recorded   Weight  Min: 85.6 kg (188 lb 12.8 oz)  Max: 85.6 kg (188 lb 12.8 oz)         01/16/21 1932   Weight: 85.6 kg (188 lb 12.8 oz)       Physical Exam:    Constitutional:       General: Not in acute distress.     Appearance: Well-developed. Not diaphoretic.   Eyes:      Pupils: Pupils are equal, round, and reactive to light.   HENT:      Head: Normocephalic and atraumatic.   Neck:      Musculoskeletal: Normal range of motion and neck supple.      Vascular: No  JVD.   Pulmonary:      Effort: Pulmonary effort is normal. No respiratory distress.      Breath sounds: Normal breath sounds.   Chest:      Chest wall: Not tender to palpatation.   Cardiovascular:      Normal rate. Regular rhythm.      Murmurs: There is no murmur.   Edema:     Peripheral edema absent.   Abdominal:      General: Bowel sounds are normal. There is no distension.      Palpations: Abdomen is soft.      Tenderness: There is no abdominal tenderness.   Musculoskeletal: Normal range of motion.   Skin:     General: Skin is warm and dry.   Neurological:      Mental Status: Alert and oriented to person, place, and time.      Cranial Nerves: No cranial nerve deficit.   Psychiatric:         Behavior: Behavior normal.       Results Review:   Lab Results (last 72 hours)     Procedure Component Value Units Date/Time    POC Glucose Once [[id number]]  (Abnormal) Collected: 01/13/21 1103    Specimen: Blood Updated: 01/13/21 1120     Glucose 325 mg/dL      Comment: : [id number] Irma MikiaMeter ID: [id number]       Blood Gas, Arterial With Co-Ox [[id number]]  (Abnormal) Collected: 01/13/21 0906    Specimen: Arterial Blood Updated: 01/13/21 0909     Site Left Radial     Reddy's Test Positive     pH, Arterial 7.460 pH units      Comment: 83 Value above reference range        pCO2, Arterial 30.9 mm Hg      Comment: 84 Value below reference range        pO2, Arterial 94.8 mm Hg      HCO3, Arterial 22.0 mmol/L      Base Excess, Arterial -0.9 mmol/L      Comment: 84 Value below reference range        O2 Saturation, Arterial 97.6 %      Hemoglobin, Blood Gas 14.8 g/dL      Hematocrit, Blood Gas 45.4 %      Oxyhemoglobin 96.4 %      Methemoglobin 0.50 %      Carboxyhemoglobin 0.7 %      A-a Gradiant 18.6 mmHg      Temperature 37.0 C      Sodium, Arterial 130 mmol/L      Comment: 84 Value below reference range        Potassium, Arterial 4.3 mmol/L      Barometric Pressure for Blood Gas 753 mmHg      Modality Room Air      Ventilator Mode NA     Note --     Collected by [id number]     Comment: Meter: [id number]     :  [id number]        pH, Temp Corrected 7.460 pH Units      pCO2, Temperature Corrected 30.9 mm Hg      pO2, Temperature Corrected 94.8 mm Hg     POC Glucose Once [[id number]]  (Abnormal) Collected: 01/13/21 0727    Specimen: Blood Updated: 01/13/21 0743     Glucose 183 mg/dL      Comment: : [id number] Irma MikiaMeter ID: [id number]       Blood Culture - Blood, Arm, Left [[id number]] Collected: 01/08/21 0046    Specimen: Blood from Arm, Left Updated: 01/13/21 0100     Blood Culture No growth at 5 days    Blood Culture - Blood, Arm, Right [[id number]] Collected: 01/08/21 0046    Specimen: Blood from Arm, Right Updated: 01/13/21 0100     Blood Culture No growth at 5 days    COVID PRE-OP / PRE-PROCEDURE SCREENING ORDER (NO ISOLATION) - Swab, Nasal Cavity [[id number]]  (Normal) Collected: 01/12/21 1552    Specimen: Swab from Nasal Cavity Updated: 01/12/21 1730    Narrative:      The following orders were created for panel order COVID PRE-OP / PRE-PROCEDURE SCREENING ORDER (NO ISOLATION) - Swab, Nasal Cavity.  Procedure                               Abnormality         Status                     ---------                               -----------         ------                     COVID-19,[facility] [employer] [facility]...[[id number]]  Normal              Final result                 Please view results for these tests on the individual orders.    COVID-19,[facility] [employer] IN-HOUSE,Nasal Swab No Transport Media 3-4 HR TAT - Swab, Nasal Cavity [[id number]]  (Normal) Collected: 01/12/21 1552    Specimen: Swab from Nasal Cavity Updated: 01/12/21 1730     COVID19 Not Detected    Narrative:      Fact sheet for providers: https://www.fda.gov/media/598894/download     Fact sheet for patients: https://www.fda.gov/media/942020/download    Test performed by PCR.    POC Glucose Once [[id number]]  (Abnormal) Collected: 01/12/21 1621    Specimen: Blood  Updated: 01/12/21 1644     Glucose 146 mg/dL      Comment: : [id number] Jason HeatherMeter ID: [id number]       POC Glucose Once [[id number]]  (Abnormal) Collected: 01/12/21 1041    Specimen: Blood Updated: 01/12/21 1103     Glucose 264 mg/dL      Comment: : [id number] Irma MikiaMeter ID: [id number]       POC Glucose Once [[id number]]  (Abnormal) Collected: 01/12/21 0811    Specimen: Blood Updated: 01/12/21 0827     Glucose 189 mg/dL      Comment: : [id number] Irma MikiaMeter ID: [id number]       Basic Metabolic Panel [[id number]]  (Abnormal) Collected: 01/12/21 0526    Specimen: Blood Updated: 01/12/21 0630     Glucose 174 mg/dL      BUN 18 mg/dL      Creatinine 0.75 mg/dL      Sodium 134 mmol/L      Potassium 4.1 mmol/L      Comment: Slight hemolysis detected by analyzer. Results may be affected.        Chloride 98 mmol/L      CO2 22.0 mmol/L      Calcium 9.8 mg/dL      eGFR Non African Amer 102 mL/min/1.73      BUN/Creatinine Ratio 24.0     Anion Gap 14.0 mmol/L     Narrative:      GFR Normal >60  Chronic Kidney Disease <60  Kidney Failure <15      CBC (No Diff) [[id number]]  (Normal) Collected: 01/12/21 0526    Specimen: Blood Updated: 01/12/21 0604     WBC 9.89 10*3/mm3      RBC 4.95 10*6/mm3      Hemoglobin 14.0 g/dL      Hematocrit 41.8 %      MCV 84.4 fL      MCH 28.3 pg      MCHC 33.5 g/dL      RDW 12.6 %      RDW-SD 38.4 fl      MPV 11.1 fL      Platelets 229 10*3/mm3     POC Glucose Once [[id number]]  (Abnormal) Collected: 01/11/21 1952    Specimen: Blood Updated: 01/11/21 2022     Glucose 206 mg/dL      Comment: : [id number] Tyler CohenferMeter ID: [id number]       POC Glucose Once [[id number]]  (Abnormal) Collected: 01/11/21 1654    Specimen: Blood Updated: 01/11/21 1706     Glucose 191 mg/dL      Comment: : [id number] Jerardo EuraisaMeter ID: [id number]       Basic Metabolic Panel [[id number]]  (Abnormal) Collected: 01/11/21 1421    Specimen: Blood Updated: 01/11/21 1501     Glucose 159  mg/dL      BUN 18 mg/dL      Creatinine 0.80 mg/dL      Sodium 137 mmol/L      Potassium 4.1 mmol/L      Chloride 98 mmol/L      CO2 26.0 mmol/L      Calcium 10.2 mg/dL      eGFR Non African Amer 95 mL/min/1.73      BUN/Creatinine Ratio 22.5     Anion Gap 13.0 mmol/L     Narrative:      GFR Normal >60  Chronic Kidney Disease <60  Kidney Failure <15      CBC & Differential [[id number]]  (Abnormal) Collected: 01/11/21 1421    Specimen: Blood Updated: 01/11/21 1442    Narrative:      The following orders were created for panel order CBC & Differential.  Procedure                               Abnormality         Status                     ---------                               -----------         ------                     CBC Auto Differential[[id number]]        Abnormal            Final result                 Please view results for these tests on the individual orders.    CBC Auto Differential [[id number]]  (Abnormal) Collected: 01/11/21 1421    Specimen: Blood Updated: 01/11/21 1442     WBC 7.82 10*3/mm3      RBC 5.04 10*6/mm3      Hemoglobin 15.0 g/dL      Hematocrit 42.9 %      MCV 85.1 fL      MCH 29.8 pg      MCHC 35.0 g/dL      RDW 12.8 %      RDW-SD 39.6 fl      MPV 10.5 fL      Platelets 245 10*3/mm3      Neutrophil % 56.5 %      Lymphocyte % 25.2 %      Monocyte % 17.4 %      Eosinophil % 0.1 %      Basophil % 0.5 %      Immature Grans % 0.3 %      Neutrophils, Absolute 4.42 10*3/mm3      Lymphocytes, Absolute 1.97 10*3/mm3      Monocytes, Absolute 1.36 10*3/mm3      Eosinophils, Absolute 0.01 10*3/mm3      Basophils, Absolute 0.04 10*3/mm3      Immature Grans, Absolute 0.02 10*3/mm3      nRBC 0.0 /100 WBC     Magnesium [[id number]]  (Normal) Collected: 01/11/21 0428    Specimen: Blood Updated: 01/11/21 0825     Magnesium 1.9 mg/dL     POC Glucose Once [[id number]]  (Abnormal) Collected: 01/11/21 0715    Specimen: Blood Updated: 01/11/21 0729     Glucose 224 mg/dL      Comment: : [id number] Sly  Gina ID: [id number]       Basic Metabolic Panel [[id number]]  (Abnormal) Collected: 01/11/21 0428    Specimen: Blood Updated: 01/11/21 0539     Glucose 176 mg/dL      BUN 20 mg/dL      Creatinine 0.70 mg/dL      Sodium 136 mmol/L      Potassium 4.0 mmol/L      Chloride 100 mmol/L      CO2 23.0 mmol/L      Calcium 9.7 mg/dL      eGFR Non African Amer 111 mL/min/1.73      BUN/Creatinine Ratio 28.6     Anion Gap 13.0 mmol/L     Narrative:      GFR Normal >60  Chronic Kidney Disease <60  Kidney Failure <15      POC Glucose Once [[id number]]  (Abnormal) Collected: 01/10/21 2021    Specimen: Blood Updated: 01/10/21 2039     Glucose 181 mg/dL      Comment: : [id number] Lucidna IveyitaMeter ID: [id number]       POC Glucose Once [[id number]]  (Normal) Collected: 01/10/21 1550    Specimen: Blood Updated: 01/10/21 1601     Glucose 121 mg/dL      Comment: : [id number] Kamranroel SindhuianMeter ID: [id number]                 Echo EF Estimated  No results found for: ECHOEFEST      Cath Ejection Fraction Quantitative  No results found for: CATHEF        Medication Review: yes  Current Facility-Administered Medications   Medication Dose Route Frequency Provider Last Rate Last Admin   • acetaminophen (TYLENOL) tablet 650 mg  650 mg Oral Q4H PRN Cade Hong MD       • acetaminophen (TYLENOL) tablet 650 mg  650 mg Oral Q4H PRN Cade Hong MD       • amiodarone (PACERONE) tablet 400 mg  400 mg Oral BID With Meals Reji Wood MD   400 mg at 01/17/21 0812   • amitriptyline (ELAVIL) tablet 100 mg  100 mg Oral Nightly Cade Hong MD   100 mg at 01/16/21 2155   • aspirin chewable tablet 81 mg  81 mg Oral Daily Cade Hong MD   81 mg at 01/17/21 0812   • atorvastatin (LIPITOR) tablet 20 mg  20 mg Oral Daily Reji Wood MD   20 mg at 01/17/21 0812   • bisacodyl (DULCOLAX) EC tablet 10 mg  10 mg Oral Daily PRN Simental, Rolando C, DO       • carvedilol (COREG) tablet 6.25 mg  6.25 mg Oral BID With Meals Gely,  Cade Wells MD   6.25 mg at 01/17/21 0812   • dextrose (D50W) 25 g/ 50mL Intravenous Solution 25 g  25 g Intravenous Q15 Min PRN Cade Hong MD       • dextrose (GLUTOSE) oral gel 15 g  15 g Oral Q15 Min PRN Cade Hong MD       • docusate sodium (COLACE) capsule 100 mg  100 mg Oral BID Jossie Lugo APRN   100 mg at 01/17/21 0812   • famotidine (PEPCID) tablet 20 mg  20 mg Oral Daily Cade Hong MD   20 mg at 01/17/21 0812   • furosemide (LASIX) tablet 40 mg  40 mg Oral Daily Cade Hong MD   40 mg at 01/17/21 0812   • gabapentin (NEURONTIN) capsule 600 mg  600 mg Oral Q8H Cade Hong MD   600 mg at 01/17/21 0521   • glucagon (human recombinant) (GLUCAGEN DIAGNOSTIC) injection 1 mg  1 mg Subcutaneous Q15 Min PRN Cade Hong MD       • insulin detemir (LEVEMIR) injection 10 Units  10 Units Subcutaneous Nightly Rolando Simental, DO   10 Units at 01/16/21 2157   • insulin lispro (humaLOG, ADMELOG) injection 0-9 Units  0-9 Units Subcutaneous TID AC Jeannine Yanez APRN   4 Units at 01/17/21 0812   • lisinopril (PRINIVIL,ZESTRIL) tablet 5 mg  5 mg Oral Q24H Cade Hong MD   5 mg at 01/17/21 0812   • ondansetron (ZOFRAN) injection 4 mg  4 mg Intravenous Q6H PRN Cade Hong MD       • polyethylene glycol (MIRALAX) packet 17 g  17 g Oral Daily Rolando Simental DO   17 g at 01/17/21 0812   • potassium chloride (MICRO-K) CR capsule 20 mEq  20 mEq Oral Daily Cade Hong MD   20 mEq at 01/17/21 0812   • sodium chloride 0.9 % flush 10 mL  10 mL Intravenous PRN Cade Hong MD       • sodium chloride 0.9 % flush 10 mL  10 mL Intravenous Q12H Cade Hong MD   10 mL at 01/16/21 2156   • sodium chloride 0.9 % flush 10 mL  10 mL Intravenous PRN Codi Hester PA-C       • sodium chloride 0.9 % infusion  1-3 mL/kg/hr Intravenous Continuous Codi Hester PA-C 100 mL/hr at 01/11/21 1435 1.149 mL/kg/hr at 01/11/21 1435   • tamsulosin (FLOMAX) 24 hr capsule 0.4 mg   0.4 mg Oral Daily Cade Hong MD   0.4 mg at 01/17/21 0812         Assessment/Plan     -Acute systolic congestive heart failure/ischemic cardiomyopathy; now euvolemic on exam  -Non-STEMI  -Coronary artery disease with severe three-vessel CAD  -Diabetes mellitus type 2  -Carotid artery disease  -Essential hypertension  -Mixed hyperlipidemia  -Hyponatremia- 132  -Constipation-on Colace and MiraLAX; last BM 2 to 3 days ago    Plan-  Continue current medical therapy including aspirin, atorvastatin, Coreg, oral furosemide and lisinopril.  Indapamide discontinued on 1/14/2021  Monitor volume status closely  Oral amiodarone initiated per CT surgery  Plans for CABG on Monday, 1/18/2021, after Plavix is out of his system.   Monitor telemetry.  Repeat echo this weekend to reassess LV function per CT surgery    Jossie Lugo, APRN  01/17/21  09:23 CST

## 2021-01-17 NOTE — PROGRESS NOTES
"Patient name: Wesley Eduardo  Patient : 1945  VISIT # [id number]  MR #[id number]        Subjective   Chief Complaint   Patient presents with   • Shortness of Breath     Sitting up in the chair. On room air. No chest pain or shortness of breath.   His wife is at bedside.  He is ready for his operation.    Telemetry: sinus     ROS: no fevers or chills and as above.  To note tenderness to right groin.  Bruising.       Objective     Visit Vitals  /54 (BP Location: Left arm, Patient Position: Lying)   Pulse 59   Temp 97.6 °F (36.4 °C) (Oral)   Resp 16   Ht 175.3 cm (69\")   Wt 85.6 kg (188 lb 12.8 oz)   SpO2 97%   BMI 27.88 kg/m²       Intake/Output Summary (Last 24 hours) at 2021 1213  Last data filed at 2021 1200  Gross per 24 hour   Intake 340 ml   Output 1875 ml   Net -1535 ml       Lab:     CBC:  Results from last 7 days   Lab Units 21  0520 21  0526 21  1421   WBC 10*3/mm3 8.64 9.89 7.82   HEMATOCRIT % 36.5* 41.8 42.9   PLATELETS 10*3/mm3 267 229 245          BMP:  Results from last 7 days   Lab Units 21  0520 21  0507 21  0552   SODIUM mmol/L 132* 130* 133*   POTASSIUM mmol/L 4.3 4.0 4.0   CHLORIDE mmol/L 97* 94* 96*   CO2 mmol/L 27.0 26.0 26.0   GLUCOSE mg/dL 189* 202* 182*   BUN mg/dL 19 19 20   CREATININE mg/dL 0.90 0.89 0.81          COAG:        Invalid input(s): PT    IMAGES:       Imaging Results (Last 24 Hours)     ** No results found for the last 24 hours. **            Study Result    History: Carotid occlusive disease     IMPRESSION:  Impression:  1. There is less than 50% stenosis of the right internal carotid artery.  2. There is 50-69% stenosis of the left internal carotid artery.  3. Antegrade flow is demonstrated in bilateral vertebral arteries.     Comments: Bilateral carotid vertebral arterial duplex scan was  performed.     Grayscale imaging shows intimal thickening and calcified elements at the  carotid bifurcation. The right internal " carotid artery peak systolic  velocity is 80.2 cm/sec. The end-diastolic velocity is 23.7 cm/sec. The  right ICA/CCA ratio is approximately 1.1 . These findings correlate with  less than 50% stenosis of the right internal carotid artery.     Grayscale imaging shows intimal thickening and calcified elements at the  carotid bifurcation. The left internal carotid artery peak systolic  velocity is 150.1 cm/sec. The end-diastolic velocity is 36.2 cm/sec. The  left ICA/CCA ratio is approximately 2.2 . These findings correlate with  50-69% stenosis of the left internal carotid artery.     Antegrade flow is demonstrated in bilateral vertebral arteries.  There is greater than 50% stenosis in the left external carotid artery.  This report was finalized on 01/12/2021 15:27 by Dr. Markus Carr MD.     Findings:  Left heart catheterization:  Pressures:  1.  LV: 97/14 mmHg  2.  Aortic: 97/56 mmHg     Selective coronary angiography:  1. Left main: The left main is a long vessel trifurcates into the LAD, ramus intermedius, and left circumflex arteries.  There is severe 70 to 80% proximal stenosis and 60 to 70% distal stenosis.  2. Left anterior descending artery: The LAD runs in the interventricular groove giving off 2 diagonals and multiple septal perforators before wrapping around the apex as an apical recurrent branch.  There is a severe 90 to 95% stenosis in the LAD between the first and second diagonal branches.  There is a severe proximal 90 to 95% stenosis of the proximal first diagonal.  3. Ramus intermedius: There is a severe 80 to 90% stenosis in the proximal segment.  4. Left circumflex artery: The left circumflex is nondominant for posterior circulation.  It gives off 1 significant obtuse marginal branch. There is 70 to 80% stenosis severe stenosis of the proximal circumflex.  5. Right coronary artery: The RCA is dominant for posterior circulation giving rise to PDA and right PL branches.  There is severe 90 to 95%  proximal stenosis and 80 to 90% mid RCA stenosis.     Impression:  1.  Severe three-vessel coronary disease as described above.  2.  Very mildly elevated left heart filling pressures.    Echocardiogram Findings    Left Ventricle Left ventricular ejection fraction appears to be 36 - 40%. Left ventricular systolic function is moderately decreased.   Normal left ventricular cavity size and wall thickness noted. Left ventricular diastolic function is consistent with (grade I) impaired relaxation.   Right Ventricle Normal right ventricular cavity size and systolic function noted.   Left Atrium The left atrial cavity is borderline dilated.   Right Atrium Normal right atrial cavity size noted.   Aortic Valve The aortic valve is abnormal in structure. There is mild thickening of the aortic valve. The aortic valve appears trileaflet. No significant aortic valve regurgitation is present. No hemodynamically significant aortic valve stenosis is present.   Mitral Valve The mitral valve is grossly normal in structure. Mild mitral valve regurgitation is present. No significant mitral valve stenosis is present.   Tricuspid Valve The tricuspid valve is structurally normal with no significant stenosis present. Trace tricuspid valve regurgitation is present. Insufficient TR velocity profile to estimate the right ventricular systolic pressure.   Pulmonic Valve The pulmonic valve is not well visualized. There is no significant pulmonic valve regurgitation present. There is no pulmonic valve stenosis present.   Greater Vessels No dilation of the aortic root is present. The inferior vena cava is normally sized. Normal IVC inspiratory collapse of greater than 50% noted.   Pericardium The pericardium is normal. There is no evidence of pericardial effusion. .         Physical Exam:  General: Alert, oriented. No apparent distress.   Cardiovascular: Regular rate and rhythm without murmur, rubs, or gallops.    Pulmonary: Clear to auscultation  bilaterally without wheezing, rubs, or rales.  Abdomen: Soft, non distended, and non tender.  Extremities: Warm, moves all extremities. No edema.   Neurologic:  Grossly intact with no focal deficits.            Impression:  Acute systolic congestive heart failure (CMS/HCC)  NSTEMI (non-ST elevated myocardial infarction) (CMS/HCC)  Acute pulmonary edema (CMS/HCC)  Diabetes mellitus (CMS/HCC)  Hypertension  Hyperlipidemia  Coronary artery disease, multivessel  Aspirin like platelet function defect, day 4 off plavix   Bilateral carotid artery stenosis, asymptomatic      Plan:  Plan for surgical revascularization on Monday.  Repeat echocardiogram improved LV function.  RV function is preserved.  Risk, benefits, alternatives discussed with the patient and his wife.  He is to proceed forward with CABG in the a..      Reji Wood MD  01/17/21  12:13 CST

## 2021-01-18 ENCOUNTER — ANESTHESIA (OUTPATIENT)
Dept: PERIOP | Facility: HOSPITAL | Age: 76
End: 2021-01-18

## 2021-01-18 ENCOUNTER — APPOINTMENT (OUTPATIENT)
Dept: GENERAL RADIOLOGY | Facility: HOSPITAL | Age: 76
End: 2021-01-18

## 2021-01-18 ENCOUNTER — APPOINTMENT (OUTPATIENT)
Dept: CARDIOLOGY | Facility: HOSPITAL | Age: 76
End: 2021-01-18

## 2021-01-18 LAB
A-A DO2: 108.4 MMHG
A-A DO2: 193.9 MMHG
A-A DO2: 256.2 MMHG
A-A DO2: 274.5 MMHG
A-A DO2: 68.8 MMHG
A-A DO2: 78.7 MMHG
A-A DO2: 90.6 MMHG
ALBUMIN SERPL-MCNC: 3 G/DL (ref 3.5–5.2)
ALBUMIN SERPL-MCNC: 3.6 G/DL (ref 3.5–5.2)
ANION GAP SERPL CALCULATED.3IONS-SCNC: 6 MMOL/L (ref 5–15)
ANION GAP SERPL CALCULATED.3IONS-SCNC: 7 MMOL/L (ref 5–15)
APTT PPP: 34.5 SECONDS (ref 24.1–35)
ARTERIAL PATENCY WRIST A: ABNORMAL
ARTERIAL PATENCY WRIST A: NORMAL
ATMOSPHERIC PRESS: 750 MMHG
ATMOSPHERIC PRESS: 751 MMHG
ATMOSPHERIC PRESS: 752 MMHG
BASE EXCESS BLDA CALC-SCNC: -1.7 MMOL/L (ref 0–2)
BASE EXCESS BLDA CALC-SCNC: 0.3 MMOL/L (ref 0–2)
BASE EXCESS BLDA CALC-SCNC: 0.8 MMOL/L (ref 0–2)
BASE EXCESS BLDA CALC-SCNC: 1.1 MMOL/L (ref 0–2)
BASE EXCESS BLDA CALC-SCNC: 1.5 MMOL/L (ref 0–2)
BASE EXCESS BLDA CALC-SCNC: 1.8 MMOL/L (ref 0–2)
BASE EXCESS BLDA CALC-SCNC: 2.6 MMOL/L (ref 0–2)
BASE EXCESS BLDA CALC-SCNC: 2.9 MMOL/L (ref 0–2)
BASE EXCESS BLDA CALC-SCNC: 3.2 MMOL/L (ref 0–2)
BASE EXCESS BLDA CALC-SCNC: 3.9 MMOL/L (ref 0–2)
BASE EXCESS BLDV CALC-SCNC: 2.6 MMOL/L (ref 0–2)
BDY SITE: ABNORMAL
BDY SITE: NORMAL
BODY TEMPERATURE: 37 C
BUN SERPL-MCNC: 12 MG/DL (ref 8–23)
BUN SERPL-MCNC: 13 MG/DL (ref 8–23)
BUN/CREAT SERPL: 16.4 (ref 7–25)
BUN/CREAT SERPL: 17.3 (ref 7–25)
CA-I BLD-MCNC: 4.13 MG/DL (ref 4.6–5.4)
CA-I BLD-MCNC: 4.23 MG/DL (ref 4.6–5.4)
CA-I BLD-MCNC: 4.26 MG/DL (ref 4.6–5.4)
CA-I BLD-MCNC: 4.28 MG/DL (ref 4.6–5.4)
CA-I BLD-MCNC: 4.71 MG/DL (ref 4.6–5.4)
CA-I BLD-MCNC: 4.73 MG/DL (ref 4.6–5.4)
CA-I BLD-MCNC: 5.06 MG/DL (ref 4.6–5.4)
CA-I BLD-MCNC: 5.51 MG/DL (ref 4.6–5.4)
CALCIUM SPEC-SCNC: 8.7 MG/DL (ref 8.6–10.5)
CALCIUM SPEC-SCNC: 8.9 MG/DL (ref 8.6–10.5)
CHLORIDE SERPL-SCNC: 106 MMOL/L (ref 98–107)
CHLORIDE SERPL-SCNC: 107 MMOL/L (ref 98–107)
CO2 SERPL-SCNC: 24 MMOL/L (ref 22–29)
CO2 SERPL-SCNC: 26 MMOL/L (ref 22–29)
COHGB MFR BLD: 0.8 % (ref 0–5)
COHGB MFR BLD: 0.8 % (ref 0–5)
COHGB MFR BLD: 0.9 % (ref 0–5)
COHGB MFR BLD: 1 % (ref 0–5)
COHGB MFR BLD: 1 % (ref 0–5)
COHGB MFR BLD: 1.2 % (ref 0–5)
CREAT SERPL-MCNC: 0.73 MG/DL (ref 0.76–1.27)
CREAT SERPL-MCNC: 0.75 MG/DL (ref 0.76–1.27)
DEPRECATED RDW RBC AUTO: 38.6 FL (ref 37–54)
DEPRECATED RDW RBC AUTO: 39.1 FL (ref 37–54)
ERYTHROCYTE [DISTWIDTH] IN BLOOD BY AUTOMATED COUNT: 12.7 % (ref 12.3–15.4)
ERYTHROCYTE [DISTWIDTH] IN BLOOD BY AUTOMATED COUNT: 12.9 % (ref 12.3–15.4)
GAS FLOW AIRWAY: 2 LPM
GFR SERPL CREATININE-BSD FRML MDRD: 102 ML/MIN/1.73
GFR SERPL CREATININE-BSD FRML MDRD: 105 ML/MIN/1.73
GLUCOSE BLDC GLUCOMTR-MCNC: 101 MG/DL (ref 70–130)
GLUCOSE BLDC GLUCOMTR-MCNC: 107 MG/DL (ref 70–130)
GLUCOSE BLDC GLUCOMTR-MCNC: 107 MG/DL (ref 70–130)
GLUCOSE BLDC GLUCOMTR-MCNC: 122 MG/DL (ref 70–130)
GLUCOSE BLDC GLUCOMTR-MCNC: 128 MG/DL (ref 70–130)
GLUCOSE BLDC GLUCOMTR-MCNC: 131 MG/DL (ref 70–130)
GLUCOSE BLDC GLUCOMTR-MCNC: 131 MG/DL (ref 70–130)
GLUCOSE SERPL-MCNC: 104 MG/DL (ref 65–99)
GLUCOSE SERPL-MCNC: 154 MG/DL (ref 65–99)
HCO3 BLDA-SCNC: 25.3 MMOL/L (ref 20–26)
HCO3 BLDA-SCNC: 25.5 MMOL/L (ref 20–26)
HCO3 BLDA-SCNC: 26.3 MMOL/L (ref 20–26)
HCO3 BLDA-SCNC: 26.6 MMOL/L (ref 20–26)
HCO3 BLDA-SCNC: 26.6 MMOL/L (ref 20–26)
HCO3 BLDA-SCNC: 26.7 MMOL/L (ref 20–26)
HCO3 BLDA-SCNC: 26.9 MMOL/L (ref 20–26)
HCO3 BLDA-SCNC: 27 MMOL/L (ref 20–26)
HCO3 BLDA-SCNC: 27.2 MMOL/L (ref 20–26)
HCO3 BLDA-SCNC: 28.2 MMOL/L (ref 20–26)
HCO3 BLDV-SCNC: 28.3 MMOL/L (ref 22–28)
HCT VFR BLD AUTO: 29.8 % (ref 37.5–51)
HCT VFR BLD AUTO: 34 % (ref 37.5–51)
HCT VFR BLD CALC: 25 % (ref 38–51)
HCT VFR BLD CALC: 25.5 % (ref 38–51)
HCT VFR BLD CALC: 26.2 % (ref 38–51)
HCT VFR BLD CALC: 26.5 % (ref 38–51)
HCT VFR BLD CALC: 28.1 % (ref 38–51)
HCT VFR BLD CALC: 35.4 % (ref 38–51)
HCT VFR BLD CALC: 36.7 % (ref 38–51)
HGB BLD-MCNC: 10.3 G/DL (ref 13–17.7)
HGB BLD-MCNC: 12.3 G/DL (ref 13–17.7)
HGB BLDA-MCNC: 11.5 G/DL (ref 14–18)
HGB BLDA-MCNC: 12 G/DL (ref 14–18)
HGB BLDA-MCNC: 8.2 G/DL (ref 14–18)
HGB BLDA-MCNC: 8.3 G/DL (ref 14–18)
HGB BLDA-MCNC: 8.6 G/DL (ref 14–18)
HGB BLDA-MCNC: 8.7 G/DL (ref 14–18)
HGB BLDA-MCNC: 9.2 G/DL (ref 14–18)
HGB BLDA-MCNC: 9.5 G/DL (ref 14–18)
INHALED O2 CONCENTRATION: 100 %
INHALED O2 CONCENTRATION: 30 %
INHALED O2 CONCENTRATION: 50 %
INHALED O2 CONCENTRATION: 60 %
INHALED O2 CONCENTRATION: 95 %
INR PPP: 1.4 (ref 0.91–1.09)
Lab: ABNORMAL
Lab: NORMAL
Lab: NORMAL
MCH RBC QN AUTO: 28.9 PG (ref 26.6–33)
MCH RBC QN AUTO: 30.1 PG (ref 26.6–33)
MCHC RBC AUTO-ENTMCNC: 34.6 G/DL (ref 31.5–35.7)
MCHC RBC AUTO-ENTMCNC: 36.2 G/DL (ref 31.5–35.7)
MCV RBC AUTO: 83.3 FL (ref 79–97)
MCV RBC AUTO: 83.5 FL (ref 79–97)
METHGB BLD QL: 0.4 % (ref 0–3)
METHGB BLD QL: 0.8 % (ref 0–3)
METHGB BLD QL: 0.8 % (ref 0–3)
METHGB BLD QL: 1 % (ref 0–3)
METHGB BLD QL: 1.1 % (ref 0–3)
METHGB BLD QL: 1.1 % (ref 0–3)
MODALITY: ABNORMAL
MODALITY: NORMAL
NOTE: ABNORMAL
OXYHGB MFR BLDV: 81.2 % (ref 60–85)
OXYHGB MFR BLDV: 98.4 % (ref 94–99)
OXYHGB MFR BLDV: 98.4 % (ref 94–99)
OXYHGB MFR BLDV: 98.5 % (ref 94–99)
OXYHGB MFR BLDV: >98.5 % (ref 94–99)
PCO2 BLDA: 36.5 MM HG (ref 35–45)
PCO2 BLDA: 37 MM HG (ref 35–45)
PCO2 BLDA: 37.7 MM HG (ref 35–45)
PCO2 BLDA: 38.1 MM HG (ref 35–45)
PCO2 BLDA: 40.8 MM HG (ref 35–45)
PCO2 BLDA: 42.6 MM HG (ref 35–45)
PCO2 BLDA: 44.2 MM HG (ref 35–45)
PCO2 BLDA: 45.5 MM HG (ref 35–45)
PCO2 BLDA: 46.8 MM HG (ref 35–45)
PCO2 BLDA: 61.5 MM HG (ref 35–45)
PCO2 BLDV: 48.1 MM HG (ref 41–51)
PCO2 TEMP ADJ BLD: 36.5 MM HG (ref 35–45)
PCO2 TEMP ADJ BLD: 37 MM HG (ref 35–45)
PCO2 TEMP ADJ BLD: 37.7 MM HG (ref 35–45)
PCO2 TEMP ADJ BLD: 38.1 MM HG (ref 35–45)
PCO2 TEMP ADJ BLD: 40.8 MM HG (ref 35–45)
PCO2 TEMP ADJ BLD: 42.6 MM HG (ref 35–45)
PCO2 TEMP ADJ BLD: 44.2 MM HG (ref 35–45)
PCO2 TEMP ADJ BLD: 45.5 MM HG (ref 35–45)
PCO2 TEMP ADJ BLD: 46.8 MM HG (ref 35–45)
PCO2 TEMP ADJ BLD: 61.5 MM HG (ref 35–45)
PEEP RESPIRATORY: 5 CM[H2O]
PH BLDA: 7.24 PH UNITS (ref 7.35–7.45)
PH BLDA: 7.37 PH UNITS (ref 7.35–7.45)
PH BLDA: 7.38 PH UNITS (ref 7.35–7.45)
PH BLDA: 7.38 PH UNITS (ref 7.35–7.45)
PH BLDA: 7.39 PH UNITS (ref 7.35–7.45)
PH BLDA: 7.45 PH UNITS (ref 7.35–7.45)
PH BLDA: 7.45 PH UNITS (ref 7.35–7.45)
PH BLDA: 7.46 PH UNITS (ref 7.35–7.45)
PH BLDA: 7.46 PH UNITS (ref 7.35–7.45)
PH BLDA: 7.47 PH UNITS (ref 7.35–7.45)
PH BLDV: 7.38 PH UNITS (ref 7.32–7.42)
PH, TEMP CORRECTED: 7.24 PH UNITS (ref 7.35–7.45)
PH, TEMP CORRECTED: 7.37 PH UNITS (ref 7.35–7.45)
PH, TEMP CORRECTED: 7.38 PH UNITS (ref 7.35–7.45)
PH, TEMP CORRECTED: 7.38 PH UNITS (ref 7.35–7.45)
PH, TEMP CORRECTED: 7.39 PH UNITS (ref 7.35–7.45)
PH, TEMP CORRECTED: 7.45 PH UNITS (ref 7.35–7.45)
PH, TEMP CORRECTED: 7.45 PH UNITS (ref 7.35–7.45)
PH, TEMP CORRECTED: 7.46 PH UNITS (ref 7.35–7.45)
PH, TEMP CORRECTED: 7.46 PH UNITS (ref 7.35–7.45)
PH, TEMP CORRECTED: 7.47 PH UNITS (ref 7.35–7.45)
PHOSPHATE SERPL-MCNC: 4 MG/DL (ref 2.5–4.5)
PHOSPHATE SERPL-MCNC: 4.7 MG/DL (ref 2.5–4.5)
PLATELET # BLD AUTO: 225 10*3/MM3 (ref 140–450)
PLATELET # BLD AUTO: 256 10*3/MM3 (ref 140–450)
PMV BLD AUTO: 10.3 FL (ref 6–12)
PMV BLD AUTO: 10.6 FL (ref 6–12)
PO2 BLDA: 110 MM HG (ref 83–108)
PO2 BLDA: 385 MM HG (ref 83–108)
PO2 BLDA: 411 MM HG (ref 83–108)
PO2 BLDA: 449 MM HG (ref 83–108)
PO2 BLDA: 523 MM HG (ref 83–108)
PO2 BLDA: 540 MM HG (ref 83–108)
PO2 BLDA: 87.3 MM HG (ref 83–108)
PO2 BLDA: 88.4 MM HG (ref 83–108)
PO2 BLDA: >547 MM HG (ref 83–108)
PO2 BLDA: >547 MM HG (ref 83–108)
PO2 BLDV: 48.3 MM HG (ref 27–53)
PO2 TEMP ADJ BLD: 110 MM HG (ref 83–108)
PO2 TEMP ADJ BLD: 385 MM HG (ref 83–108)
PO2 TEMP ADJ BLD: 411 MM HG (ref 83–108)
PO2 TEMP ADJ BLD: 449 MM HG (ref 83–108)
PO2 TEMP ADJ BLD: 523 MM HG (ref 83–108)
PO2 TEMP ADJ BLD: 540 MM HG (ref 83–108)
PO2 TEMP ADJ BLD: 87.3 MM HG (ref 83–108)
PO2 TEMP ADJ BLD: 88.4 MM HG (ref 83–108)
PO2 TEMP ADJ BLD: >547 MM HG (ref 83–108)
PO2 TEMP ADJ BLD: >547 MM HG (ref 83–108)
POTASSIUM BLDA-SCNC: 3.8 MMOL/L (ref 3.5–5.2)
POTASSIUM BLDA-SCNC: 4.1 MMOL/L (ref 3.5–5.2)
POTASSIUM BLDA-SCNC: 4.2 MMOL/L (ref 3.5–5.2)
POTASSIUM BLDA-SCNC: 4.3 MMOL/L (ref 3.5–5.2)
POTASSIUM BLDA-SCNC: 4.3 MMOL/L (ref 3.5–5.2)
POTASSIUM BLDA-SCNC: 4.5 MMOL/L (ref 3.5–5.2)
POTASSIUM BLDA-SCNC: 4.6 MMOL/L (ref 3.5–5.2)
POTASSIUM BLDV-SCNC: 3.7 MMOL/L (ref 3.5–5.2)
POTASSIUM SERPL-SCNC: 4 MMOL/L (ref 3.5–5.2)
POTASSIUM SERPL-SCNC: 4.5 MMOL/L (ref 3.5–5.2)
PROTHROMBIN TIME: 16.8 SECONDS (ref 11.9–14.6)
PSV: 10 CMH2O
RBC # BLD AUTO: 3.57 10*6/MM3 (ref 4.14–5.8)
RBC # BLD AUTO: 4.08 10*6/MM3 (ref 4.14–5.8)
SAO2 % BLDCOA: 97.2 % (ref 94–99)
SAO2 % BLDCOA: 97.2 % (ref 94–99)
SAO2 % BLDCOA: 98.6 % (ref 94–99)
SAO2 % BLDCOA: >100.1 % (ref 94–99)
SAO2 % BLDCOV: 83.2 % (ref 45–75)
SET MECH RESP RATE: 16
SET MECH RESP RATE: 16
SET MECH RESP RATE: 18
SODIUM BLDA-SCNC: 130 MMOL/L (ref 136–145)
SODIUM BLDA-SCNC: 134 MMOL/L (ref 136–145)
SODIUM BLDA-SCNC: 134 MMOL/L (ref 136–145)
SODIUM BLDA-SCNC: 135 MMOL/L (ref 136–145)
SODIUM BLDA-SCNC: 135 MMOL/L (ref 136–145)
SODIUM BLDA-SCNC: 136 MMOL/L (ref 136–145)
SODIUM BLDA-SCNC: 138 MMOL/L (ref 136–145)
SODIUM BLDV-SCNC: 134 MMOL/L (ref 136–145)
SODIUM SERPL-SCNC: 137 MMOL/L (ref 136–145)
SODIUM SERPL-SCNC: 139 MMOL/L (ref 136–145)
VENTILATOR MODE: ABNORMAL
VENTILATOR MODE: NORMAL
VT ON VENT VENT: 550 ML
VT ON VENT VENT: 550 ML
VT ON VENT VENT: 600 ML
WBC # BLD AUTO: 16.01 10*3/MM3 (ref 3.4–10.8)
WBC # BLD AUTO: 16.97 10*3/MM3 (ref 3.4–10.8)

## 2021-01-18 PROCEDURE — C9248 INJ, CLEVIDIPINE BUTYRATE: HCPCS | Performed by: THORACIC SURGERY (CARDIOTHORACIC VASCULAR SURGERY)

## 2021-01-18 PROCEDURE — 82820 HEMOGLOBIN-OXYGEN AFFINITY: CPT

## 2021-01-18 PROCEDURE — 94799 UNLISTED PULMONARY SVC/PX: CPT

## 2021-01-18 PROCEDURE — 93325 DOPPLER ECHO COLOR FLOW MAPG: CPT | Performed by: INTERNAL MEDICINE

## 2021-01-18 PROCEDURE — C1751 CATH, INF, PER/CENT/MIDLINE: HCPCS | Performed by: NURSE ANESTHETIST, CERTIFIED REGISTERED

## 2021-01-18 PROCEDURE — 36430 TRANSFUSION BLD/BLD COMPNT: CPT

## 2021-01-18 PROCEDURE — 25810000003 DEXTROSE 5 % WITH KCL 20 MEQ 20-5 MEQ/L-% SOLUTION: Performed by: THORACIC SURGERY (CARDIOTHORACIC VASCULAR SURGERY)

## 2021-01-18 PROCEDURE — B24BZZ4 ULTRASONOGRAPHY OF HEART WITH AORTA, TRANSESOPHAGEAL: ICD-10-PCS | Performed by: ANESTHESIOLOGY

## 2021-01-18 PROCEDURE — 25010000002 HEPARIN (PORCINE) PER 1000 UNITS: Performed by: THORACIC SURGERY (CARDIOTHORACIC VASCULAR SURGERY)

## 2021-01-18 PROCEDURE — 93010 ELECTROCARDIOGRAM REPORT: CPT | Performed by: INTERNAL MEDICINE

## 2021-01-18 PROCEDURE — 021209W BYPASS CORONARY ARTERY, THREE ARTERIES FROM AORTA WITH AUTOLOGOUS VENOUS TISSUE, OPEN APPROACH: ICD-10-PCS | Performed by: THORACIC SURGERY (CARDIOTHORACIC VASCULAR SURGERY)

## 2021-01-18 PROCEDURE — 25010000002 AMIODARONE IN DEXTROSE 5% 360-4.14 MG/200ML-% SOLUTION: Performed by: THORACIC SURGERY (CARDIOTHORACIC VASCULAR SURGERY)

## 2021-01-18 PROCEDURE — 25010000002 PROPOFOL 10 MG/ML EMULSION: Performed by: NURSE ANESTHETIST, CERTIFIED REGISTERED

## 2021-01-18 PROCEDURE — 25010000002 PAPAVERINE PER 60 MG: Performed by: THORACIC SURGERY (CARDIOTHORACIC VASCULAR SURGERY)

## 2021-01-18 PROCEDURE — 25010000002 PHENYLEPHRINE 10 MG/ML SOLUTION 5 ML VIAL: Performed by: THORACIC SURGERY (CARDIOTHORACIC VASCULAR SURGERY)

## 2021-01-18 PROCEDURE — 25010000002 PHENYLEPHRINE 10 MG/ML SOLUTION

## 2021-01-18 PROCEDURE — 93312 ECHO TRANSESOPHAGEAL: CPT | Performed by: INTERNAL MEDICINE

## 2021-01-18 PROCEDURE — 82805 BLOOD GASES W/O2 SATURATION: CPT

## 2021-01-18 PROCEDURE — 33519 CABG ARTERY-VEIN THREE: CPT | Performed by: THORACIC SURGERY (CARDIOTHORACIC VASCULAR SURGERY)

## 2021-01-18 PROCEDURE — 85027 COMPLETE CBC AUTOMATED: CPT | Performed by: THORACIC SURGERY (CARDIOTHORACIC VASCULAR SURGERY)

## 2021-01-18 PROCEDURE — 25010000002 POTASSIUM CHLORIDE PER 2 MEQ OF POTASSIUM

## 2021-01-18 PROCEDURE — 82803 BLOOD GASES ANY COMBINATION: CPT

## 2021-01-18 PROCEDURE — 25010000002 VANCOMYCIN 1 G RECONSTITUTED SOLUTION: Performed by: NURSE ANESTHETIST, CERTIFIED REGISTERED

## 2021-01-18 PROCEDURE — 83050 HGB METHEMOGLOBIN QUAN: CPT

## 2021-01-18 PROCEDURE — 82375 ASSAY CARBOXYHB QUANT: CPT

## 2021-01-18 PROCEDURE — 86900 BLOOD TYPING SEROLOGIC ABO: CPT

## 2021-01-18 PROCEDURE — 94002 VENT MGMT INPAT INIT DAY: CPT

## 2021-01-18 PROCEDURE — 93005 ELECTROCARDIOGRAM TRACING: CPT | Performed by: THORACIC SURGERY (CARDIOTHORACIC VASCULAR SURGERY)

## 2021-01-18 PROCEDURE — 25010000002 VANCOMYCIN 1 G RECONSTITUTED SOLUTION 1 EACH VIAL: Performed by: THORACIC SURGERY (CARDIOTHORACIC VASCULAR SURGERY)

## 2021-01-18 PROCEDURE — 25010000002 MANNITOL PER 50 ML

## 2021-01-18 PROCEDURE — 33533 CABG ARTERIAL SINGLE: CPT | Performed by: THORACIC SURGERY (CARDIOTHORACIC VASCULAR SURGERY)

## 2021-01-18 PROCEDURE — 25010000002 ALBUMIN HUMAN 25% PER 50 ML

## 2021-01-18 PROCEDURE — 71045 X-RAY EXAM CHEST 1 VIEW: CPT

## 2021-01-18 PROCEDURE — 25010000002 HEPARIN (PORCINE) PER 1000 UNITS

## 2021-01-18 PROCEDURE — 63710000001 INSULIN REGULAR HUMAN PER 5 UNITS: Performed by: NURSE ANESTHETIST, CERTIFIED REGISTERED

## 2021-01-18 PROCEDURE — P9016 RBC LEUKOCYTES REDUCED: HCPCS

## 2021-01-18 PROCEDURE — P9047 ALBUMIN (HUMAN), 25%, 50ML: HCPCS

## 2021-01-18 PROCEDURE — 25010000003 MEPERIDINE PER 100 MG: Performed by: THORACIC SURGERY (CARDIOTHORACIC VASCULAR SURGERY)

## 2021-01-18 PROCEDURE — 5A1221Z PERFORMANCE OF CARDIAC OUTPUT, CONTINUOUS: ICD-10-PCS | Performed by: THORACIC SURGERY (CARDIOTHORACIC VASCULAR SURGERY)

## 2021-01-18 PROCEDURE — 25010000002 MIDAZOLAM PER 1 MG: Performed by: NURSE ANESTHETIST, CERTIFIED REGISTERED

## 2021-01-18 PROCEDURE — 25010000002 HEPARIN (PORCINE) PER 1000 UNITS: Performed by: NURSE ANESTHETIST, CERTIFIED REGISTERED

## 2021-01-18 PROCEDURE — 06BP0ZZ EXCISION OF RIGHT SAPHENOUS VEIN, OPEN APPROACH: ICD-10-PCS | Performed by: THORACIC SURGERY (CARDIOTHORACIC VASCULAR SURGERY)

## 2021-01-18 PROCEDURE — 25010000002 FUROSEMIDE PER 20 MG

## 2021-01-18 PROCEDURE — 82330 ASSAY OF CALCIUM: CPT

## 2021-01-18 PROCEDURE — C1713 ANCHOR/SCREW BN/BN,TIS/BN: HCPCS | Performed by: THORACIC SURGERY (CARDIOTHORACIC VASCULAR SURGERY)

## 2021-01-18 PROCEDURE — 33508 ENDOSCOPIC VEIN HARVEST: CPT | Performed by: THORACIC SURGERY (CARDIOTHORACIC VASCULAR SURGERY)

## 2021-01-18 PROCEDURE — 25010000002 CEFAZOLIN PER 500 MG: Performed by: NURSE PRACTITIONER

## 2021-01-18 PROCEDURE — 25010000002 CEFAZOLIN PER 500 MG: Performed by: THORACIC SURGERY (CARDIOTHORACIC VASCULAR SURGERY)

## 2021-01-18 PROCEDURE — 94640 AIRWAY INHALATION TREATMENT: CPT

## 2021-01-18 PROCEDURE — 25010000002 PROPOFOL 10 MG/ML EMULSION: Performed by: THORACIC SURGERY (CARDIOTHORACIC VASCULAR SURGERY)

## 2021-01-18 PROCEDURE — 80069 RENAL FUNCTION PANEL: CPT | Performed by: THORACIC SURGERY (CARDIOTHORACIC VASCULAR SURGERY)

## 2021-01-18 PROCEDURE — 25010000002 PROTAMINE SULFATE PER 10 MG: Performed by: NURSE ANESTHETIST, CERTIFIED REGISTERED

## 2021-01-18 PROCEDURE — 85610 PROTHROMBIN TIME: CPT | Performed by: THORACIC SURGERY (CARDIOTHORACIC VASCULAR SURGERY)

## 2021-01-18 PROCEDURE — 25010000003 METHYLENE BLUE 50 MG/10ML SOLUTION: Performed by: THORACIC SURGERY (CARDIOTHORACIC VASCULAR SURGERY)

## 2021-01-18 PROCEDURE — 25010000002 VANCOMYCIN 10 G RECONSTITUTED SOLUTION: Performed by: THORACIC SURGERY (CARDIOTHORACIC VASCULAR SURGERY)

## 2021-01-18 PROCEDURE — A4648 IMPLANTABLE TISSUE MARKER: HCPCS | Performed by: THORACIC SURGERY (CARDIOTHORACIC VASCULAR SURGERY)

## 2021-01-18 PROCEDURE — 25010000002 EPINEPHRINE 1 MG/ML SOLUTION 30 ML VIAL: Performed by: NURSE ANESTHETIST, CERTIFIED REGISTERED

## 2021-01-18 PROCEDURE — 02100Z9 BYPASS CORONARY ARTERY, ONE ARTERY FROM LEFT INTERNAL MAMMARY, OPEN APPROACH: ICD-10-PCS | Performed by: THORACIC SURGERY (CARDIOTHORACIC VASCULAR SURGERY)

## 2021-01-18 PROCEDURE — 85730 THROMBOPLASTIN TIME PARTIAL: CPT | Performed by: THORACIC SURGERY (CARDIOTHORACIC VASCULAR SURGERY)

## 2021-01-18 PROCEDURE — 82962 GLUCOSE BLOOD TEST: CPT

## 2021-01-18 PROCEDURE — 06BQ4ZZ EXCISION OF LEFT SAPHENOUS VEIN, PERCUTANEOUS ENDOSCOPIC APPROACH: ICD-10-PCS | Performed by: THORACIC SURGERY (CARDIOTHORACIC VASCULAR SURGERY)

## 2021-01-18 PROCEDURE — 25010000002 CLEVIDIPINE BUTYRATE PER 1 MG: Performed by: THORACIC SURGERY (CARDIOTHORACIC VASCULAR SURGERY)

## 2021-01-18 PROCEDURE — 93318 ECHO TRANSESOPHAGEAL INTRAOP: CPT | Performed by: NURSE ANESTHETIST, CERTIFIED REGISTERED

## 2021-01-18 PROCEDURE — 25010000002 MORPHINE SULFATE (PF) 2 MG/ML SOLUTION: Performed by: THORACIC SURGERY (CARDIOTHORACIC VASCULAR SURGERY)

## 2021-01-18 DEVICE — WR SUT NONABS MF SS CCS 1/2CIR CUT/CONV 5/0 18IN M657G: Type: IMPLANTABLE DEVICE | Site: STERNUM | Status: FUNCTIONAL

## 2021-01-18 DEVICE — PLEDGET INCISIONLINE REINF TFE SFT PTFE 1.5X3X7MM WHT: Type: IMPLANTABLE DEVICE | Site: HEART | Status: FUNCTIONAL

## 2021-01-18 DEVICE — WR SUT NONABS MF SS V40 1/2CIR TC 6/0 18IN M649G: Type: IMPLANTABLE DEVICE | Site: STERNUM | Status: FUNCTIONAL

## 2021-01-18 DEVICE — DISK-SHAPED STYLE, SILICONE (1 PER STERILE PKG)
Type: IMPLANTABLE DEVICE | Site: HEART | Status: FUNCTIONAL
Brand: SCANLAN® RADIOMARK® GRAFT MARKERS

## 2021-01-18 RX ORDER — DEXMEDETOMIDINE HYDROCHLORIDE 4 UG/ML
.2-1.5 INJECTION, SOLUTION INTRAVENOUS
Status: DISCONTINUED | OUTPATIENT
Start: 2021-01-18 | End: 2021-01-19

## 2021-01-18 RX ORDER — SUFENTANIL CITRATE 50 UG/ML
INJECTION EPIDURAL; INTRAVENOUS AS NEEDED
Status: DISCONTINUED | OUTPATIENT
Start: 2021-01-18 | End: 2021-01-18 | Stop reason: SURG

## 2021-01-18 RX ORDER — ONDANSETRON 2 MG/ML
4 INJECTION INTRAMUSCULAR; INTRAVENOUS EVERY 6 HOURS PRN
Status: DISCONTINUED | OUTPATIENT
Start: 2021-01-18 | End: 2021-01-23 | Stop reason: HOSPADM

## 2021-01-18 RX ORDER — ASPIRIN 81 MG/1
81 TABLET ORAL DAILY
Status: DISCONTINUED | OUTPATIENT
Start: 2021-01-20 | End: 2021-01-23 | Stop reason: HOSPADM

## 2021-01-18 RX ORDER — ALBUMIN, HUMAN INJ 5% 5 %
500 SOLUTION INTRAVENOUS ONCE
Status: COMPLETED | OUTPATIENT
Start: 2021-01-19 | End: 2021-01-19

## 2021-01-18 RX ORDER — MAGNESIUM HYDROXIDE 1200 MG/15ML
LIQUID ORAL AS NEEDED
Status: DISCONTINUED | OUTPATIENT
Start: 2021-01-18 | End: 2021-01-18 | Stop reason: HOSPADM

## 2021-01-18 RX ORDER — AMIODARONE HYDROCHLORIDE 200 MG/1
400 TABLET ORAL 2 TIMES DAILY WITH MEALS
Status: DISCONTINUED | OUTPATIENT
Start: 2021-01-19 | End: 2021-01-22

## 2021-01-18 RX ORDER — POTASSIUM CHLORIDE 29.8 MG/ML
20 INJECTION INTRAVENOUS
Status: DISCONTINUED | OUTPATIENT
Start: 2021-01-18 | End: 2021-01-19

## 2021-01-18 RX ORDER — METOPROLOL TARTRATE 5 MG/5ML
INJECTION INTRAVENOUS AS NEEDED
Status: DISCONTINUED | OUTPATIENT
Start: 2021-01-18 | End: 2021-01-18 | Stop reason: SURG

## 2021-01-18 RX ORDER — CHLORHEXIDINE GLUCONATE 0.12 MG/ML
15 RINSE ORAL EVERY 12 HOURS SCHEDULED
Status: DISCONTINUED | OUTPATIENT
Start: 2021-01-18 | End: 2021-01-18 | Stop reason: SDUPTHER

## 2021-01-18 RX ORDER — MORPHINE SULFATE 2 MG/ML
2 INJECTION, SOLUTION INTRAMUSCULAR; INTRAVENOUS
Status: DISCONTINUED | OUTPATIENT
Start: 2021-01-18 | End: 2021-01-19

## 2021-01-18 RX ORDER — POTASSIUM CHLORIDE, DEXTROSE MONOHYDRATE 150; 5 MG/100ML; G/100ML
30 INJECTION, SOLUTION INTRAVENOUS CONTINUOUS
Status: DISCONTINUED | OUTPATIENT
Start: 2021-01-18 | End: 2021-01-19

## 2021-01-18 RX ORDER — MEPERIDINE HYDROCHLORIDE 50 MG/ML
25 INJECTION INTRAMUSCULAR; INTRAVENOUS; SUBCUTANEOUS
Status: DISCONTINUED | OUTPATIENT
Start: 2021-01-18 | End: 2021-01-19

## 2021-01-18 RX ORDER — MIDAZOLAM HYDROCHLORIDE 1 MG/ML
INJECTION INTRAMUSCULAR; INTRAVENOUS AS NEEDED
Status: DISCONTINUED | OUTPATIENT
Start: 2021-01-18 | End: 2021-01-18 | Stop reason: SURG

## 2021-01-18 RX ORDER — POTASSIUM CHLORIDE 29.8 MG/ML
20 INJECTION INTRAVENOUS
Status: COMPLETED | OUTPATIENT
Start: 2021-01-18 | End: 2021-01-19

## 2021-01-18 RX ORDER — ASPIRIN 81 MG/1
162 TABLET, CHEWABLE ORAL ONCE
Status: COMPLETED | OUTPATIENT
Start: 2021-01-19 | End: 2021-01-19

## 2021-01-18 RX ORDER — ALBUTEROL SULFATE 2.5 MG/3ML
2.5 SOLUTION RESPIRATORY (INHALATION) EVERY 4 HOURS PRN
Status: ACTIVE | OUTPATIENT
Start: 2021-01-18 | End: 2021-01-19

## 2021-01-18 RX ORDER — DEXTROSE MONOHYDRATE 25 G/50ML
25-50 INJECTION, SOLUTION INTRAVENOUS
Status: DISCONTINUED | OUTPATIENT
Start: 2021-01-18 | End: 2021-01-19

## 2021-01-18 RX ORDER — ACETAMINOPHEN 650 MG/1
650 SUPPOSITORY RECTAL EVERY 4 HOURS PRN
Status: DISCONTINUED | OUTPATIENT
Start: 2021-01-18 | End: 2021-01-23 | Stop reason: HOSPADM

## 2021-01-18 RX ORDER — VANCOMYCIN HYDROCHLORIDE 1 G/20ML
INJECTION, POWDER, LYOPHILIZED, FOR SOLUTION INTRAVENOUS AS NEEDED
Status: DISCONTINUED | OUTPATIENT
Start: 2021-01-18 | End: 2021-01-18 | Stop reason: SURG

## 2021-01-18 RX ORDER — SODIUM CHLORIDE, SODIUM LACTATE, POTASSIUM CHLORIDE, CALCIUM CHLORIDE 600; 310; 30; 20 MG/100ML; MG/100ML; MG/100ML; MG/100ML
INJECTION, SOLUTION INTRAVENOUS CONTINUOUS PRN
Status: DISCONTINUED | OUTPATIENT
Start: 2021-01-18 | End: 2021-01-18 | Stop reason: SURG

## 2021-01-18 RX ORDER — OXYCODONE AND ACETAMINOPHEN 10; 325 MG/1; MG/1
1 TABLET ORAL
Status: DISCONTINUED | OUTPATIENT
Start: 2021-01-18 | End: 2021-01-19

## 2021-01-18 RX ORDER — SODIUM CHLORIDE 0.9 % (FLUSH) 0.9 %
3-10 SYRINGE (ML) INJECTION AS NEEDED
Status: DISCONTINUED | OUTPATIENT
Start: 2021-01-18 | End: 2021-01-18 | Stop reason: HOSPADM

## 2021-01-18 RX ORDER — NITROGLYCERIN 20 MG/100ML
INJECTION INTRAVENOUS CONTINUOUS PRN
Status: DISCONTINUED | OUTPATIENT
Start: 2021-01-18 | End: 2021-01-18 | Stop reason: SURG

## 2021-01-18 RX ORDER — HEPARIN SODIUM 1000 [USP'U]/ML
INJECTION, SOLUTION INTRAVENOUS; SUBCUTANEOUS AS NEEDED
Status: DISCONTINUED | OUTPATIENT
Start: 2021-01-18 | End: 2021-01-18 | Stop reason: SURG

## 2021-01-18 RX ORDER — VECURONIUM BROMIDE 1 MG/ML
INJECTION, POWDER, LYOPHILIZED, FOR SOLUTION INTRAVENOUS AS NEEDED
Status: DISCONTINUED | OUTPATIENT
Start: 2021-01-18 | End: 2021-01-18 | Stop reason: SURG

## 2021-01-18 RX ORDER — OXYCODONE HYDROCHLORIDE AND ACETAMINOPHEN 5; 325 MG/1; MG/1
1 TABLET ORAL
Status: DISCONTINUED | OUTPATIENT
Start: 2021-01-18 | End: 2021-01-23 | Stop reason: HOSPADM

## 2021-01-18 RX ORDER — ATORVASTATIN CALCIUM 10 MG/1
20 TABLET, FILM COATED ORAL NIGHTLY
Status: DISCONTINUED | OUTPATIENT
Start: 2021-01-19 | End: 2021-01-22

## 2021-01-18 RX ORDER — IPRATROPIUM BROMIDE AND ALBUTEROL SULFATE 2.5; .5 MG/3ML; MG/3ML
3 SOLUTION RESPIRATORY (INHALATION)
Status: DISCONTINUED | OUTPATIENT
Start: 2021-01-18 | End: 2021-01-21

## 2021-01-18 RX ORDER — NITROGLYCERIN 20 MG/100ML
5 INJECTION INTRAVENOUS CONTINUOUS
Status: DISCONTINUED | OUTPATIENT
Start: 2021-01-18 | End: 2021-01-19

## 2021-01-18 RX ORDER — EPHEDRINE SULFATE 50 MG/ML
INJECTION, SOLUTION INTRAVENOUS AS NEEDED
Status: DISCONTINUED | OUTPATIENT
Start: 2021-01-18 | End: 2021-01-18 | Stop reason: SURG

## 2021-01-18 RX ORDER — CLOPIDOGREL BISULFATE 75 MG/1
75 TABLET ORAL DAILY
Status: DISCONTINUED | OUTPATIENT
Start: 2021-01-19 | End: 2021-01-23 | Stop reason: HOSPADM

## 2021-01-18 RX ORDER — SODIUM CHLORIDE 0.9 % (FLUSH) 0.9 %
3 SYRINGE (ML) INJECTION EVERY 12 HOURS SCHEDULED
Status: DISCONTINUED | OUTPATIENT
Start: 2021-01-18 | End: 2021-01-18 | Stop reason: HOSPADM

## 2021-01-18 RX ORDER — PROTAMINE SULFATE 10 MG/ML
INJECTION, SOLUTION INTRAVENOUS AS NEEDED
Status: DISCONTINUED | OUTPATIENT
Start: 2021-01-18 | End: 2021-01-18 | Stop reason: SURG

## 2021-01-18 RX ORDER — ETOMIDATE 2 MG/ML
INJECTION INTRAVENOUS AS NEEDED
Status: DISCONTINUED | OUTPATIENT
Start: 2021-01-18 | End: 2021-01-18 | Stop reason: SURG

## 2021-01-18 RX ORDER — BUPIVACAINE HCL/0.9 % NACL/PF 0.1 %
2 PLASTIC BAG, INJECTION (ML) EPIDURAL EVERY 8 HOURS
Status: COMPLETED | OUTPATIENT
Start: 2021-01-18 | End: 2021-01-20

## 2021-01-18 RX ORDER — LIDOCAINE HYDROCHLORIDE 20 MG/ML
INJECTION, SOLUTION EPIDURAL; INFILTRATION; INTRACAUDAL; PERINEURAL AS NEEDED
Status: DISCONTINUED | OUTPATIENT
Start: 2021-01-18 | End: 2021-01-18 | Stop reason: SURG

## 2021-01-18 RX ORDER — BUPIVACAINE HCL/0.9 % NACL/PF 0.1 %
2 PLASTIC BAG, INJECTION (ML) EPIDURAL ONCE
Status: COMPLETED | OUTPATIENT
Start: 2021-01-18 | End: 2021-01-18

## 2021-01-18 RX ORDER — CHLORHEXIDINE GLUCONATE 0.12 MG/ML
15 RINSE ORAL EVERY 12 HOURS
Status: DISCONTINUED | OUTPATIENT
Start: 2021-01-18 | End: 2021-01-20

## 2021-01-18 RX ADMIN — CHLORHEXIDINE GLUCONATE 0.12% ORAL RINSE 15 ML: 1.2 LIQUID ORAL at 17:40

## 2021-01-18 RX ADMIN — MEPERIDINE HYDROCHLORIDE 25 MG: 50 INJECTION INTRAMUSCULAR; INTRAVENOUS; SUBCUTANEOUS at 19:37

## 2021-01-18 RX ADMIN — SUFENTANIL CITRATE 0.5 MCG/KG/HR: 50 INJECTION EPIDURAL; INTRAVENOUS at 07:45

## 2021-01-18 RX ADMIN — SODIUM CHLORIDE, POTASSIUM CHLORIDE, SODIUM LACTATE AND CALCIUM CHLORIDE: 600; 310; 30; 20 INJECTION, SOLUTION INTRAVENOUS at 07:18

## 2021-01-18 RX ADMIN — MEPERIDINE HYDROCHLORIDE 25 MG: 50 INJECTION INTRAMUSCULAR; INTRAVENOUS; SUBCUTANEOUS at 22:50

## 2021-01-18 RX ADMIN — Medication 2 G: at 14:09

## 2021-01-18 RX ADMIN — VECURONIUM BROMIDE 5 MG: 1 INJECTION, POWDER, LYOPHILIZED, FOR SOLUTION INTRAVENOUS at 10:30

## 2021-01-18 RX ADMIN — CEFAZOLIN SODIUM 2 G: 10 INJECTION, POWDER, FOR SOLUTION INTRAVENOUS at 22:24

## 2021-01-18 RX ADMIN — CLEVIPIDINE 2 MG/HR: 0.5 EMULSION INTRAVENOUS at 16:11

## 2021-01-18 RX ADMIN — POTASSIUM CHLORIDE AND DEXTROSE MONOHYDRATE 30 ML/HR: 150; 5 INJECTION, SOLUTION INTRAVENOUS at 15:50

## 2021-01-18 RX ADMIN — HEPARIN SODIUM 35000 UNITS: 1000 INJECTION, SOLUTION INTRAVENOUS; SUBCUTANEOUS at 09:52

## 2021-01-18 RX ADMIN — PROPOFOL 35 MCG/KG/MIN: 10 INJECTION, EMULSION INTRAVENOUS at 19:55

## 2021-01-18 RX ADMIN — MEPERIDINE HYDROCHLORIDE 25 MG: 50 INJECTION INTRAMUSCULAR; INTRAVENOUS; SUBCUTANEOUS at 18:26

## 2021-01-18 RX ADMIN — HEPARIN SODIUM 3000 UNITS: 1000 INJECTION, SOLUTION INTRAVENOUS; SUBCUTANEOUS at 08:24

## 2021-01-18 RX ADMIN — VECURONIUM BROMIDE 5 MG: 1 INJECTION, POWDER, LYOPHILIZED, FOR SOLUTION INTRAVENOUS at 13:26

## 2021-01-18 RX ADMIN — VECURONIUM BROMIDE 10 MG: 1 INJECTION, POWDER, LYOPHILIZED, FOR SOLUTION INTRAVENOUS at 07:29

## 2021-01-18 RX ADMIN — MORPHINE SULFATE 2 MG: 2 INJECTION, SOLUTION INTRAMUSCULAR; INTRAVENOUS at 17:16

## 2021-01-18 RX ADMIN — PHENYLEPHRINE HYDROCHLORIDE 0.5 MCG/KG/MIN: 10 INJECTION INTRAVENOUS at 23:55

## 2021-01-18 RX ADMIN — VASOPRESSIN 1 ML: 20 INJECTION INTRAVENOUS at 07:40

## 2021-01-18 RX ADMIN — EPHEDRINE SULFATE 5 MG: 50 INJECTION INTRAVENOUS at 10:50

## 2021-01-18 RX ADMIN — ETOMIDATE 8 MG: 2 INJECTION, SOLUTION INTRAVENOUS at 07:29

## 2021-01-18 RX ADMIN — VASOPRESSIN 1 ML: 20 INJECTION INTRAVENOUS at 07:34

## 2021-01-18 RX ADMIN — VECURONIUM BROMIDE 5 MG: 1 INJECTION, POWDER, LYOPHILIZED, FOR SOLUTION INTRAVENOUS at 14:06

## 2021-01-18 RX ADMIN — SUFENTANIL CITRATE 100 MCG: 50 INJECTION EPIDURAL; INTRAVENOUS at 07:29

## 2021-01-18 RX ADMIN — MEPERIDINE HYDROCHLORIDE 25 MG: 50 INJECTION INTRAMUSCULAR; INTRAVENOUS; SUBCUTANEOUS at 17:18

## 2021-01-18 RX ADMIN — ACETAMINOPHEN 650 MG: 650 SUPPOSITORY RECTAL at 19:44

## 2021-01-18 RX ADMIN — Medication 2 G: at 08:21

## 2021-01-18 RX ADMIN — MIDAZOLAM 5 MG: 1 INJECTION INTRAMUSCULAR; INTRAVENOUS at 12:48

## 2021-01-18 RX ADMIN — NITROGLYCERIN 5 MCG/MIN: 20 INJECTION INTRAVENOUS at 13:14

## 2021-01-18 RX ADMIN — VANCOMYCIN HYDROCHLORIDE 1250 MG: 10 INJECTION, POWDER, LYOPHILIZED, FOR SOLUTION INTRAVENOUS at 19:57

## 2021-01-18 RX ADMIN — IPRATROPIUM BROMIDE AND ALBUTEROL SULFATE 3 ML: 2.5; .5 SOLUTION RESPIRATORY (INHALATION) at 19:30

## 2021-01-18 RX ADMIN — IPRATROPIUM BROMIDE AND ALBUTEROL SULFATE 3 ML: 2.5; .5 SOLUTION RESPIRATORY (INHALATION) at 16:39

## 2021-01-18 RX ADMIN — SODIUM CHLORIDE: 9 INJECTION, SOLUTION INTRAVENOUS at 07:38

## 2021-01-18 RX ADMIN — SUFENTANIL CITRATE 80 MCG: 50 INJECTION EPIDURAL; INTRAVENOUS at 14:16

## 2021-01-18 RX ADMIN — NITROGLYCERIN 16.67 MCG/MIN: 20 INJECTION INTRAVENOUS at 10:55

## 2021-01-18 RX ADMIN — VECURONIUM BROMIDE 10 MG: 1 INJECTION, POWDER, LYOPHILIZED, FOR SOLUTION INTRAVENOUS at 09:33

## 2021-01-18 RX ADMIN — SODIUM CHLORIDE, POTASSIUM CHLORIDE, SODIUM LACTATE AND CALCIUM CHLORIDE: 600; 310; 30; 20 INJECTION, SOLUTION INTRAVENOUS at 07:22

## 2021-01-18 RX ADMIN — AMIODARONE HYDROCHLORIDE 1 MG/MIN: 1.8 INJECTION, SOLUTION INTRAVENOUS at 17:30

## 2021-01-18 RX ADMIN — VECURONIUM BROMIDE 5 MG: 1 INJECTION, POWDER, LYOPHILIZED, FOR SOLUTION INTRAVENOUS at 10:31

## 2021-01-18 RX ADMIN — SODIUM CHLORIDE, POTASSIUM CHLORIDE, SODIUM LACTATE AND CALCIUM CHLORIDE: 600; 310; 30; 20 INJECTION, SOLUTION INTRAVENOUS at 07:32

## 2021-01-18 RX ADMIN — SODIUM CHLORIDE 2 UNITS/HR: 9 INJECTION, SOLUTION INTRAVENOUS at 08:38

## 2021-01-18 RX ADMIN — VANCOMYCIN HYDROCHLORIDE 1.25 G: 1 INJECTION, POWDER, LYOPHILIZED, FOR SOLUTION INTRAVENOUS at 08:31

## 2021-01-18 RX ADMIN — PROPOFOL 25 MCG/KG/MIN: 10 INJECTION, EMULSION INTRAVENOUS at 14:30

## 2021-01-18 RX ADMIN — LIDOCAINE HYDROCHLORIDE 100 MG: 20 INJECTION, SOLUTION EPIDURAL; INFILTRATION; INTRACAUDAL; PERINEURAL at 07:29

## 2021-01-18 RX ADMIN — VECURONIUM BROMIDE 5 MG: 1 INJECTION, POWDER, LYOPHILIZED, FOR SOLUTION INTRAVENOUS at 12:41

## 2021-01-18 RX ADMIN — Medication 1 APPLICATION: at 17:40

## 2021-01-18 RX ADMIN — MORPHINE SULFATE 2 MG: 2 INJECTION, SOLUTION INTRAMUSCULAR; INTRAVENOUS at 23:42

## 2021-01-18 RX ADMIN — AMINOCAPROIC ACID 1 G/HR: 250 INJECTION, SOLUTION INTRAVENOUS at 08:17

## 2021-01-18 RX ADMIN — MIDAZOLAM 2 MG: 1 INJECTION INTRAMUSCULAR; INTRAVENOUS at 07:18

## 2021-01-18 RX ADMIN — VASOPRESSIN 1 ML: 20 INJECTION INTRAVENOUS at 08:10

## 2021-01-18 RX ADMIN — GABAPENTIN 600 MG: 300 CAPSULE ORAL at 05:04

## 2021-01-18 RX ADMIN — MIDAZOLAM 3 MG: 1 INJECTION INTRAMUSCULAR; INTRAVENOUS at 07:23

## 2021-01-18 RX ADMIN — SODIUM CHLORIDE, POTASSIUM CHLORIDE, SODIUM LACTATE AND CALCIUM CHLORIDE: 600; 310; 30; 20 INJECTION, SOLUTION INTRAVENOUS at 12:51

## 2021-01-18 RX ADMIN — EPINEPHRINE 0.02 MCG/KG/MIN: 1 INJECTION PARENTERAL at 13:31

## 2021-01-18 RX ADMIN — METOPROLOL TARTRATE 1 MG: 1 INJECTION, SOLUTION INTRAVENOUS at 08:25

## 2021-01-18 RX ADMIN — PROTAMINE SULFATE 250 MG: 10 INJECTION, SOLUTION INTRAVENOUS at 13:54

## 2021-01-18 RX ADMIN — VASOPRESSIN 1 ML: 20 INJECTION INTRAVENOUS at 08:25

## 2021-01-18 RX ADMIN — VASOPRESSIN 1 ML: 20 INJECTION INTRAVENOUS at 07:32

## 2021-01-18 RX ADMIN — VECURONIUM BROMIDE 5 MG: 1 INJECTION, POWDER, LYOPHILIZED, FOR SOLUTION INTRAVENOUS at 11:37

## 2021-01-18 RX ADMIN — SUFENTANIL CITRATE 40 MCG: 50 INJECTION EPIDURAL; INTRAVENOUS at 09:34

## 2021-01-18 NOTE — PLAN OF CARE
Problem: Adult Inpatient Plan of Care  Goal: Plan of Care Review  Outcome: Ongoing, Progressing  Flowsheets (Taken 1/18/2021 0408)  Progress: no change  Plan of Care Reviewed With: patient  Outcome Summary: VSS. no c/o pain this shift. Room air. up ad ok. NPO since midnight, CABG in AM. G shower completed. R groin bruised, nontender. safety maintained.

## 2021-01-18 NOTE — ANESTHESIA PROCEDURE NOTES
Airway  Urgency: elective    Date/Time: 1/18/2021 7:31 AM  Airway not difficult    General Information and Staff    Patient location during procedure: OR  CRNA: Ulices Candelario CRNA    Indications and Patient Condition  Indications for airway management: airway protection    Preoxygenated: yes  Mask difficulty assessment: 2 - vent by mask + OA or adjuvant +/- NMBA    Final Airway Details  Final airway type: endotracheal airway      Successful airway: ETT  Cuffed: yes   Successful intubation technique: direct laryngoscopy  Facilitating devices/methods: intubating stylet  Endotracheal tube insertion site: oral  Blade: Gomez  Blade size: 2  ETT size (mm): 8.0  Cormack-Lehane Classification: grade I - full view of glottis  Placement verified by: chest auscultation and capnometry   Cuff volume (mL): 10  Measured from: lips  ETT/EBT  to lips (cm): 22  Number of attempts at approach: 1  Assessment: lips, teeth, and gum same as pre-op and atraumatic intubation

## 2021-01-18 NOTE — ANESTHESIA PROCEDURE NOTES
Procedure Performed: Emergent/Open-Heart Anesthesia HETAL       Start Time:  1/18/2021 7:45 AM       End Time:   1/18/2021 7:50 AM    Preanesthesia Checklist:  Patient identified, IV assessed, risks and benefits discussed, monitors and equipment assessed, procedure being performed at surgeon's request and anesthesia consent obtained.    General Procedure Information  Diagnostic Indications for Echo:  assessment of surgical repair and hemodynamic monitoring  Physician Requesting Echo: Reji Wood MD  Location performed:  OR  Intubated  Bite block not placed  Heart visualized  Probe Insertion:  Easy  Probe Type:  Multiplane  Modalities:  2D only, pulse wave Doppler, color flow mapping and continuous wave Doppler        Anesthesia Information  Performed Personally  Anesthesiologist:  Alfredito Fay MD      Echocardiogram Comments:       Routine periop HETAL

## 2021-01-18 NOTE — ANESTHESIA POSTPROCEDURE EVALUATION
"Patient: Wesley Eduardo    Procedure Summary     Date: 01/18/21 Room / Location:  [facility] OR 15 /  [facility] OR    Anesthesia Start: 0717 Anesthesia Stop: 1522    Procedure: CORONARY ARTERY BYPASS  X4. WITH LEFT INTERNAL MAMMARY ARTERY GRAFT.  LEFT EVH, RIGHT OPEN VEIN HARVEST, HETAL (N/A Chest) Diagnosis:       3-vessel coronary artery disease      (3-vessel coronary artery disease [I25.10])    Surgeon: Reji Wood MD Provider: Ulices Candelario CRNA    Anesthesia Type: general ASA Status: 4          Anesthesia Type: general    Vitals  Vitals Value Taken Time   /55 01/18/21 1520   Temp     Pulse 82 01/18/21 1525   Resp     SpO2 91 % 01/18/21 1525   Vitals shown include unvalidated device data.        Post Anesthesia Care and Evaluation    Patient location during evaluation: ICU  Patient participation: complete - patient cannot participate  Level of consciousness: obtunded/minimal responses  Pain score: 0  Pain management: adequate  Airway patency: patent  Anesthetic complications: No anesthetic complications  PONV Status: none  Cardiovascular status: acceptable  Respiratory status: acceptable  Hydration status: acceptable    Comments: Patient transported to ICU with bag ventilation and monitored vital signs that remained stable throughout transport and delivery to the unit. Oxygen sats remained 100 percent with atrial pacemaking occurring. Blood pressure 97/45, pulse 80, temperature 98 °F (36.7 °C), temperature source Oral, resp. rate 14, height 175.3 cm (69\"), weight 84.3 kg (185 lb 14.4 oz), SpO2 97 %.          "

## 2021-01-18 NOTE — PROGRESS NOTES
Patient was taken for CABG today 1/18/2021 around 6:15 AM.    CT surgery will take over as attending.      Will sign off.  Please do not hesitate to call.    Electronically signed by ANGEL Berry, 01/18/21, 9:39 AM CST.

## 2021-01-18 NOTE — INTERVAL H&P NOTE
H&P updated. The patient was examined and the following changes are noted:  He workup has been completed and he remains medically optimized.  Time has been permitted for repopulation of an effective platelet population.  Repeat Echo shows an EF that is ~40%  Risk analysis was performed using STS metrics.  He will be an increased risk candidate for morbidity and mortality. His CAD is complex.  Bypass grafting is his best treatment course for optimal long tern outcome.  He and his wife were discussed the R/B/A in detail with recent echo in mind.  All questions have been answered with he verbalizing understanding.  He has provided consent.

## 2021-01-18 NOTE — PERIOPERATIVE NURSING NOTE
Patient identified before entering OR using armbands, surgical consent verified, and armbands removed.  Cerebral oximeter pads placed on patient's forehead just above the eyebrows.  Patient transferred to OR table without difficulty. 5 lead EKG and balloon pump leads placed and covered with tape, and fast patches applied with tegaderm covers applied. Cerebral oximeter pads connected to fore site and fast patches connected to defibrillator prior to induction.      ICU NOTIFICATION  SURGERY START-0823  ON BYPASS-1051  OFF BYPASS-1348      VEIN HARVEST  START-0823  END-1029      VEIN HARVEST SITE-Open RLE  EVH- LLE      PERFORMED BYYUE Mcdonough, CST

## 2021-01-18 NOTE — ANESTHESIA PROCEDURE NOTES
Arterial Line      Patient location during procedure: holding area  Start time: 1/18/2021 7:22 AM  Stop Time:1/18/2021 7:26 AM       Line placed for hemodynamic monitoring.  Performed By   Anesthesiologist: Alfredito Fay MD  Preanesthetic Checklist  Completed: patient identified, site marked, surgical consent, pre-op evaluation, timeout performed, IV checked, risks and benefits discussed and monitors and equipment checked  Arterial Line Prep   Sterile Tech: mask, cap and gloves  Prep: ChloraPrep  Patient monitoring: continuous pulse oximetry  Arterial Line Procedure   Laterality:left  Location:  radial artery  Catheter size: 20 G   Guidance: palpation technique  Number of attempts: 1  Successful placement: yes  Post Assessment   Dressing Type: occlusive dressing applied and secured with tape.   Complications no  Circ/Move/Sens Assessment: normal.   Patient Tolerance: patient tolerated the procedure well with no apparent complications

## 2021-01-18 NOTE — ANESTHESIA PROCEDURE NOTES
Central Line      Patient location during procedure: OR  Start time: 1/18/2021 7:35 AM  Stop Time:1/18/2021 7:42 AM  Indications: central pressure monitoring, vascular access and MD/Surgeon request  Staff  Anesthesiologist: Alfredito Fay MD  Preanesthetic Checklist  Completed: patient identified, site marked, surgical consent, pre-op evaluation, timeout performed, IV checked, risks and benefits discussed and monitors and equipment checked  Central Line Prep  Sterile Tech:cap, gloves, gown, mask and sterile barriers  Prep: chloraprep  Patient monitoring: blood pressure monitoring, continuous pulse oximetry and EKG  Central Line Procedure  Laterality:right  Location:internal jugular  Catheter Type:triple lumen, MAC and Cushing-Chidi  Catheter Size:9 Fr  Guidance:ultrasound guided  Assessment  Post procedure:biopatch applied, line sutured and occlusive dressing applied  Assessement:blood return through all ports, free fluid flow, chest x-ray ordered and Gibran Test  Complications:no  Patient Tolerance:patient tolerated the procedure well with no apparent complications  Additional Notes  pcwp at 59 cm

## 2021-01-19 ENCOUNTER — APPOINTMENT (OUTPATIENT)
Dept: GENERAL RADIOLOGY | Facility: HOSPITAL | Age: 76
End: 2021-01-19

## 2021-01-19 LAB
ALBUMIN SERPL-MCNC: 3.4 G/DL (ref 3.5–5.2)
ANION GAP SERPL CALCULATED.3IONS-SCNC: 6 MMOL/L (ref 5–15)
ARTERIAL PATENCY WRIST A: ABNORMAL
ARTERIAL PATENCY WRIST A: ABNORMAL
ATMOSPHERIC PRESS: 753 MMHG
ATMOSPHERIC PRESS: 753 MMHG
BASE EXCESS BLDA CALC-SCNC: -0.9 MMOL/L (ref 0–2)
BASE EXCESS BLDA CALC-SCNC: 0.2 MMOL/L (ref 0–2)
BASOPHILS # BLD AUTO: 0.03 10*3/MM3 (ref 0–0.2)
BASOPHILS NFR BLD AUTO: 0.2 % (ref 0–1.5)
BDY SITE: ABNORMAL
BDY SITE: ABNORMAL
BODY TEMPERATURE: 37 C
BODY TEMPERATURE: 37 C
BUN SERPL-MCNC: 11 MG/DL (ref 8–23)
BUN/CREAT SERPL: 14.1 (ref 7–25)
CALCIUM SPEC-SCNC: 8.3 MG/DL (ref 8.6–10.5)
CHLORIDE SERPL-SCNC: 108 MMOL/L (ref 98–107)
CO2 SERPL-SCNC: 24 MMOL/L (ref 22–29)
CPAP: 5 CMH2O
CREAT SERPL-MCNC: 0.78 MG/DL (ref 0.76–1.27)
DEPRECATED RDW RBC AUTO: 40.6 FL (ref 37–54)
EOSINOPHIL # BLD AUTO: 0 10*3/MM3 (ref 0–0.4)
EOSINOPHIL NFR BLD AUTO: 0 % (ref 0.3–6.2)
ERYTHROCYTE [DISTWIDTH] IN BLOOD BY AUTOMATED COUNT: 13.2 % (ref 12.3–15.4)
GFR SERPL CREATININE-BSD FRML MDRD: 98 ML/MIN/1.73
GLUCOSE BLDC GLUCOMTR-MCNC: 110 MG/DL (ref 70–130)
GLUCOSE BLDC GLUCOMTR-MCNC: 114 MG/DL (ref 70–130)
GLUCOSE BLDC GLUCOMTR-MCNC: 130 MG/DL (ref 70–130)
GLUCOSE BLDC GLUCOMTR-MCNC: 136 MG/DL (ref 70–130)
GLUCOSE BLDC GLUCOMTR-MCNC: 142 MG/DL (ref 70–130)
GLUCOSE BLDC GLUCOMTR-MCNC: 144 MG/DL (ref 70–130)
GLUCOSE BLDC GLUCOMTR-MCNC: 153 MG/DL (ref 70–130)
GLUCOSE BLDC GLUCOMTR-MCNC: 155 MG/DL (ref 70–130)
GLUCOSE BLDC GLUCOMTR-MCNC: 67 MG/DL (ref 70–130)
GLUCOSE BLDC GLUCOMTR-MCNC: 86 MG/DL (ref 70–130)
GLUCOSE SERPL-MCNC: 129 MG/DL (ref 65–99)
HCO3 BLDA-SCNC: 23.1 MMOL/L (ref 20–26)
HCO3 BLDA-SCNC: 24.4 MMOL/L (ref 20–26)
HCT VFR BLD AUTO: 27.2 % (ref 37.5–51)
HGB BLD-MCNC: 9.6 G/DL (ref 13–17.7)
IMM GRANULOCYTES # BLD AUTO: 0.07 10*3/MM3 (ref 0–0.05)
IMM GRANULOCYTES NFR BLD AUTO: 0.5 % (ref 0–0.5)
INHALED O2 CONCENTRATION: 30 %
INHALED O2 CONCENTRATION: 30 %
INR PPP: 1.43 (ref 0.91–1.09)
LYMPHOCYTES # BLD AUTO: 1.13 10*3/MM3 (ref 0.7–3.1)
LYMPHOCYTES NFR BLD AUTO: 8.3 % (ref 19.6–45.3)
Lab: ABNORMAL
Lab: ABNORMAL
MAGNESIUM SERPL-MCNC: 2 MG/DL (ref 1.6–2.4)
MCH RBC QN AUTO: 29.7 PG (ref 26.6–33)
MCHC RBC AUTO-ENTMCNC: 35.3 G/DL (ref 31.5–35.7)
MCV RBC AUTO: 84.2 FL (ref 79–97)
MODALITY: ABNORMAL
MODALITY: ABNORMAL
MONOCYTES # BLD AUTO: 2.33 10*3/MM3 (ref 0.1–0.9)
MONOCYTES NFR BLD AUTO: 17.2 % (ref 5–12)
NEUTROPHILS NFR BLD AUTO: 73.8 % (ref 42.7–76)
NEUTROPHILS NFR BLD AUTO: 9.99 10*3/MM3 (ref 1.7–7)
NRBC BLD AUTO-RTO: 0 /100 WBC (ref 0–0.2)
PCO2 BLDA: 34.8 MM HG (ref 35–45)
PCO2 BLDA: 37.1 MM HG (ref 35–45)
PCO2 TEMP ADJ BLD: 34.8 MM HG (ref 35–45)
PCO2 TEMP ADJ BLD: 37.1 MM HG (ref 35–45)
PEEP RESPIRATORY: 5 CM[H2O]
PH BLDA: 7.43 PH UNITS (ref 7.35–7.45)
PH BLDA: 7.43 PH UNITS (ref 7.35–7.45)
PH, TEMP CORRECTED: 7.43 PH UNITS (ref 7.35–7.45)
PH, TEMP CORRECTED: 7.43 PH UNITS (ref 7.35–7.45)
PHOSPHATE SERPL-MCNC: 3 MG/DL (ref 2.5–4.5)
PLATELET # BLD AUTO: 171 10*3/MM3 (ref 140–450)
PMV BLD AUTO: 10.7 FL (ref 6–12)
PO2 BLDA: 75.9 MM HG (ref 83–108)
PO2 BLDA: 83.3 MM HG (ref 83–108)
PO2 TEMP ADJ BLD: 75.9 MM HG (ref 83–108)
PO2 TEMP ADJ BLD: 83.3 MM HG (ref 83–108)
POTASSIUM SERPL-SCNC: 4 MMOL/L (ref 3.5–5.2)
PROTHROMBIN TIME: 17.1 SECONDS (ref 11.9–14.6)
PSV: 10 CMH2O
PSV: 10 CMH2O
QT INTERVAL: 254 MS
QT INTERVAL: 470 MS
QT INTERVAL: 588 MS
QTC INTERVAL: 348 MS
QTC INTERVAL: 424 MS
QTC INTERVAL: 630 MS
RBC # BLD AUTO: 3.23 10*6/MM3 (ref 4.14–5.8)
SAO2 % BLDCOA: 96.5 % (ref 94–99)
SAO2 % BLDCOA: 97.6 % (ref 94–99)
SET MECH RESP RATE: 18
SODIUM SERPL-SCNC: 138 MMOL/L (ref 136–145)
VENTILATOR MODE: ABNORMAL
VENTILATOR MODE: ABNORMAL
VT ON VENT VENT: 600 ML
WBC # BLD AUTO: 13.55 10*3/MM3 (ref 3.4–10.8)

## 2021-01-19 PROCEDURE — 94799 UNLISTED PULMONARY SVC/PX: CPT

## 2021-01-19 PROCEDURE — 85610 PROTHROMBIN TIME: CPT | Performed by: THORACIC SURGERY (CARDIOTHORACIC VASCULAR SURGERY)

## 2021-01-19 PROCEDURE — 93010 ELECTROCARDIOGRAM REPORT: CPT | Performed by: INTERNAL MEDICINE

## 2021-01-19 PROCEDURE — 82803 BLOOD GASES ANY COMBINATION: CPT

## 2021-01-19 PROCEDURE — 25010000002 CEFAZOLIN PER 500 MG: Performed by: NURSE PRACTITIONER

## 2021-01-19 PROCEDURE — 25010000002 ENOXAPARIN PER 10 MG: Performed by: NURSE PRACTITIONER

## 2021-01-19 PROCEDURE — 25010000002 ENOXAPARIN PER 10 MG: Performed by: THORACIC SURGERY (CARDIOTHORACIC VASCULAR SURGERY)

## 2021-01-19 PROCEDURE — 25010000003 POTASSIUM CHLORIDE PER 2 MEQ: Performed by: THORACIC SURGERY (CARDIOTHORACIC VASCULAR SURGERY)

## 2021-01-19 PROCEDURE — 99024 POSTOP FOLLOW-UP VISIT: CPT | Performed by: NURSE PRACTITIONER

## 2021-01-19 PROCEDURE — P9041 ALBUMIN (HUMAN),5%, 50ML: HCPCS | Performed by: THORACIC SURGERY (CARDIOTHORACIC VASCULAR SURGERY)

## 2021-01-19 PROCEDURE — 71045 X-RAY EXAM CHEST 1 VIEW: CPT

## 2021-01-19 PROCEDURE — 80069 RENAL FUNCTION PANEL: CPT | Performed by: THORACIC SURGERY (CARDIOTHORACIC VASCULAR SURGERY)

## 2021-01-19 PROCEDURE — 25010000002 VANCOMYCIN 10 G RECONSTITUTED SOLUTION: Performed by: NURSE PRACTITIONER

## 2021-01-19 PROCEDURE — 25010000002 CEFAZOLIN PER 500 MG: Performed by: THORACIC SURGERY (CARDIOTHORACIC VASCULAR SURGERY)

## 2021-01-19 PROCEDURE — 25010000002 VANCOMYCIN 10 G RECONSTITUTED SOLUTION: Performed by: THORACIC SURGERY (CARDIOTHORACIC VASCULAR SURGERY)

## 2021-01-19 PROCEDURE — 93005 ELECTROCARDIOGRAM TRACING: CPT | Performed by: THORACIC SURGERY (CARDIOTHORACIC VASCULAR SURGERY)

## 2021-01-19 PROCEDURE — 25010000002 FUROSEMIDE PER 20 MG: Performed by: NURSE PRACTITIONER

## 2021-01-19 PROCEDURE — 25010000002 ALBUMIN HUMAN 5% PER 50 ML: Performed by: THORACIC SURGERY (CARDIOTHORACIC VASCULAR SURGERY)

## 2021-01-19 PROCEDURE — 83735 ASSAY OF MAGNESIUM: CPT | Performed by: THORACIC SURGERY (CARDIOTHORACIC VASCULAR SURGERY)

## 2021-01-19 PROCEDURE — 85025 COMPLETE CBC W/AUTO DIFF WBC: CPT | Performed by: THORACIC SURGERY (CARDIOTHORACIC VASCULAR SURGERY)

## 2021-01-19 PROCEDURE — 25010000002 MORPHINE SULFATE (PF) 2 MG/ML SOLUTION: Performed by: THORACIC SURGERY (CARDIOTHORACIC VASCULAR SURGERY)

## 2021-01-19 PROCEDURE — 97162 PT EVAL MOD COMPLEX 30 MIN: CPT

## 2021-01-19 PROCEDURE — 82962 GLUCOSE BLOOD TEST: CPT

## 2021-01-19 PROCEDURE — 97116 GAIT TRAINING THERAPY: CPT

## 2021-01-19 RX ORDER — FUROSEMIDE 10 MG/ML
20 INJECTION INTRAMUSCULAR; INTRAVENOUS ONCE
Status: COMPLETED | OUTPATIENT
Start: 2021-01-19 | End: 2021-01-19

## 2021-01-19 RX ORDER — FUROSEMIDE 10 MG/ML
20 INJECTION INTRAMUSCULAR; INTRAVENOUS
Status: DISCONTINUED | OUTPATIENT
Start: 2021-01-19 | End: 2021-01-20

## 2021-01-19 RX ORDER — FAMOTIDINE 20 MG/1
20 TABLET, FILM COATED ORAL DAILY
Status: DISCONTINUED | OUTPATIENT
Start: 2021-01-19 | End: 2021-01-23 | Stop reason: HOSPADM

## 2021-01-19 RX ORDER — LIDOCAINE 50 MG/G
2 PATCH TOPICAL
Status: DISCONTINUED | OUTPATIENT
Start: 2021-01-19 | End: 2021-01-23 | Stop reason: HOSPADM

## 2021-01-19 RX ORDER — POTASSIUM CHLORIDE 750 MG/1
20 CAPSULE, EXTENDED RELEASE ORAL 2 TIMES DAILY WITH MEALS
Status: DISCONTINUED | OUTPATIENT
Start: 2021-01-19 | End: 2021-01-20

## 2021-01-19 RX ORDER — TAMSULOSIN HYDROCHLORIDE 0.4 MG/1
0.4 CAPSULE ORAL NIGHTLY
Status: DISCONTINUED | OUTPATIENT
Start: 2021-01-19 | End: 2021-01-23 | Stop reason: HOSPADM

## 2021-01-19 RX ADMIN — IPRATROPIUM BROMIDE AND ALBUTEROL SULFATE 3 ML: 2.5; .5 SOLUTION RESPIRATORY (INHALATION) at 19:39

## 2021-01-19 RX ADMIN — ALBUMIN HUMAN 500 ML: 0.05 INJECTION, SOLUTION INTRAVENOUS at 00:34

## 2021-01-19 RX ADMIN — MORPHINE SULFATE 2 MG: 2 INJECTION, SOLUTION INTRAMUSCULAR; INTRAVENOUS at 02:27

## 2021-01-19 RX ADMIN — Medication 1 APPLICATION: at 17:29

## 2021-01-19 RX ADMIN — POTASSIUM CHLORIDE 20 MEQ: 29.8 INJECTION, SOLUTION INTRAVENOUS at 06:49

## 2021-01-19 RX ADMIN — ENOXAPARIN SODIUM 30 MG: 30 INJECTION SUBCUTANEOUS at 21:05

## 2021-01-19 RX ADMIN — OXYCODONE HYDROCHLORIDE AND ACETAMINOPHEN 1 TABLET: 5; 325 TABLET ORAL at 16:17

## 2021-01-19 RX ADMIN — OXYCODONE HYDROCHLORIDE AND ACETAMINOPHEN 1 TABLET: 5; 325 TABLET ORAL at 04:41

## 2021-01-19 RX ADMIN — ATORVASTATIN CALCIUM 20 MG: 10 TABLET, FILM COATED ORAL at 21:04

## 2021-01-19 RX ADMIN — FUROSEMIDE 20 MG: 10 INJECTION, SOLUTION INTRAVENOUS at 09:02

## 2021-01-19 RX ADMIN — AMIODARONE HYDROCHLORIDE 400 MG: 200 TABLET ORAL at 09:09

## 2021-01-19 RX ADMIN — IPRATROPIUM BROMIDE AND ALBUTEROL SULFATE 3 ML: 2.5; .5 SOLUTION RESPIRATORY (INHALATION) at 10:49

## 2021-01-19 RX ADMIN — FAMOTIDINE 20 MG: 20 TABLET, FILM COATED ORAL at 15:15

## 2021-01-19 RX ADMIN — CEFAZOLIN SODIUM 2 G: 10 INJECTION, POWDER, FOR SOLUTION INTRAVENOUS at 21:05

## 2021-01-19 RX ADMIN — IPRATROPIUM BROMIDE AND ALBUTEROL SULFATE 3 ML: 2.5; .5 SOLUTION RESPIRATORY (INHALATION) at 06:09

## 2021-01-19 RX ADMIN — LIDOCAINE 2 PATCH: 50 PATCH CUTANEOUS at 16:45

## 2021-01-19 RX ADMIN — CEFAZOLIN SODIUM 2 G: 10 INJECTION, POWDER, FOR SOLUTION INTRAVENOUS at 06:36

## 2021-01-19 RX ADMIN — CHLORHEXIDINE GLUCONATE 0.12% ORAL RINSE 15 ML: 1.2 LIQUID ORAL at 17:29

## 2021-01-19 RX ADMIN — CLOPIDOGREL 75 MG: 75 TABLET, FILM COATED ORAL at 17:29

## 2021-01-19 RX ADMIN — VANCOMYCIN HYDROCHLORIDE 1250 MG: 10 INJECTION, POWDER, LYOPHILIZED, FOR SOLUTION INTRAVENOUS at 09:09

## 2021-01-19 RX ADMIN — Medication 1 APPLICATION: at 06:36

## 2021-01-19 RX ADMIN — VANCOMYCIN HYDROCHLORIDE 1250 MG: 10 INJECTION, POWDER, LYOPHILIZED, FOR SOLUTION INTRAVENOUS at 21:05

## 2021-01-19 RX ADMIN — OXYCODONE HYDROCHLORIDE AND ACETAMINOPHEN 1 TABLET: 5; 325 TABLET ORAL at 22:46

## 2021-01-19 RX ADMIN — CHLORHEXIDINE GLUCONATE 0.12% ORAL RINSE 15 ML: 1.2 LIQUID ORAL at 06:36

## 2021-01-19 RX ADMIN — POTASSIUM CHLORIDE 20 MEQ: 750 CAPSULE, EXTENDED RELEASE ORAL at 12:46

## 2021-01-19 RX ADMIN — METOPROLOL TARTRATE 12.5 MG: 25 TABLET, FILM COATED ORAL at 09:09

## 2021-01-19 RX ADMIN — AMIODARONE HYDROCHLORIDE 400 MG: 200 TABLET ORAL at 17:29

## 2021-01-19 RX ADMIN — ENOXAPARIN SODIUM 30 MG: 30 INJECTION SUBCUTANEOUS at 09:09

## 2021-01-19 RX ADMIN — CEFAZOLIN SODIUM 2 G: 10 INJECTION, POWDER, FOR SOLUTION INTRAVENOUS at 15:03

## 2021-01-19 RX ADMIN — ASPIRIN 162 MG: 81 TABLET, CHEWABLE ORAL at 09:09

## 2021-01-19 RX ADMIN — IPRATROPIUM BROMIDE AND ALBUTEROL SULFATE 3 ML: 2.5; .5 SOLUTION RESPIRATORY (INHALATION) at 15:12

## 2021-01-19 RX ADMIN — FUROSEMIDE 20 MG: 10 INJECTION, SOLUTION INTRAVENOUS at 17:35

## 2021-01-19 RX ADMIN — DEXTROSE MONOHYDRATE 50 ML: 25 INJECTION, SOLUTION INTRAVENOUS at 06:13

## 2021-01-19 RX ADMIN — OXYCODONE HYDROCHLORIDE AND ACETAMINOPHEN 1 TABLET: 5; 325 TABLET ORAL at 19:25

## 2021-01-19 RX ADMIN — METOPROLOL TARTRATE 12.5 MG: 25 TABLET, FILM COATED ORAL at 21:04

## 2021-01-19 RX ADMIN — OXYCODONE HYDROCHLORIDE AND ACETAMINOPHEN 1 TABLET: 5; 325 TABLET ORAL at 08:03

## 2021-01-19 RX ADMIN — TAMSULOSIN HYDROCHLORIDE 0.4 MG: 0.4 CAPSULE ORAL at 21:04

## 2021-01-19 NOTE — PROGRESS NOTES
"CABG-4V, Left endoscopic vein harvest, right open vein harvest    POD 1    Extubated overnight to 3 liters nasal cannula. Just walked with physical therapy. Up in the chair. Good pain control. IS ~750 ml. O2 sat dropped to 85-86% after walking. Denies shortness of breath.     Telemetry: sinus 70s  IV gtts: Insulin, Neosynephrine, IVF  Hemodynamics: CO 5.9, CI 2.86, CVP 6    Visit Vitals  /48   Pulse 73   Temp 98.5 °F (36.9 °C) (Oral)   Resp 20   Ht 175.3 cm (69\")   Wt 91.4 kg (201 lb 9.6 oz)   SpO2 93%   BMI 29.77 kg/m²   3LNC  Baseline weight 185 pounds     Intake/Output Summary (Last 24 hours) at 1/19/2021 0952  Last data filed at 1/19/2021 0841  Gross per 24 hour   Intake 6670.43 ml   Output 4128 ml   Net 2542.43 ml     Mediastinal tube output: 216 ml/24 hours  Left Arthur drain: 47 ml/24 hours     Labs:      Chest x-ray: appropriate support lines, no pneumothorax, left basilar atelectasis     Physical Exam:  General: No apparent distress. In good spirits. Up in the chair.  Cardiovascular: Regular rate and rhythm without murmur, rubs, or gallops.    Pulmonary: Clear to auscultation bilaterally without wheezing, rubs, or rales.  Chest: Sternotomy incision clean, dry, and intact. Sternum stable. No clicks. Mediastinal tubes x 2 and Arthur drain x 1 with dressing clean, dry, and intact.    Abdomen: Soft, non distended and non tender.  Extremities: Warm, moves all extremities. Saphenectomy sites clean, dry, and intact with ace wraps.    Neurologic: Grossly intact with no focal deficits.       Impression:  Coronary artery disease   Acute systolic congestive heart failure (CMS/HCC)  NSTEMI (non-ST elevated myocardial infarction) (CMS/HCC)  Acute pulmonary edema (CMS/HCC)  Diabetes mellitus (CMS/HCC)  Hypertension  Hyperlipidemia  Coronary artery disease, multivessel  Aspirin like platelet function defect  Bilateral carotid artery stenosis, asymptomatic  Hypoxia    Plan:  Neosynephrine gtt turned off  Stat " CXR  Diurese  Continue routine post cardiac surgery regimen  DW patient and nursing  Keep in ICU for now, reassess this afternoon

## 2021-01-19 NOTE — PLAN OF CARE
Goal Outcome Evaluation:  Plan of Care Reviewed With: other (see comments)  Progress: no change   Patient stood with CGA. Ambulated 200' with 1 standing rest and Minimal assist. Patient developed a R lean snf thru walk but was able to correct when cued. SAT on 3lpm began at 92%, dropped to 86%. Will benefit from safety education and increased ambulation.   Patient/Family

## 2021-01-19 NOTE — PROGRESS NOTES
Continued Stay Note   [place]     Patient Name: Wesley Eduardo  MRN: [id number]  Today's Date: 1/19/2021    Admit Date: 1/7/2021    Discharge Plan     Row Name 01/19/21 0850       Plan    Plan  Patient now in ICU post op.  SW will follow to assist with plan/needs.        Discharge Codes    No documentation.             RUBIN Contreras

## 2021-01-19 NOTE — PLAN OF CARE
Goal Outcome Evaluation:  Plan of Care Reviewed With: other (see comments)  Progress: no change  Outcome Summary: Ntn follow up. Pt s/p CABG x 4. NPO await diet advancement. Oral intake avg 58% of three meals prior to surgery. Cont to follow for plan of care.

## 2021-01-19 NOTE — THERAPY EVALUATION
Patient Name: Wesley Eduardo  : 1945    MRN: [id number]                              Today's Date: 2021       Admit Date: 2021    Visit Dx:     ICD-10-CM ICD-9-CM   1. NSTEMI (non-ST elevated myocardial infarction) (CMS/Formerly [facility])  I21.4 410.70   2. Acute congestive heart failure, unspecified heart failure type (CMS/[facility]ly [facility])  I50.9 428.0   3. 3-vessel coronary artery disease  I25.10 414.00   4. Impaired mobility  Z74.09 799.89     Patient Active Problem List   Diagnosis   • NSTEMI (non-ST elevated myocardial infarction) (CMS/[facility]ly [facility])   • Type 2 diabetes mellitus, without long-term current use of insulin (CMS/[facility]ly [facility])   • Hypertension   • Hyperlipidemia   • Acute systolic congestive heart failure (CMS/[facility])   • 3-vessel coronary artery disease   • Hyponatremia     Past Medical History:   Diagnosis Date   • Diabetes mellitus (CMS/[facility])    • Hyperlipidemia    • Hypertension      Past Surgical History:   Procedure Laterality Date   • CARDIAC CATHETERIZATION N/A 2021    Procedure: Left Heart Cath;  Surgeon: Cade Hong MD;  Location:  [facility] CATH [facility]AS[facility];  Service: Cardiology;  Laterality: N/A;   • CORONARY ARTERY BYPASS GRAFT N/A 2021    Procedure: CORONARY ARTERY BYPASS  X4. WITH LEFT INTERNAL MAMMARY ARTERY GRAFT.  LEFT EVH, RIGHT OPEN VEIN HARVEST, HETAL;  Surgeon: Reji Wood MD;  Location:  [facility] OR;  Service: Cardiothoracic;  Laterality: N/A;   • HEMORRHOIDECTOMY       General Information     Row Name 21 0753          Physical Therapy Time and Intention    Document Type  evaluation CC: NASH. Dx: CABG x 4 with B EVH. Ordered by Dr. Wood. Chest tubes.  -FREDDIE (r) CINDI (t) FREDDIE (c)     Mode of Treatment  physical therapy  -FREDDIE (r) JJORDAN (t) FREDDIE (c)     Row Name 21 0753          General Information    Patient Profile Reviewed  yes  -FREDDIE (r) JJ (t) FREDDIE (c)     Prior Level of Function  independent:;all household mobility;gait;ADL's  -FREDDIE (r) JJ (t) FREDDIE (c)     Existing Precautions/Restrictions   sternal;fall chest tube  -FREDDIE     Barriers to Rehab  medically complex  -FREDDIE (r) JJ (t) FREDDIE (c)     Row Name 01/19/21 0753          Living Environment    Lives With  spouse  -FREDDIE (r) JJ (t) FREDDIE (c)     Row Name 01/19/21 0753          Home Main Entrance    Number of Stairs, Main Entrance  four  -FREDDIE (r) JJ (t) FREDDIE (c)     Stair Railings, Main Entrance  none  -FREDDIE (r) JJ (t) FREDDIE (c)     Row Name 01/19/21 0753          Stairs Within Home, Primary    Number of Stairs, Within Home, Primary  none  -FREDDIE (r) JJ (t) FREDDIE (c)     Stair Railings, Within Home, Primary  none  -FREDDIE (r) JJ (t) FREDDIE (c)     Row Name 01/19/21 0753          Cognition    Orientation Status (Cognition)  oriented x 4  -FREDDIE (r) JJ (t) FREDDIE (c)     Row Name 01/19/21 0753          Safety Issues, Functional Mobility    Impairments Affecting Function (Mobility)  balance;endurance/activity tolerance;shortness of breath;strength;pain  -FREDDIE (r) JJ (t) FREDDIE (c)       User Key  (r) = Recorded By, (t) = Taken By, (c) = Cosigned By    Initials Name Provider Type    Terrance Hall, PT DPT Physical Therapist    Irwin Meadows, PT Student PT Student        Mobility     Row Name 01/19/21 0753          Bed Mobility    Bed Mobility  scooting/bridging;supine-sit;sit-supine  -FREDDIE (r) JJ (t) FREDDIE (c)     Scooting/Bridging [place] (Bed Mobility)  contact guard  -FREDDIE (r) JJ (t) FREDDIE (c)     Supine-Sit [place] (Bed Mobility)  minimum assist (75% patient effort);2 person assist  -FREDDIE (r) JJ (t) FREDDIE (c)     Row Name 01/19/21 0753          Bed-Chair Transfer    Bed-Chair [place] (Transfers)  minimum assist (75% patient effort);2 person assist  -FREDDIE (r) JJ (t) FREDDIE (c)     Row Name 01/19/21 0753          Sit-Stand Transfer    Sit-Stand [place] (Transfers)  2 person assist;moderate assist (50% patient effort)  -FREDDIE (r) JJ (t) FREDDIE (c)     Row Name 01/19/21 0753          Gait/Stairs (Locomotion)    [place] Level (Gait)  minimum assist (75% patient effort);2 person assist  -FREDDIE PUENTE (r)  (t) FREDDIE (c)     Distance in Feet (Gait)  100  -FREDDIE (r) JJ (t) FREDDIE (c)     Deviations/Abnormal Patterns (Gait)  base of support, narrow;stepan decreased;gait speed decreased;festinating/shuffling;stride length decreased  -FREDDIE (r) JJ (t) FREDDIE (c)       User Key  (r) = Recorded By, (t) = Taken By, (c) = Cosigned By    Initials Name Provider Type    Terrance Hall, PT DPT Physical Therapist    Irwin Meadows, PT Student PT Student        Obj/Interventions     [facility] Name 01/19/21 0753          Range of Motion Comprehensive    General Range of Motion  bilateral lower extremity ROM WFL  -FREDDIE (r) JJ (t) FREDDIE (c)     Row Name 01/19/21 0753          Strength Comprehensive (MMT)    Comment, General Manual Muscle Testing (MMT) Assessment  B LE functionally 3+/5  -FREDDIE (r) JJ (t) FREDDIE (c)     Row Name 01/19/21 0753          Balance    Balance Assessment  sitting static balance;standing static balance  -FREDDIE (r) JJ (t) FREDDIE (c)     Static Sitting Balance  WFL;unsupported;sitting, edge of bed  -FREDDIE (r) JJ (t) FREDDIE (c)     Static Standing Balance  mild impairment;supported;standing  -FREDDIE (r) JJ (t) FREDDIE (c)       User Key  (r) = Recorded By, (t) = Taken By, (c) = Cosigned By    Initials Name Provider Type    Terrance Hall, PT DPT Physical Therapist    Irwin Meadows, PT Student PT Student        Goals/Plan     Row Name 01/19/21 0753          Bed Mobility Goal 1 (PT)    Activity/Assistive Device (Bed Mobility Goal 1, PT)  sit to supine;supine to sit  -FREDDIE (r) JJ (t) FREDDIE (c)     [place] Level/Cues Needed (Bed Mobility Goal 1, PT)  contact guard assist  -FREDDIE (r) JJ (t) FREDDIE (c)     Time Frame (Bed Mobility Goal 1, PT)  long term goal (LTG);10 days  -FREDDIE (r) JJ (t) FREDDIE (c)     Progress/Outcomes (Bed Mobility Goal 1, PT)  goal ongoing  -FREDDIE (r) JJ (t) FREDDIE (c)     Row Name 01/19/21 0753          Transfer Goal 1 (PT)    Activity/Assistive Device (Transfer Goal 1, PT)  sit-to-stand/stand-to-sit;bed-to-chair/chair-to-bed  -FREDDIE (r) CINDI (t) FREDDIE (c)      [place] Level/Cues Needed (Transfer Goal 1, PT)  contact guard assist  -FREDDIE (r) JJ (t) FREDDIE (c)     Time Frame (Transfer Goal 1, PT)  long term goal (LTG);10 days  -FREDDIE (r) JJ (t) FREDDIE (c)     Progress/Outcome (Transfer Goal 1, PT)  goal ongoing  -FREDDIE (r) JJ (t) FREDDIE (c)     Row Name 01/19/21 0753          Gait Training Goal 1 (PT)    Activity/Assistive Device (Gait Training Goal 1, PT)  gait (walking locomotion);decrease fall risk;improve balance and speed;increase endurance/gait distance;turning, left;turning, right  -FREDDIE (r) JJ (t) FREDDIE (c)     [place] Level (Gait Training Goal 1, PT)  supervision required  -FREDDIE (r) JJ (t) FREDDIE (c)     Distance (Gait Training Goal 1, PT)  200  -FREDDIE (r) JJ (t) FREDDIE (c)     Time Frame (Gait Training Goal 1, PT)  long term goal (LTG);10 days  -FREDDIE (r) JJ (t) FREDDIE (c)     Progress/Outcome (Gait Training Goal 1, PT)  goal ongoing  -FREDDIE (r) JJ (t) FREDDIE (c)     Row Name 01/19/21 0758          Stairs Goal 1 (PT)    Activity/Assistive Device (Stairs Goal 1, PT)  stairs, all skills;decrease fall risk;improve balance and speed  -FREDDIE (r) JJ (t) FREDDIE (c)     [place] Level/Cues Needed (Stairs Goal 1, PT)  supervision required  -FREDDIE (r) JJ (t) FREDDIE (c)     Number of Stairs (Stairs Goal 1, PT)  3-5  (Pended)   -JJ     Time Frame (Stairs Goal 1, PT)  long term goal (LTG);10 days  -FREDDIE (r) JJ (t) FREDDIE (c)     Progress/Outcome (Stairs Goal 1, PT)  goal ongoing  -FREDDIE (r) JJ (t) FREDDIE (c)     Row Name 01/19/21 0753          Patient Education Goal (PT)    Activity (Patient Education Goal, PT)  sternal precautions  -FREDDIE (r) JJ (t) FREDDIE (c)     [place]/Cues/Accuracy (Memory Goal 2, PT)  demonstrates adequately  -FREDDIE (r) JJ (t) FREDDIE (c)     Time Frame (Patient Education Goal, PT)  long term goal (LTG);10 days  -FREDDIE (r) JJ (t) FREDDIE (c)     Progress/Outcome (Patient Education Goal, PT)  goal ongoing  -FREDDIE (r) JJ (t) FREDDIE (c)       User Key  (r) = Recorded By, (t) = Taken By, (c) = Cosigned By    Initials Name Provider Type    FREDDIE  Terrance Troncoso, PT DPT Physical Therapist    Irwin Meadows, PT Student PT Student        Clinical Impression     Row Name 01/19/21 0752          Pain    Additional Documentation  Pain Scale: Numbers Pre/Post-Treatment (Group)  -FREDDIE (r) CINDI (t) FREDDIE (c)     Row Name 01/19/21 0752          Pain Scale: Numbers Pre/Post-Treatment    Pretreatment Pain Rating  5/10  -FREDDIE (r) CINDI (t) FREDDIE (c)     Posttreatment Pain Rating  4/10  -FREDDIE (r) CINDI (t) FREDDIE (c)     Pain Location  chest  -FREDDIE (r) CINDI (t) FREDDIE (c)     Pre/Posttreatment Pain Comment  at incisional site  -FREDDIE (r) CINDI (t) FREDDIE (c)     Pain Intervention(s)  Ambulation/increased activity;Repositioned  -FREDDIE (r) CINDI (t) FREDDIE (c)     Row Name 01/19/21 0752          Plan of Care Review    Plan of Care Reviewed With  patient  -FREDDIE (r) CINDI (philip) FREDDIE (verónica)     Outcome Summary  PT eval completed. Patient is s/p CABG x 4 with 1 chest tube, on 3L O2 and reports 5/10 chest pain at surgical site. He lives with his spouse where they have 4 steps to enter the home and none inside. He was indep in all ADLs before coming into the hospital and didn't require any DME. He has a cane at home, but no other DME. He is supine in shen's in bed upon PT arrival for eval. He was able to scoot to EOB with CGA and required min assist x 2 to achieve supine-sit at EOB. He was able to sit-stand with min assist x 2 as well as ambulate 100' with min assist x 2. He was weighed during ambulation at 201.8. He was wobbly while ambulating and needed mod assist when turning. He was on 3L O2 while ambulating and had no issues with desat. He was transferred to the chair wiht min assist x 2 where he was left with nsg. He shows decreased balance, endurance and SOB while ambulating. His strength is grossly 3+/5. PT plan is to see him 2x/day until D/C home with assist from family.  -FREDDIE (r) CINDI (philip) FREDDIE (c)     Row Name 01/19/21 0759          Therapy Assessment/Plan (PT)    Patient/Family Therapy Goals Statement (PT)  improve balance,  strength and endurance  -FREDDIE (r) JJ (t) FREDDIE (c)     Rehab Potential (PT)  good, to achieve stated therapy goals  -FREDDIE (r) JJ (t) FREDDIE (c)     Criteria for Skilled Interventions Met (PT)  yes;skilled treatment is necessary  -FREDDIE (r) JJ (t) FREDDIE (c)     Predicted Duration of Therapy Intervention (PT)  until D/C  -FREDDIE (r) JJ (t) FREDDIE (c)     Row Name 01/19/21 0753          Vital Signs    Pre Systolic BP Rehab  119  -FREDDIE (r) JJ (t) FREDDIE (c)     Pre Treatment Diastolic BP  57  -FREDDIE (r) JJ (t) FREDDIE (c)     Post Systolic BP Rehab  133  -FREDDIE (r) JJ (t) FREDDIE (c)     Post Treatment Diastolic BP  61  -FREDDIE (r) JJ (t) FREDDIE (c)     Pretreatment Heart Rate (beats/min)  70  -FREDDIE (r) JJ (t) FREDDIE (c)     Posttreatment Heart Rate (beats/min)  73  -FREDDIE (r) JJ (t) FREDDIE (c)     Pre SpO2 (%)  88  -FREDDIE (r) JJ (t) FREDDIE (c)     O2 Delivery Pre Treatment  supplemental O2 3L  -FREDDIE (r) JJ (t) FREDDIE (c)     O2 Delivery Intra Treatment  supplemental O2 3L  -FREDDIE (r) JJ (t) FREDDIE (c)     Post SpO2 (%)  92  -FREDDIE (r) JJ (t) FREDDIE (c)     O2 Delivery Post Treatment  supplemental O2 3L  -FREDDIE (r) JJ (t) FREDDIE (c)     Pre Patient Position  Supine  -FREDDIE (r) JJ (t) FREDDIE (c)     Intra Patient Position  Standing  -FREDDIE (r) JJ (t) FREDDIE (c)     Post Patient Position  Sitting  -FREDDIE (r) JJ (t) FREDDIE (c)     Row Name 01/19/21 0753          Positioning and Restraints    Pre-Treatment Position  in bed  -FREDDIE (r) JJ (t) FREDDIE (c)     Post Treatment Position  chair  -FREDDIE (r) JJ (t) FREDDIE (c)     In Chair  sitting;with nsg;with other staff;call light within reach;encouraged to call for assist  -FREDDIE       User Key  (r) = Recorded By, (t) = Taken By, (c) = Cosigned By    Initials Name Provider Type    Terrance Hall, PT DPT Physical Therapist    Irwin Meadows, SIVAN Student PT Student        Outcome Measures     Row Name 01/19/21 0857          How much help from another person do you currently need...    Turning from your back to your side while in flat bed without using bedrails?  3  -FREDDIE (r) CINDI (t) FREDDIE (c)     Moving from lying on  back to sitting on the side of a flat bed without bedrails?  3  -FREDDIE (r) JJ (t) FREDDIE (c)     Moving to and from a bed to a chair (including a wheelchair)?  2  -FREDDIE (r) JJ (t) FREDDIE (c)     Standing up from a chair using your arms (e.g., wheelchair, bedside chair)?  1  -FREDDIE (r) JJ (t) FREDDIE (c)     Climbing 3-5 steps with a railing?  2  -FREDDIE (r) JJ (t) FREDDIE (c)     To walk in hospital room?  2  -FREDDIE (r) JJ (t) FREDDIE (c)     AM-PAC 6 Clicks Score (PT)  13  -VS (r) JJ (t)     Row Name 01/19/21 0857          Functional Assessment    Outcome Measure Options  AM-PAC 6 Clicks Basic Mobility (PT)  -FREDDIE (r) JJ (t) FREDDIE (c)       User Key  (r) = Recorded By, (t) = Taken By, (c) = Cosigned By    Initials Name Provider Type    Terrance Hall, PT DPT Physical Therapist    Opal Gray RN Registered Nurse    Irwin Meadows, PT Student PT Student        Physical Therapy Education                 Title: PT OT SLP Therapies (In Progress)     Topic: Physical Therapy (In Progress)     Point: Mobility training (Not Started)     Learner Progress:  Not documented in this visit.          Point: Home exercise program (Not Started)     Learner Progress:  Not documented in this visit.          Point: Body mechanics (Done)     Learning Progress Summary           Patient Acceptance, E, VU by CINDI at 1/19/2021 0753    Comment: pt educated on sternal precautions, postural re-ed while ambulating                   Point: Precautions (Done)     Learning Progress Summary           Patient Acceptance, E, VU by CINDI at 1/19/2021 0753    Comment: pt educated on sternal precautions, postural re-ed while ambulating                               User Key     Initials Effective Dates Name Provider Type Discipline    CINDI 12/23/20 -  Irwin Rosas, PT Student PT Student PT              PT Recommendation and Plan  Planned Therapy Interventions (PT): balance training, bed mobility training, gait training, home exercise program, patient/family education, transfer  training, postural re-education, stair training, strengthening  Plan of Care Reviewed With: patient  Outcome Summary: PT eval completed. Patient is s/p CABG x 4 with 1 chest tube, on 3L O2 and reports 5/10 chest pain at surgical site. He lives with his spouse where they have 4 steps to enter the home and none inside. He was indep in all ADLs before coming into the hospital and didn't require any DME. He has a cane at home, but no other DME. He is supine in shen's in bed upon PT arrival for eval. He was able to scoot to EOB with CGA and required min assist x 2 to achieve supine-sit at EOB. He was able to sit-stand with min assist x 2 as well as ambulate 100' with min assist x 2. He was weighed during ambulation at 201.8. He was wobbly while ambulating and needed mod assist when turning. He was on 3L O2 while ambulating and had no issues with desat. He was transferred to the chair wiht min assist x 2 where he was left with nsg. He shows decreased balance, endurance and SOB while ambulating. His strength is grossly 3+/5. PT plan is to see him 2x/day until D/C home with assist from family.     Time Calculation:   PT Charges     Row Name 01/19/21 1336 01/19/21 0753          Time Calculation    Start Time  1304  -VIKA  0753  -FREDDIE (r) JJ (t) FREDDIE (c)     Stop Time  1329  -VIKA  0840  -FREDDIE (r) JJ (t) FREDDIE (c)     Time Calculation (min)  25 min  -VIKA  47 min  -FREDDIE (r) JJ (t)     PT Received On  --  01/19/21  -FREDDIE (r) JJ (t) FREDDIE (c)     PT Goal Re-Cert Due Date  --  01/29/21  -FREDDIE (r) JJ (t) FREDDIE (c)        Timed Charges    41354 - Gait Training Minutes   25  -VIKA  --       User Key  (r) = Recorded By, (t) = Taken By, (c) = Cosigned By    Initials Name Provider Type    Terrance Hall, PT DPT Physical Therapist    Kirsten Fox, PTA Physical Therapy Assistant    Irwin Meadows, PT Student PT Student            PT G-Codes  Outcome Measure Options: AM-PAC 6 Clicks Basic Mobility (PT)  AM-PAC 6 Clicks Score (PT): 13    Irwin  Ralph, PT Student  1/19/2021

## 2021-01-19 NOTE — PLAN OF CARE
Goal Outcome Evaluation:  Plan of Care Reviewed With: other (see comments)  Progress: no change  Outcome Summary: PT eval completed. Patient is s/p CABG x 4 with 1 chest tube, on 3L O2 and reports 5/10 chest pain at surgical site. He lives with his spouse where they have 4 steps to enter the home and none inside. He was indep in all ADLs before coming into the hospital and didn't require any DME. He has a cane at home, but no other DME. He is supine in shen's in bed upon PT arrival for eval. He was able to scoot to EOB with CGA and required min assist x 2 to achieve supine-sit at EOB. He was able to sit-stand with min assist x 2 as well as ambulate 100' with min assist x 2. He was weighed during ambulation at 201.8. He was wobbly while ambulating and needed mod assist when turning. He was on 3L O2 while ambulating and had no issues with desat. He was transferred to the chair wiht min assist x 2 where he was left with nsg. He shows decreased balance, endurance and SOB while ambulating. His strength is grossly 3+/5. PT plan is to see him 2x/day until D/C home with assist from family.

## 2021-01-20 ENCOUNTER — APPOINTMENT (OUTPATIENT)
Dept: GENERAL RADIOLOGY | Facility: HOSPITAL | Age: 76
End: 2021-01-20

## 2021-01-20 LAB
ANION GAP SERPL CALCULATED.3IONS-SCNC: 11 MMOL/L (ref 5–15)
BH BB BLOOD EXPIRATION DATE: NORMAL
BH BB BLOOD EXPIRATION DATE: NORMAL
BH BB BLOOD TYPE BARCODE: 9500
BH BB BLOOD TYPE BARCODE: 9500
BH BB DISPENSE STATUS: NORMAL
BH BB DISPENSE STATUS: NORMAL
BH BB PRODUCT CODE: NORMAL
BH BB PRODUCT CODE: NORMAL
BH BB UNIT NUMBER: NORMAL
BH BB UNIT NUMBER: NORMAL
BUN SERPL-MCNC: 12 MG/DL (ref 8–23)
BUN/CREAT SERPL: 16.9 (ref 7–25)
CALCIUM SPEC-SCNC: 8.9 MG/DL (ref 8.6–10.5)
CHLORIDE SERPL-SCNC: 98 MMOL/L (ref 98–107)
CO2 SERPL-SCNC: 24 MMOL/L (ref 22–29)
CREAT SERPL-MCNC: 0.71 MG/DL (ref 0.76–1.27)
CROSSMATCH INTERPRETATION: NORMAL
CROSSMATCH INTERPRETATION: NORMAL
DEPRECATED RDW RBC AUTO: 40.8 FL (ref 37–54)
ERYTHROCYTE [DISTWIDTH] IN BLOOD BY AUTOMATED COUNT: 13.2 % (ref 12.3–15.4)
GFR SERPL CREATININE-BSD FRML MDRD: 109 ML/MIN/1.73
GLUCOSE BLDC GLUCOMTR-MCNC: 188 MG/DL (ref 70–130)
GLUCOSE BLDC GLUCOMTR-MCNC: 227 MG/DL (ref 70–130)
GLUCOSE BLDC GLUCOMTR-MCNC: 240 MG/DL (ref 70–130)
GLUCOSE BLDC GLUCOMTR-MCNC: 253 MG/DL (ref 70–130)
GLUCOSE SERPL-MCNC: 251 MG/DL (ref 65–99)
HCT VFR BLD AUTO: 30.1 % (ref 37.5–51)
HGB BLD-MCNC: 10.3 G/DL (ref 13–17.7)
MCH RBC QN AUTO: 29.3 PG (ref 26.6–33)
MCHC RBC AUTO-ENTMCNC: 34.2 G/DL (ref 31.5–35.7)
MCV RBC AUTO: 85.8 FL (ref 79–97)
PLATELET # BLD AUTO: 160 10*3/MM3 (ref 140–450)
PMV BLD AUTO: 11 FL (ref 6–12)
POTASSIUM SERPL-SCNC: 4.3 MMOL/L (ref 3.5–5.2)
RBC # BLD AUTO: 3.51 10*6/MM3 (ref 4.14–5.8)
SODIUM SERPL-SCNC: 133 MMOL/L (ref 136–145)
UNIT  ABO: NORMAL
UNIT  ABO: NORMAL
UNIT  RH: NORMAL
UNIT  RH: NORMAL
WBC # BLD AUTO: 13.97 10*3/MM3 (ref 3.4–10.8)

## 2021-01-20 PROCEDURE — 85027 COMPLETE CBC AUTOMATED: CPT | Performed by: NURSE PRACTITIONER

## 2021-01-20 PROCEDURE — 71045 X-RAY EXAM CHEST 1 VIEW: CPT

## 2021-01-20 PROCEDURE — 82962 GLUCOSE BLOOD TEST: CPT

## 2021-01-20 PROCEDURE — 97116 GAIT TRAINING THERAPY: CPT

## 2021-01-20 PROCEDURE — 25010000002 ENOXAPARIN PER 10 MG: Performed by: NURSE PRACTITIONER

## 2021-01-20 PROCEDURE — 25010000002 CEFAZOLIN PER 500 MG: Performed by: NURSE PRACTITIONER

## 2021-01-20 PROCEDURE — 93010 ELECTROCARDIOGRAM REPORT: CPT | Performed by: INTERNAL MEDICINE

## 2021-01-20 PROCEDURE — 93005 ELECTROCARDIOGRAM TRACING: CPT | Performed by: THORACIC SURGERY (CARDIOTHORACIC VASCULAR SURGERY)

## 2021-01-20 PROCEDURE — 25010000002 FUROSEMIDE PER 20 MG: Performed by: NURSE PRACTITIONER

## 2021-01-20 PROCEDURE — 94799 UNLISTED PULMONARY SVC/PX: CPT

## 2021-01-20 PROCEDURE — 80048 BASIC METABOLIC PNL TOTAL CA: CPT | Performed by: NURSE PRACTITIONER

## 2021-01-20 PROCEDURE — 99024 POSTOP FOLLOW-UP VISIT: CPT | Performed by: NURSE PRACTITIONER

## 2021-01-20 PROCEDURE — 63710000001 INSULIN LISPRO (HUMAN) PER 5 UNITS: Performed by: NURSE PRACTITIONER

## 2021-01-20 RX ORDER — DEXTROSE MONOHYDRATE 25 G/50ML
25 INJECTION, SOLUTION INTRAVENOUS
Status: DISCONTINUED | OUTPATIENT
Start: 2021-01-20 | End: 2021-01-23 | Stop reason: HOSPADM

## 2021-01-20 RX ORDER — NICOTINE POLACRILEX 4 MG
15 LOZENGE BUCCAL
Status: DISCONTINUED | OUTPATIENT
Start: 2021-01-20 | End: 2021-01-23 | Stop reason: HOSPADM

## 2021-01-20 RX ORDER — FUROSEMIDE 10 MG/ML
20 INJECTION INTRAMUSCULAR; INTRAVENOUS
Status: DISCONTINUED | OUTPATIENT
Start: 2021-01-20 | End: 2021-01-21

## 2021-01-20 RX ORDER — POLYETHYLENE GLYCOL 3350 17 G/17G
17 POWDER, FOR SOLUTION ORAL DAILY
Status: DISCONTINUED | OUTPATIENT
Start: 2021-01-20 | End: 2021-01-23 | Stop reason: HOSPADM

## 2021-01-20 RX ORDER — BISACODYL 10 MG
10 SUPPOSITORY, RECTAL RECTAL DAILY PRN
Status: DISCONTINUED | OUTPATIENT
Start: 2021-01-20 | End: 2021-01-23 | Stop reason: HOSPADM

## 2021-01-20 RX ORDER — BISACODYL 5 MG/1
10 TABLET, DELAYED RELEASE ORAL 2 TIMES DAILY
Status: DISCONTINUED | OUTPATIENT
Start: 2021-01-20 | End: 2021-01-23 | Stop reason: HOSPADM

## 2021-01-20 RX ORDER — RAMIPRIL 1.25 MG/1
1.25 CAPSULE ORAL
Status: DISCONTINUED | OUTPATIENT
Start: 2021-01-20 | End: 2021-01-21

## 2021-01-20 RX ORDER — POTASSIUM CHLORIDE 750 MG/1
20 CAPSULE, EXTENDED RELEASE ORAL
Status: DISCONTINUED | OUTPATIENT
Start: 2021-01-20 | End: 2021-01-23 | Stop reason: HOSPADM

## 2021-01-20 RX ADMIN — POTASSIUM CHLORIDE 20 MEQ: 750 CAPSULE, EXTENDED RELEASE ORAL at 18:03

## 2021-01-20 RX ADMIN — INSULIN LISPRO 6 UNITS: 100 INJECTION, SOLUTION INTRAVENOUS; SUBCUTANEOUS at 12:37

## 2021-01-20 RX ADMIN — OXYCODONE HYDROCHLORIDE AND ACETAMINOPHEN 1 TABLET: 5; 325 TABLET ORAL at 04:35

## 2021-01-20 RX ADMIN — TAMSULOSIN HYDROCHLORIDE 0.4 MG: 0.4 CAPSULE ORAL at 20:59

## 2021-01-20 RX ADMIN — RAMIPRIL 1.25 MG: 1.25 CAPSULE ORAL at 11:35

## 2021-01-20 RX ADMIN — IPRATROPIUM BROMIDE AND ALBUTEROL SULFATE 3 ML: 2.5; .5 SOLUTION RESPIRATORY (INHALATION) at 20:00

## 2021-01-20 RX ADMIN — POTASSIUM CHLORIDE 20 MEQ: 750 CAPSULE, EXTENDED RELEASE ORAL at 12:38

## 2021-01-20 RX ADMIN — METFORMIN HYDROCHLORIDE 1000 MG: 500 TABLET ORAL at 17:18

## 2021-01-20 RX ADMIN — FUROSEMIDE 20 MG: 10 INJECTION, SOLUTION INTRAVENOUS at 08:59

## 2021-01-20 RX ADMIN — POTASSIUM CHLORIDE 20 MEQ: 750 CAPSULE, EXTENDED RELEASE ORAL at 08:59

## 2021-01-20 RX ADMIN — ATORVASTATIN CALCIUM 20 MG: 10 TABLET, FILM COATED ORAL at 20:58

## 2021-01-20 RX ADMIN — INSULIN LISPRO 4 UNITS: 100 INJECTION, SOLUTION INTRAVENOUS; SUBCUTANEOUS at 08:58

## 2021-01-20 RX ADMIN — ENOXAPARIN SODIUM 30 MG: 30 INJECTION SUBCUTANEOUS at 08:58

## 2021-01-20 RX ADMIN — IPRATROPIUM BROMIDE AND ALBUTEROL SULFATE 3 ML: 2.5; .5 SOLUTION RESPIRATORY (INHALATION) at 07:56

## 2021-01-20 RX ADMIN — METFORMIN HYDROCHLORIDE 1000 MG: 500 TABLET ORAL at 11:35

## 2021-01-20 RX ADMIN — FAMOTIDINE 20 MG: 20 TABLET, FILM COATED ORAL at 08:59

## 2021-01-20 RX ADMIN — CHLORHEXIDINE GLUCONATE 0.12% ORAL RINSE 15 ML: 1.2 LIQUID ORAL at 06:01

## 2021-01-20 RX ADMIN — BISACODYL 10 MG: 5 TABLET ORAL at 20:59

## 2021-01-20 RX ADMIN — IPRATROPIUM BROMIDE AND ALBUTEROL SULFATE 3 ML: 2.5; .5 SOLUTION RESPIRATORY (INHALATION) at 14:13

## 2021-01-20 RX ADMIN — POLYETHYLENE GLYCOL 3350 17 G: 17 POWDER, FOR SOLUTION ORAL at 12:37

## 2021-01-20 RX ADMIN — ENOXAPARIN SODIUM 30 MG: 30 INJECTION SUBCUTANEOUS at 20:59

## 2021-01-20 RX ADMIN — Medication 1 APPLICATION: at 06:01

## 2021-01-20 RX ADMIN — BISACODYL 10 MG: 5 TABLET ORAL at 11:35

## 2021-01-20 RX ADMIN — ASPIRIN 81 MG: 81 TABLET, COATED ORAL at 08:59

## 2021-01-20 RX ADMIN — LIDOCAINE 2 PATCH: 50 PATCH CUTANEOUS at 11:27

## 2021-01-20 RX ADMIN — METOPROLOL TARTRATE 25 MG: 25 TABLET, FILM COATED ORAL at 08:59

## 2021-01-20 RX ADMIN — OXYCODONE HYDROCHLORIDE AND ACETAMINOPHEN 1 TABLET: 5; 325 TABLET ORAL at 11:33

## 2021-01-20 RX ADMIN — METOPROLOL TARTRATE 25 MG: 25 TABLET, FILM COATED ORAL at 20:59

## 2021-01-20 RX ADMIN — FUROSEMIDE 20 MG: 10 INJECTION, SOLUTION INTRAVENOUS at 18:08

## 2021-01-20 RX ADMIN — AMIODARONE HYDROCHLORIDE 400 MG: 200 TABLET ORAL at 17:18

## 2021-01-20 RX ADMIN — OXYCODONE HYDROCHLORIDE AND ACETAMINOPHEN 1 TABLET: 5; 325 TABLET ORAL at 07:24

## 2021-01-20 RX ADMIN — AMIODARONE HYDROCHLORIDE 400 MG: 200 TABLET ORAL at 08:59

## 2021-01-20 RX ADMIN — INSULIN LISPRO 4 UNITS: 100 INJECTION, SOLUTION INTRAVENOUS; SUBCUTANEOUS at 17:57

## 2021-01-20 RX ADMIN — OXYCODONE HYDROCHLORIDE AND ACETAMINOPHEN 1 TABLET: 5; 325 TABLET ORAL at 01:45

## 2021-01-20 RX ADMIN — CLOPIDOGREL 75 MG: 75 TABLET, FILM COATED ORAL at 17:19

## 2021-01-20 RX ADMIN — FUROSEMIDE 20 MG: 10 INJECTION, SOLUTION INTRAVENOUS at 12:38

## 2021-01-20 RX ADMIN — OXYCODONE HYDROCHLORIDE AND ACETAMINOPHEN 1 TABLET: 5; 325 TABLET ORAL at 19:39

## 2021-01-20 RX ADMIN — CEFAZOLIN SODIUM 2 G: 10 INJECTION, POWDER, FOR SOLUTION INTRAVENOUS at 06:01

## 2021-01-20 RX ADMIN — OXYCODONE HYDROCHLORIDE AND ACETAMINOPHEN 1 TABLET: 5; 325 TABLET ORAL at 15:58

## 2021-01-20 NOTE — PLAN OF CARE
Goal Outcome Evaluation:  Plan of Care Reviewed With: patient  Progress: improving   Patient stands, sits and ambulates 240' with CGA and 1 standing rest. RA SAT 87-88%. Left pt. On 1 lpm. Instruct to work on incentive spirometer. Reviewed sternal precautions. Will benefit from safety instruction and increased ambulation.

## 2021-01-20 NOTE — PROGRESS NOTES
Continued Stay Note   [facility]     Patient Name: Wesley Eduardo  MRN: [id number]  Today's Date: 1/20/2021    Admit Date: 1/7/2021    Discharge Plan     Row Name 01/20/21 0832       Plan    Plan  Home    Patient/Family in Agreement with Plan  yes    Plan Comments  Chart reviewed; events noted.  Pt doing well with therapy and they are recommending dc home.        Discharge Codes    No documentation.             RUBIN Jurado

## 2021-01-20 NOTE — PROGRESS NOTES
"CABG-4V, Left endoscopic vein harvest, right open vein harvest    POD 2    Up in the chair. Had a good night. On 2 liters nasal cannula with O2 sat 96%. IS ~ 750 ml. Rates pain 4/10. Due to walk. Weight unchanged.     Telemetry: sinus 74-86 bpm    Visit Vitals  /55 (BP Location: Left arm, Patient Position: Sitting)   Pulse 81   Temp 98 °F (36.7 °C) (Oral)   Resp 18   Ht 175.3 cm (69.02\")   Wt 91.5 kg (201 lb 12.8 oz)   SpO2 96%   BMI 29.79 kg/m²   2LNC  Baseline weight 185 pounds     Intake/Output Summary (Last 24 hours) at 1/20/2021 0911  Last data filed at 1/20/2021 0601  Gross per 24 hour   Intake 604.53 ml   Output 2490 ml   Net -1885.47 ml     Mediastinal tube output: 110 ml/24 hours  Left Suzanne drain: 30 ml/24 hours     Labs:      Chest x-ray: hypoaeration, bibasilar atelectasis with bilateral pleural effusions, no pneumothorax    Physical Exam:  General: No apparent distress. In good spirits. Up in the chair.  Cardiovascular: Regular rate and rhythm without murmur, rubs, or gallops.    Pulmonary: Clear to auscultation bilaterally without wheezing, rubs, or rales.  Chest: Sternotomy incision clean, dry, and intact. Sternum stable. No clicks. Mediastinal tubes x 2 and Suzanne drain x 1 with dressing clean, dry, and intact.    Abdomen: Soft, non distended and non tender.  Extremities: Warm, moves all extremities. Saphenectomy sites clean, dry, and intact.   Neurologic: Grossly intact with no focal deficits.       Impression:  Coronary artery disease   Acute systolic congestive heart failure (CMS/HCC)  NSTEMI (non-ST elevated myocardial infarction) (CMS/HCC)  Acute pulmonary edema (CMS/HCC)  Diabetes mellitus (CMS/HCC)  Hypertension  Hyperlipidemia  Coronary artery disease, multivessel  Aspirin like platelet function defect  Bilateral carotid artery stenosis, asymptomatic  Hypoxia    Plan:  Intensify diuresis  Keep mediastinal and suzanne drain  Bowel regimen  Add ACE inhibitor  Continue routine post cardiac " surgery regimen  DW patient   Anticipate home in next couple days

## 2021-01-20 NOTE — PLAN OF CARE
Goal Outcome Evaluation:  Plan of Care Reviewed With: patient  Progress: improving    Outcome Summary: Pt was up in chair and on 1L 02. Pt walked 200ft x1 standing rest cga on 2L 02. Pt's post 02 90% and pt needed min x 1 sit to supine. RN gave the OK for pt to lay back in bed. Pt would benfit from continued PT.

## 2021-01-20 NOTE — THERAPY TREATMENT NOTE
Acute Care - Physical Therapy Treatment Note  [facility]     Patient Name: Wesley Eduardo  : 1945  MRN: [id number]  Today's Date: 2021           PT Assessment (last 12 hours)      PT Evaluation and Treatment     Row Name 21 0910          Physical Therapy Time and Intention    Subjective Information  complains of;pain  -AE     Document Type  therapy note (daily note)  -AE     Mode of Treatment  physical therapy  -AE     Row Name 21 0910          General Information    Existing Precautions/Restrictions  fall;oxygen therapy device and L/min chest tube  -AE     Row Name 21 0910          Pain Scale: Numbers Pre/Post-Treatment    Pretreatment Pain Rating  4/10  -AE     Posttreatment Pain Rating  4/10  -AE     Pain Location - Orientation  incisional  -AE     Pain Location  chest  -AE     Row Name 21 0910          Bed Mobility    Sit-Supine [place] (Bed Mobility)  minimum assist (75% patient effort);verbal cues  -AE     Comment (Bed Mobility)  up in chair  -AE     Row Name 21 0910          Transfers    Sit-Stand [place] (Transfers)  contact guard;verbal cues  -AE     Stand-Sit [place] (Transfers)  minimum assist (75% patient effort);verbal cues  -AE     Row Name 21 0910          Gait/Stairs (Locomotion)    [place] Level (Gait)  minimum assist (75% patient effort)  -AE     Distance in Feet (Gait)  200 x 1 standing rest  -AE     Deviations/Abnormal Patterns (Gait)  base of support, wide  -AE     Row Name 21 0910          Hip (Therapeutic Exercise)    Hip (Therapeutic Exercise)  --  -AE     Row Name 21 0910          Knee (Therapeutic Exercise)    Knee (Therapeutic Exercise)  AROM (active range of motion)  -AE     Knee AROM (Therapeutic Exercise)  LAQ (long arc quad) cardiac protocol x 10-15 reps  -AE     Row Name 21 0910          Ankle (Therapeutic Exercise)    Ankle (Therapeutic Exercise)  AROM (active range of motion)  -AE     Ankle AROM  (Therapeutic Exercise)  dorsiflexion;plantarflexion  -AE     Row Name             Wound 01/18/21 0823 Left leg Incision    Wound - Properties Group Placement Date: 01/18/21  -DT Placement Time: 0823  -DT Present on Hospital Admission: N  -DT Side: Left  -DT Location: leg  -DT Primary Wound Type: Incision  -DT    Retired Wound - Properties Group Date first assessed: 01/18/21  -DT Time first assessed: 0823  -DT Present on Hospital Admission: N  -DT Side: Left  -DT Location: leg  -DT Primary Wound Type: Incision  -DT    Row Name             Wound 01/18/21 0940 sternal Incision    Wound - Properties Group Placement Date: 01/18/21  -DT Placement Time: 0940  -DT Present on Hospital Admission: N  -DT Location: sternal  -DT Primary Wound Type: Incision  -DT    Retired Wound - Properties Group Date first assessed: 01/18/21  -DT Time first assessed: 0940  -DT Present on Hospital Admission: N  -DT Location: sternal  -DT Primary Wound Type: Incision  -DT    Row Name             Wound 01/18/21 0953 Right lower leg Incision    Wound - Properties Group Placement Date: 01/18/21  -DT Placement Time: 0953  -DT Present on Hospital Admission: N  -DT Side: Right  -DT Orientation: lower  -DT Location: leg  -DT Primary Wound Type: Incision  -DT    Retired Wound - Properties Group Date first assessed: 01/18/21  -DT Time first assessed: 0953  -DT Present on Hospital Admission: N  -DT Side: Right  -DT Location: leg  -DT Primary Wound Type: Incision  -DT    Row Name 01/20/21 0910          Vital Signs    Pre SpO2 (%)  95  -AE     O2 Delivery Pre Treatment  supplemental O2 1L  -AE     Intra SpO2 (%)  89  -AE     O2 Delivery Intra Treatment  supplemental O2 2L  -AE     Post SpO2 (%)  92  -AE     O2 Delivery Post Treatment  supplemental O2 1L  -AE     Row Name 01/20/21 0910          Positioning and Restraints    Pre-Treatment Position  sitting in chair/recliner  -AE     Post Treatment Position  bed  -AE     In Bed  fowlers;call light within reach   -AE       User Key  (r) = Recorded By, (t) = Taken By, (c) = Cosigned By    Initials Name Provider Type    AE Yane Vora, YARA Physical Therapy Assistant    Orville Morales, RN Registered Nurse        Physical Therapy Education                 Title: PT OT SLP Therapies (In Progress)     Topic: Physical Therapy (In Progress)     Point: Mobility training (Not Started)     Learner Progress:  Not documented in this visit.          Point: Home exercise program (Not Started)     Learner Progress:  Not documented in this visit.          Point: Body mechanics (Done)     Learning Progress Summary           Patient Acceptance, E, VU by  at 1/19/2021 0753    Comment: pt educated on sternal precautions, postural re-ed while ambulating                   Point: Precautions (Done)     Learning Progress Summary           Patient Acceptance, E, VU by  at 1/19/2021 0753    Comment: pt educated on sternal precautions, postural re-ed while ambulating                               User Key     Initials Effective Dates Name Provider Type Discipline     12/23/20 -  Irwin Rosas, PT Student PT Student PT              PT Recommendation and Plan     Plan of Care Reviewed With: patient  Progress: improving  Outcome Summary: Pt was up in chair and on 1L 02. Pt walked 200ft x1 standing rest cga on 2L 02. Pt's post 02 90% and pt needed min x 1 sit to supine. RN gave the OK for pt to lay back in bed. Pt would beenfit from continued PT.       Time Calculation:   PT Charges     Row Name 01/20/21 0946             Time Calculation    Start Time  0910  -AE      Stop Time  0933  -AE      Time Calculation (min)  23 min  -AE      PT Received On  01/20/21  -AE      PT Goal Re-Cert Due Date  01/29/21  -AE         Time Calculation- PT    Total Timed Code Minutes- PT  23 minute(s)  -AE         Timed Charges    69041 - Gait Training Minutes   23  -AE        User Key  (r) = Recorded By, (t) = Taken By, (c) = Cosigned By    Initials Name  Provider Type    AE Yane Vora PTA Physical Therapy Assistant        Therapy Charges for Today     Code Description Service Date Service Provider Modifiers Qty    41172803707 HC GAIT TRAINING EA 15 MIN 1/20/2021 Yane Vora PTA GP 2          PT G-Codes  Outcome Measure Options: AM-PAC 6 Clicks Basic Mobility (PT)  AM-PAC 6 Clicks Score (PT): 13    Yane Vora PTA  1/20/2021

## 2021-01-20 NOTE — PLAN OF CARE
Goal Outcome Evaluation:  Plan of Care Reviewed With: patient  Progress: improving   Patient transfers and ambulates SBA for 260'. Tolerating well.

## 2021-01-20 NOTE — PLAN OF CARE
Problem: Adult Inpatient Plan of Care  Goal: Plan of Care Review  Outcome: Ongoing, Progressing  Flowsheets (Taken 1/19/2021 1832)  Progress: no change  Plan of Care Reviewed With: patient  Outcome Summary: Pt transferred from ICU to 4B. Mild pain at incision site. CT and VIKA in place. Rios pulled in unit and pt voided x1. 2 L NC. Dressings CDI. NSR on tele. Oral intake encouraged.

## 2021-01-21 ENCOUNTER — APPOINTMENT (OUTPATIENT)
Dept: GENERAL RADIOLOGY | Facility: HOSPITAL | Age: 76
End: 2021-01-21

## 2021-01-21 LAB
ANION GAP SERPL CALCULATED.3IONS-SCNC: 11 MMOL/L (ref 5–15)
BUN SERPL-MCNC: 16 MG/DL (ref 8–23)
BUN/CREAT SERPL: 25 (ref 7–25)
CALCIUM SPEC-SCNC: 9 MG/DL (ref 8.6–10.5)
CHLORIDE SERPL-SCNC: 96 MMOL/L (ref 98–107)
CO2 SERPL-SCNC: 26 MMOL/L (ref 22–29)
CREAT SERPL-MCNC: 0.64 MG/DL (ref 0.76–1.27)
DEPRECATED RDW RBC AUTO: 39 FL (ref 37–54)
ERYTHROCYTE [DISTWIDTH] IN BLOOD BY AUTOMATED COUNT: 12.9 % (ref 12.3–15.4)
GFR SERPL CREATININE-BSD FRML MDRD: 123 ML/MIN/1.73
GLUCOSE BLDC GLUCOMTR-MCNC: 176 MG/DL (ref 70–130)
GLUCOSE BLDC GLUCOMTR-MCNC: 208 MG/DL (ref 70–130)
GLUCOSE BLDC GLUCOMTR-MCNC: 269 MG/DL (ref 70–130)
GLUCOSE BLDC GLUCOMTR-MCNC: 295 MG/DL (ref 70–130)
GLUCOSE SERPL-MCNC: 201 MG/DL (ref 65–99)
HCT VFR BLD AUTO: 33.2 % (ref 37.5–51)
HGB BLD-MCNC: 11.6 G/DL (ref 13–17.7)
MCH RBC QN AUTO: 29.3 PG (ref 26.6–33)
MCHC RBC AUTO-ENTMCNC: 34.9 G/DL (ref 31.5–35.7)
MCV RBC AUTO: 83.8 FL (ref 79–97)
PLATELET # BLD AUTO: 226 10*3/MM3 (ref 140–450)
PMV BLD AUTO: 11 FL (ref 6–12)
POTASSIUM SERPL-SCNC: 4 MMOL/L (ref 3.5–5.2)
RBC # BLD AUTO: 3.96 10*6/MM3 (ref 4.14–5.8)
SODIUM SERPL-SCNC: 133 MMOL/L (ref 136–145)
WBC # BLD AUTO: 15.57 10*3/MM3 (ref 3.4–10.8)

## 2021-01-21 PROCEDURE — 25010000002 ENOXAPARIN PER 10 MG: Performed by: NURSE PRACTITIONER

## 2021-01-21 PROCEDURE — 80048 BASIC METABOLIC PNL TOTAL CA: CPT | Performed by: NURSE PRACTITIONER

## 2021-01-21 PROCEDURE — 93010 ELECTROCARDIOGRAM REPORT: CPT | Performed by: INTERNAL MEDICINE

## 2021-01-21 PROCEDURE — 63710000001 INSULIN LISPRO (HUMAN) PER 5 UNITS: Performed by: NURSE PRACTITIONER

## 2021-01-21 PROCEDURE — 97116 GAIT TRAINING THERAPY: CPT

## 2021-01-21 PROCEDURE — 25010000002 ONDANSETRON PER 1 MG: Performed by: NURSE PRACTITIONER

## 2021-01-21 PROCEDURE — 25010000002 FUROSEMIDE PER 20 MG: Performed by: THORACIC SURGERY (CARDIOTHORACIC VASCULAR SURGERY)

## 2021-01-21 PROCEDURE — 93005 ELECTROCARDIOGRAM TRACING: CPT | Performed by: THORACIC SURGERY (CARDIOTHORACIC VASCULAR SURGERY)

## 2021-01-21 PROCEDURE — 94799 UNLISTED PULMONARY SVC/PX: CPT

## 2021-01-21 PROCEDURE — 82962 GLUCOSE BLOOD TEST: CPT

## 2021-01-21 PROCEDURE — 85027 COMPLETE CBC AUTOMATED: CPT | Performed by: NURSE PRACTITIONER

## 2021-01-21 PROCEDURE — 25010000002 FUROSEMIDE PER 20 MG: Performed by: NURSE PRACTITIONER

## 2021-01-21 PROCEDURE — 71046 X-RAY EXAM CHEST 2 VIEWS: CPT

## 2021-01-21 RX ORDER — FUROSEMIDE 10 MG/ML
20 INJECTION INTRAMUSCULAR; INTRAVENOUS
Status: DISCONTINUED | OUTPATIENT
Start: 2021-01-21 | End: 2021-01-23 | Stop reason: HOSPADM

## 2021-01-21 RX ORDER — RAMIPRIL 1.25 MG/1
1.25 CAPSULE ORAL ONCE
Status: COMPLETED | OUTPATIENT
Start: 2021-01-21 | End: 2021-01-21

## 2021-01-21 RX ORDER — RAMIPRIL 1.25 MG/1
2.5 CAPSULE ORAL
Status: DISCONTINUED | OUTPATIENT
Start: 2021-01-22 | End: 2021-01-22

## 2021-01-21 RX ADMIN — POTASSIUM CHLORIDE 20 MEQ: 750 CAPSULE, EXTENDED RELEASE ORAL at 12:11

## 2021-01-21 RX ADMIN — ASPIRIN 81 MG: 81 TABLET, COATED ORAL at 08:23

## 2021-01-21 RX ADMIN — INSULIN LISPRO 6 UNITS: 100 INJECTION, SOLUTION INTRAVENOUS; SUBCUTANEOUS at 17:19

## 2021-01-21 RX ADMIN — TAMSULOSIN HYDROCHLORIDE 0.4 MG: 0.4 CAPSULE ORAL at 20:39

## 2021-01-21 RX ADMIN — OXYCODONE HYDROCHLORIDE AND ACETAMINOPHEN 1 TABLET: 5; 325 TABLET ORAL at 14:05

## 2021-01-21 RX ADMIN — RAMIPRIL 1.25 MG: 1.25 CAPSULE ORAL at 12:10

## 2021-01-21 RX ADMIN — FUROSEMIDE 20 MG: 10 INJECTION, SOLUTION INTRAVENOUS at 08:24

## 2021-01-21 RX ADMIN — FUROSEMIDE 20 MG: 10 INJECTION, SOLUTION INTRAVENOUS at 12:11

## 2021-01-21 RX ADMIN — OXYCODONE HYDROCHLORIDE AND ACETAMINOPHEN 1 TABLET: 5; 325 TABLET ORAL at 00:07

## 2021-01-21 RX ADMIN — POTASSIUM CHLORIDE 20 MEQ: 750 CAPSULE, EXTENDED RELEASE ORAL at 17:18

## 2021-01-21 RX ADMIN — ENOXAPARIN SODIUM 30 MG: 30 INJECTION SUBCUTANEOUS at 08:29

## 2021-01-21 RX ADMIN — ATORVASTATIN CALCIUM 20 MG: 10 TABLET, FILM COATED ORAL at 20:39

## 2021-01-21 RX ADMIN — ONDANSETRON HYDROCHLORIDE 4 MG: 2 SOLUTION INTRAMUSCULAR; INTRAVENOUS at 15:09

## 2021-01-21 RX ADMIN — FAMOTIDINE 20 MG: 20 TABLET, FILM COATED ORAL at 08:23

## 2021-01-21 RX ADMIN — AMIODARONE HYDROCHLORIDE 400 MG: 200 TABLET ORAL at 17:18

## 2021-01-21 RX ADMIN — METFORMIN HYDROCHLORIDE 1000 MG: 500 TABLET ORAL at 08:24

## 2021-01-21 RX ADMIN — OXYCODONE HYDROCHLORIDE AND ACETAMINOPHEN 1 TABLET: 5; 325 TABLET ORAL at 09:54

## 2021-01-21 RX ADMIN — RAMIPRIL 1.25 MG: 1.25 CAPSULE ORAL at 08:24

## 2021-01-21 RX ADMIN — OXYCODONE HYDROCHLORIDE AND ACETAMINOPHEN 1 TABLET: 5; 325 TABLET ORAL at 20:39

## 2021-01-21 RX ADMIN — FUROSEMIDE 20 MG: 10 INJECTION, SOLUTION INTRAVENOUS at 17:18

## 2021-01-21 RX ADMIN — BISACODYL 10 MG: 10 SUPPOSITORY RECTAL at 07:23

## 2021-01-21 RX ADMIN — IPRATROPIUM BROMIDE AND ALBUTEROL SULFATE 3 ML: 2.5; .5 SOLUTION RESPIRATORY (INHALATION) at 06:04

## 2021-01-21 RX ADMIN — OXYCODONE HYDROCHLORIDE AND ACETAMINOPHEN 1 TABLET: 5; 325 TABLET ORAL at 04:53

## 2021-01-21 RX ADMIN — INSULIN LISPRO 6 UNITS: 100 INJECTION, SOLUTION INTRAVENOUS; SUBCUTANEOUS at 12:10

## 2021-01-21 RX ADMIN — LIDOCAINE 2 PATCH: 50 PATCH CUTANEOUS at 08:30

## 2021-01-21 RX ADMIN — ENOXAPARIN SODIUM 30 MG: 30 INJECTION SUBCUTANEOUS at 20:39

## 2021-01-21 RX ADMIN — POTASSIUM CHLORIDE 20 MEQ: 750 CAPSULE, EXTENDED RELEASE ORAL at 08:23

## 2021-01-21 RX ADMIN — BISACODYL 10 MG: 5 TABLET ORAL at 20:39

## 2021-01-21 RX ADMIN — METOPROLOL TARTRATE 25 MG: 25 TABLET, FILM COATED ORAL at 08:25

## 2021-01-21 RX ADMIN — OXYCODONE HYDROCHLORIDE AND ACETAMINOPHEN 1 TABLET: 5; 325 TABLET ORAL at 23:58

## 2021-01-21 RX ADMIN — METOPROLOL TARTRATE 25 MG: 25 TABLET, FILM COATED ORAL at 20:39

## 2021-01-21 RX ADMIN — POLYETHYLENE GLYCOL 3350 17 G: 17 POWDER, FOR SOLUTION ORAL at 08:25

## 2021-01-21 RX ADMIN — AMIODARONE HYDROCHLORIDE 400 MG: 200 TABLET ORAL at 08:23

## 2021-01-21 RX ADMIN — METFORMIN HYDROCHLORIDE 1000 MG: 500 TABLET ORAL at 17:18

## 2021-01-21 RX ADMIN — CLOPIDOGREL 75 MG: 75 TABLET, FILM COATED ORAL at 17:18

## 2021-01-21 NOTE — PLAN OF CARE
Goal Outcome Evaluation:  Plan of Care Reviewed With: patient  Progress: improving   Patient nauseated at this time. CGA to walk 180'. Requested to return to room.

## 2021-01-21 NOTE — PLAN OF CARE
Goal Outcome Evaluation:  Plan of Care Reviewed With: patient  Progress: no change  Outcome Summary: Pt has a poor appetite, 19% avg of 4 meals. Prior to CABG pt's appetite was fairly good. Ordered Boost GC daily with breakfast and lunch. Encourage PO intake, will continue to follow for nutrition needs.

## 2021-01-21 NOTE — PROGRESS NOTES
"CABG-4V, Left endoscopic vein harvest, right open vein harvest    POD 3    Up in the chair eating breakfast. Has heartburn \"think it's from the pop.\" Currently, on room air but required 1 liter nasal cannula overnight. IS ~7424-3899 ml. Rates pain 5/10. Small BM this morning. Weight down 3 pounds. Walking over 200 feet with physical therapy.     Telemetry: sinus 70s      Visit Vitals  /77 (BP Location: Left arm, Patient Position: Lying)   Pulse 82   Temp 97.4 °F (36.3 °C) (Oral)   Resp 16   Ht 175.3 cm (69.02\")   Wt 89.9 kg (198 lb 2 oz)   SpO2 96%   BMI 29.24 kg/m²   RA  Baseline weight 185 pounds     Intake/Output Summary (Last 24 hours) at 1/21/2021 0906  Last data filed at 1/21/2021 0858  Gross per 24 hour   Intake 960 ml   Output 1972 ml   Net -1012 ml     Mediastinal tube output: 92 ml/24 hours  Left Arthur drain: 5 ml/24 hours     Labs:      Chest x-ray: no pneumothorax, bibasilar atelectasis and small pleural effusions    Physical Exam:  General: No apparent distress. In good spirits. Up in the chair.  Cardiovascular: Regular rate and rhythm without murmur, rubs, or gallops.    Pulmonary: Clear to auscultation bilaterally without wheezing, rubs, or rales.  Chest: Sternotomy incision clean, dry, and intact. Sternum stable. No clicks. Mediastinal tubes x 2 and Arthur drain x 1 with dressing clean, dry, and intact.    Abdomen: Soft, distended and non tender.  Extremities: Warm, moves all extremities. Saphenectomy sites clean, dry, and intact. Lower extremity edema.     Neurologic: Grossly intact with no focal deficits.       Impression:  Coronary artery disease   Acute systolic congestive heart failure (CMS/HCC)  NSTEMI (non-ST elevated myocardial infarction) (CMS/HCC)  Acute pulmonary edema (CMS/HCC)  Diabetes mellitus (CMS/HCC)  Hypertension  Hyperlipidemia  Coronary artery disease, multivessel  Aspirin like platelet function defect  Bilateral carotid artery stenosis, asymptomatic  Hypoxia, improving "     Plan:  Continue diuresis  Mediastinal tubes and suzanne drain removed  Continue routine post cardiac surgery regimen  Bowel regimen  Limited echo prior to DC   DW patient and nursing  Anticipate DC home 1-2 days

## 2021-01-21 NOTE — PLAN OF CARE
Goal Outcome Evaluation:  Plan of Care Reviewed With: patient  Progress: improving   Patient stands and ambulates 280' with SBA and 1 standing rest. RA began 91%..dropped to low 80's. Recovered to 91 in approx 1min on RA. Instructed and worked on incentive spirometer.

## 2021-01-21 NOTE — PLAN OF CARE
Goal Outcome Evaluation:  Plan of Care Reviewed With: patient  Progress: improving  Outcome Summary: VSS. Currently on 1LNC will attemtpt to wean off O2. Pain controlled with PRN Percocet. Does well standing for a sitting position. Chest tubes remain with little output. Safety maintained,

## 2021-01-21 NOTE — THERAPY TREATMENT NOTE
Acute Care - Physical Therapy Treatment Note  [facility]     Patient Name: Wesley Eduardo  : 1945  MRN: [id number]  Today's Date: 2021           PT Assessment (last 12 hours)      PT Evaluation and Treatment     Row Name 21 0942          Physical Therapy Time and Intention    Subjective Information  complains of;fatigue;pain  -VIKA     Document Type  therapy note (daily note)  -VIKA     Mode of Treatment  physical therapy  -VIKA     Comment  Worked on incentive spirometer  -VIKA     Row Name 21 0942          General Information    Patient/Family/Caregiver Comments/Observations  wife  -VIKA     Existing Precautions/Restrictions  fall;oxygen therapy device and L/min  -VIKA     Row Name 21 0942          Pain Scale: Numbers Pre/Post-Treatment    Pretreatment Pain Rating  3/10  -VIKA     Posttreatment Pain Rating  3/10  -VIKA     Pain Location - Orientation  incisional  -VIKA     Pain Location  chest  -VIKA     Row Name 21 0942          Transfers    Sit-Stand [place] (Transfers)  contact guard  -VIKA     Stand-Sit [place] (Transfers)  standby assist;verbal cues  -VIKA     Row Name 21 0942          Gait/Stairs (Locomotion)    [place] Level (Gait)  standby assist  -     Distance in Feet (Gait)  280 1 standing  -VIKA     Deviations/Abnormal Patterns (Gait)  stride length decreased  -VIKA     Row Name 21 0942          Motor Skills    Therapeutic Exercise  -- protocol x15  -VIKA     Row Name             Wound 21 08 Left leg Incision    Wound - Properties Group Placement Date: 21  -DT Placement Time: 823  -DT Present on Hospital Admission: N  -DT Side: Left  -DT Location: leg  -DT Primary Wound Type: Incision  -DT    Retired Wound - Properties Group Date first assessed: 21  -DT Time first assessed: 823  -DT Present on Hospital Admission: N  -DT Side: Left  -DT Location: leg  -DT Primary Wound Type: Incision  -DT    Row Name             Wound 21 0940 sternal Incision     Wound - Properties Group Placement Date: 01/18/21  -DT Placement Time: 0940  -DT Present on Hospital Admission: N  -DT Location: sternal  -DT Primary Wound Type: Incision  -DT    Retired Wound - Properties Group Date first assessed: 01/18/21  -DT Time first assessed: 0940  -DT Present on Hospital Admission: N  -DT Location: sternal  -DT Primary Wound Type: Incision  -DT    Row Name             Wound 01/18/21 0953 Right lower leg Incision    Wound - Properties Group Placement Date: 01/18/21  -DT Placement Time: 0953  -DT Present on Hospital Admission: N  -DT Side: Right  -DT Orientation: lower  -DT Location: leg  -DT Primary Wound Type: Incision  -DT    Retired Wound - Properties Group Date first assessed: 01/18/21  -DT Time first assessed: 0953  -DT Present on Hospital Admission: N  -DT Side: Right  -DT Location: leg  -DT Primary Wound Type: Incision  -DT    Row Name 01/21/21 0942          Positioning and Restraints    Pre-Treatment Position  sitting in chair/recliner  -VIKA     Post Treatment Position  chair  -VIKA     In Chair  notified nsg;reclined;call light within reach;with family/caregiver;RUE elevated;LUE elevated  -VIKA       User Key  (r) = Recorded By, (t) = Taken By, (c) = Cosigned By    Initials Name Provider Type    Kirsten Fox PTA Physical Therapy Assistant    Orville Morales, RN Registered Nurse        Physical Therapy Education                 Title: PT OT SLP Therapies (In Progress)     Topic: Physical Therapy (In Progress)     Point: Mobility training (Not Started)     Learner Progress:  Not documented in this visit.          Point: Home exercise program (Done)     Learning Progress Summary           Patient Acceptance, E,TB, NR,VU by VIKA at 1/21/2021 1005    Comment: Incentive spirometer   Significant Other Acceptance, E,TB, NR,VU by IVKA at 1/21/2021 1005    Comment: Incentive spirometer                   Point: Body mechanics (Done)     Learning Progress Summary           Patient  Acceptance, E, VU by CINDI at 1/19/2021 0753    Comment: pt educated on sternal precautions, postural re-ed while ambulating                   Point: Precautions (Done)     Learning Progress Summary           Patient Acceptance, E, VU by CINDI at 1/19/2021 0753    Comment: pt educated on sternal precautions, postural re-ed while ambulating                               User Key     Initials Effective Dates Name Provider Type [facility] 08/02/16 -  Kirsten Blandon PTA Physical Therapy Assistant PT    CINDI 12/23/20 -  Irwin Rosas PT Student PT Student PT              PT Recommendation and Plan             Time Calculation:   PT Charges     Row Name 01/21/21 1008             Time Calculation    Start Time  0942  -VIKA      Stop Time  1007  -VIKA      Time Calculation (min)  25 min  -VIKA         Timed Charges    40493 - Gait Training Minutes   25  -VIKA        User Key  (r) = Recorded By, (t) = Taken By, (c) = Cosigned By    Initials Name Provider Type    VIKA Kirsten Blandon PTA Physical Therapy Assistant        Therapy Charges for Today     Code Description Service Date Service Provider Modifiers Qty    42964971660 HC GAIT TRAINING EA 15 MIN 1/20/2021 Kirsten Blandon, YARA GP 2    [id number] HC GAIT TRAINING EA 15 MIN 1/20/2021 Kirsten Blandon, PTA GP 1    89937031298 HC GAIT TRAINING EA 15 MIN 1/21/2021 Kirsten Blandon, YARA GP 2          PT G-Codes  Outcome Measure Options: AM-PAC 6 Clicks Basic Mobility (PT)  AM-PAC 6 Clicks Score (PT): 13    Kirsten Blandon PTA  1/21/2021

## 2021-01-22 ENCOUNTER — APPOINTMENT (OUTPATIENT)
Dept: CARDIOLOGY | Facility: HOSPITAL | Age: 76
End: 2021-01-22

## 2021-01-22 LAB
ALBUMIN SERPL-MCNC: 3 G/DL (ref 3.5–5.2)
ALBUMIN/GLOB SERPL: 1.2 G/DL
ALP SERPL-CCNC: 81 U/L (ref 39–117)
ALT SERPL W P-5'-P-CCNC: 17 U/L (ref 1–41)
ANION GAP SERPL CALCULATED.3IONS-SCNC: 8 MMOL/L (ref 5–15)
AST SERPL-CCNC: 23 U/L (ref 1–40)
BH CV ECHO MEAS - BSA(HAYCOCK): 2.1 M^2
BH CV ECHO MEAS - BSA: 2.1 M^2
BH CV ECHO MEAS - BZI_BMI: 29.1 KILOGRAMS/M^2
BH CV ECHO MEAS - BZI_METRIC_HEIGHT: 175.3 CM
BH CV ECHO MEAS - BZI_METRIC_WEIGHT: 89.4 KG
BH CV ECHO MEAS - EDV(MOD-SP4): 124 ML
BH CV ECHO MEAS - EF(MOD-SP4): 45.2 %
BH CV ECHO MEAS - ESV(MOD-SP4): 68 ML
BH CV ECHO MEAS - LV DIASTOLIC VOL/BSA (35-75): 60.4 ML/M^2
BH CV ECHO MEAS - LV SYSTOLIC VOL/BSA (12-30): 33.1 ML/M^2
BH CV ECHO MEAS - LVLD AP4: 8.1 CM
BH CV ECHO MEAS - LVLS AP4: 7.6 CM
BH CV ECHO MEAS - RAP SYSTOLE: 5 MMHG
BH CV ECHO MEAS - RVSP: 23.1 MMHG
BH CV ECHO MEAS - SI(MOD-SP4): 27.3 ML/M^2
BH CV ECHO MEAS - SV(MOD-SP4): 56 ML
BH CV ECHO MEAS - TR MAX VEL: 213 CM/SEC
BILIRUB SERPL-MCNC: 0.5 MG/DL (ref 0–1.2)
BUN SERPL-MCNC: 24 MG/DL (ref 8–23)
BUN/CREAT SERPL: 32.9 (ref 7–25)
CALCIUM SPEC-SCNC: 8.8 MG/DL (ref 8.6–10.5)
CHLORIDE SERPL-SCNC: 98 MMOL/L (ref 98–107)
CO2 SERPL-SCNC: 26 MMOL/L (ref 22–29)
CREAT SERPL-MCNC: 0.73 MG/DL (ref 0.76–1.27)
DEPRECATED RDW RBC AUTO: 39.8 FL (ref 37–54)
ERYTHROCYTE [DISTWIDTH] IN BLOOD BY AUTOMATED COUNT: 13 % (ref 12.3–15.4)
GFR SERPL CREATININE-BSD FRML MDRD: 105 ML/MIN/1.73
GLOBULIN UR ELPH-MCNC: 2.6 GM/DL
GLUCOSE BLDC GLUCOMTR-MCNC: 143 MG/DL (ref 70–130)
GLUCOSE BLDC GLUCOMTR-MCNC: 205 MG/DL (ref 70–130)
GLUCOSE BLDC GLUCOMTR-MCNC: 313 MG/DL (ref 70–130)
GLUCOSE SERPL-MCNC: 213 MG/DL (ref 65–99)
HCT VFR BLD AUTO: 33.6 % (ref 37.5–51)
HGB BLD-MCNC: 11.2 G/DL (ref 13–17.7)
MAXIMAL PREDICTED HEART RATE: 147 BPM
MCH RBC QN AUTO: 28.2 PG (ref 26.6–33)
MCHC RBC AUTO-ENTMCNC: 33.3 G/DL (ref 31.5–35.7)
MCV RBC AUTO: 84.6 FL (ref 79–97)
PLATELET # BLD AUTO: 312 10*3/MM3 (ref 140–450)
PMV BLD AUTO: 10.5 FL (ref 6–12)
POTASSIUM SERPL-SCNC: 3.9 MMOL/L (ref 3.5–5.2)
PROT SERPL-MCNC: 5.6 G/DL (ref 6–8.5)
QT INTERVAL: 400 MS
QTC INTERVAL: 458 MS
RBC # BLD AUTO: 3.97 10*6/MM3 (ref 4.14–5.8)
SODIUM SERPL-SCNC: 132 MMOL/L (ref 136–145)
STRESS TARGET HR: 125 BPM
WBC # BLD AUTO: 11.54 10*3/MM3 (ref 3.4–10.8)

## 2021-01-22 PROCEDURE — 25010000002 PERFLUTREN 6.52 MG/ML SUSPENSION: Performed by: THORACIC SURGERY (CARDIOTHORACIC VASCULAR SURGERY)

## 2021-01-22 PROCEDURE — 85027 COMPLETE CBC AUTOMATED: CPT | Performed by: NURSE PRACTITIONER

## 2021-01-22 PROCEDURE — 97116 GAIT TRAINING THERAPY: CPT

## 2021-01-22 PROCEDURE — 25010000002 ENOXAPARIN PER 10 MG: Performed by: NURSE PRACTITIONER

## 2021-01-22 PROCEDURE — 93308 TTE F-UP OR LMTD: CPT | Performed by: INTERNAL MEDICINE

## 2021-01-22 PROCEDURE — 82962 GLUCOSE BLOOD TEST: CPT

## 2021-01-22 PROCEDURE — 99024 POSTOP FOLLOW-UP VISIT: CPT | Performed by: NURSE PRACTITIONER

## 2021-01-22 PROCEDURE — 25010000002 FUROSEMIDE PER 20 MG: Performed by: THORACIC SURGERY (CARDIOTHORACIC VASCULAR SURGERY)

## 2021-01-22 PROCEDURE — 93308 TTE F-UP OR LMTD: CPT

## 2021-01-22 PROCEDURE — 80053 COMPREHEN METABOLIC PANEL: CPT | Performed by: NURSE PRACTITIONER

## 2021-01-22 PROCEDURE — 63710000001 INSULIN LISPRO (HUMAN) PER 5 UNITS: Performed by: NURSE PRACTITIONER

## 2021-01-22 RX ORDER — ATORVASTATIN CALCIUM 40 MG/1
40 TABLET, FILM COATED ORAL NIGHTLY
Status: DISCONTINUED | OUTPATIENT
Start: 2021-01-23 | End: 2021-01-23 | Stop reason: HOSPADM

## 2021-01-22 RX ORDER — RAMIPRIL 5 MG/1
5 CAPSULE ORAL
Status: DISCONTINUED | OUTPATIENT
Start: 2021-01-23 | End: 2021-01-23 | Stop reason: HOSPADM

## 2021-01-22 RX ADMIN — LIDOCAINE 2 PATCH: 50 PATCH CUTANEOUS at 08:58

## 2021-01-22 RX ADMIN — ENOXAPARIN SODIUM 30 MG: 30 INJECTION SUBCUTANEOUS at 20:14

## 2021-01-22 RX ADMIN — METOPROLOL TARTRATE 25 MG: 25 TABLET, FILM COATED ORAL at 08:58

## 2021-01-22 RX ADMIN — METOPROLOL TARTRATE 25 MG: 25 TABLET, FILM COATED ORAL at 20:14

## 2021-01-22 RX ADMIN — PERFLUTREN 8.48 MG: 6.52 INJECTION, SUSPENSION INTRAVENOUS at 13:52

## 2021-01-22 RX ADMIN — RAMIPRIL 2.5 MG: 1.25 CAPSULE ORAL at 08:58

## 2021-01-22 RX ADMIN — FAMOTIDINE 20 MG: 20 TABLET, FILM COATED ORAL at 08:58

## 2021-01-22 RX ADMIN — INSULIN LISPRO 7 UNITS: 100 INJECTION, SOLUTION INTRAVENOUS; SUBCUTANEOUS at 12:03

## 2021-01-22 RX ADMIN — POTASSIUM CHLORIDE 20 MEQ: 750 CAPSULE, EXTENDED RELEASE ORAL at 08:58

## 2021-01-22 RX ADMIN — TAMSULOSIN HYDROCHLORIDE 0.4 MG: 0.4 CAPSULE ORAL at 20:13

## 2021-01-22 RX ADMIN — ATORVASTATIN CALCIUM 20 MG: 10 TABLET, FILM COATED ORAL at 20:14

## 2021-01-22 RX ADMIN — ASPIRIN 81 MG: 81 TABLET, COATED ORAL at 08:58

## 2021-01-22 RX ADMIN — INSULIN LISPRO 4 UNITS: 100 INJECTION, SOLUTION INTRAVENOUS; SUBCUTANEOUS at 08:59

## 2021-01-22 RX ADMIN — FUROSEMIDE 20 MG: 10 INJECTION, SOLUTION INTRAVENOUS at 12:03

## 2021-01-22 RX ADMIN — METFORMIN HYDROCHLORIDE 1000 MG: 500 TABLET ORAL at 08:58

## 2021-01-22 RX ADMIN — FUROSEMIDE 20 MG: 10 INJECTION, SOLUTION INTRAVENOUS at 08:58

## 2021-01-22 RX ADMIN — ENOXAPARIN SODIUM 30 MG: 30 INJECTION SUBCUTANEOUS at 08:58

## 2021-01-22 RX ADMIN — POTASSIUM CHLORIDE 20 MEQ: 750 CAPSULE, EXTENDED RELEASE ORAL at 17:21

## 2021-01-22 RX ADMIN — FUROSEMIDE 20 MG: 10 INJECTION, SOLUTION INTRAVENOUS at 17:21

## 2021-01-22 RX ADMIN — AMIODARONE HYDROCHLORIDE 400 MG: 200 TABLET ORAL at 08:58

## 2021-01-22 RX ADMIN — OXYCODONE HYDROCHLORIDE AND ACETAMINOPHEN 1 TABLET: 5; 325 TABLET ORAL at 20:14

## 2021-01-22 RX ADMIN — POTASSIUM CHLORIDE 20 MEQ: 750 CAPSULE, EXTENDED RELEASE ORAL at 12:03

## 2021-01-22 RX ADMIN — METFORMIN HYDROCHLORIDE 1000 MG: 500 TABLET ORAL at 17:21

## 2021-01-22 RX ADMIN — CLOPIDOGREL 75 MG: 75 TABLET, FILM COATED ORAL at 17:21

## 2021-01-22 RX ADMIN — OXYCODONE HYDROCHLORIDE AND ACETAMINOPHEN 1 TABLET: 5; 325 TABLET ORAL at 17:20

## 2021-01-22 NOTE — THERAPY TREATMENT NOTE
Acute Care - Physical Therapy Treatment Note  [facility]     Patient Name: Wesley Eduardo  : 1945  MRN: [id number]  Today's Date: 2021           PT Assessment (last 12 hours)      PT Evaluation and Treatment     Row Name 21 1445 21 09       Physical Therapy Time and Intention    Subjective Information  complains of;pain  -VIKA  complains of;nausea/vomiting  -VIKA    Document Type  therapy note (daily note)  -VIKA  therapy note (daily note)  -VIKA    Mode of Treatment  physical therapy  -VIKA  physical therapy  -VIKA    Row Name 21 1445 21 09       General Information    Existing Precautions/Restrictions  fall;sternal  -VIKA  fall;sternal  -VIKA    Row Name 21 1445 21 09       Pain Scale: Word Pre/Post-Treatment    Pain: Word Scale, Pretreatment  --  2 - mild pain  -VIKA    Posttreatment Pain Rating  2 - mild pain  -VIKA  2 - mild pain  -VIKA    Pain Location - Orientation  --  incisional  -VIKA    Pain Location  --  chest  -VIKA    Row Name 21 1445 21 09       Transfers    Sit-Stand [place] (Transfers)  contact guard  -VIKA  verbal cues;contact guard  -VIKA    Stand-Sit [place] (Transfers)  standby assist  -VIKA  standby assist  -VIKA    [place] Level (Toilet Transfer)  contact guard  -VIKA  --    Row Name 21          Toilet Transfer    Type (Toilet Transfer)  sit-stand;stand-sit  -VIKA     Row Name 21 1445 21 09       Gait/Stairs (Locomotion)    [place] Level (Gait)  contact guard  -VIKA  contact guard  -VIKA    Distance in Feet (Gait)  260 1 standing rest  -VIKA  250 2 standing rests  -VIKA    Deviations/Abnormal Patterns (Gait)  base of support, narrow;stepan decreased  -VIKA  base of support, narrow;stepan decreased  -VIKA    Row Name 21 1445 21 0925       Motor Skills    Therapeutic Exercise  -- protocol x15  -VIKA  -- protocol x15  -VIKA    Row Name             Wound 21 0823 Left leg Incision    Wound - Properties Group Placement  Date: 01/18/21  -DT Placement Time: 0823  -DT Present on Hospital Admission: N  -DT Side: Left  -DT Location: leg  -DT Primary Wound Type: Incision  -DT    Retired Wound - Properties Group Date first assessed: 01/18/21  -DT Time first assessed: 0823  -DT Present on Hospital Admission: N  -DT Side: Left  -DT Location: leg  -DT Primary Wound Type: Incision  -DT    Row Name             Wound 01/18/21 0940 sternal Incision    Wound - Properties Group Placement Date: 01/18/21  -DT Placement Time: 0940  -DT Present on Hospital Admission: N  -DT Location: sternal  -DT Primary Wound Type: Incision  -DT    Retired Wound - Properties Group Date first assessed: 01/18/21  -DT Time first assessed: 0940  -DT Present on Hospital Admission: N  -DT Location: sternal  -DT Primary Wound Type: Incision  -DT    Row Name             Wound 01/18/21 0953 Right lower leg Incision    Wound - Properties Group Placement Date: 01/18/21  -DT Placement Time: 0953  -DT Present on Hospital Admission: N  -DT Side: Right  -DT Orientation: lower  -DT Location: leg  -DT Primary Wound Type: Incision  -DT    Retired Wound - Properties Group Date first assessed: 01/18/21  -DT Time first assessed: 0953  -DT Present on Hospital Admission: N  -DT Side: Right  -DT Location: leg  -DT Primary Wound Type: Incision  -DT    Row Name 01/22/21 1445          Vital Signs    Post SpO2 (%)  94  -VIKA     O2 Delivery Post Treatment  room air  -     Row Name 01/22/21 1445 01/22/21 0925       Positioning and Restraints    Pre-Treatment Position  sitting in chair/recliner  -VIKA  sitting in chair/recliner  -VIKA    Post Treatment Position  chair  -VIKA  chair  -VIKA    In Chair  reclined;call light within reach;encouraged to call for assist  -VIKA  reclined;call light within reach;encouraged to call for assist  -VIKA      User Key  (r) = Recorded By, (t) = Taken By, (c) = Cosigned By    Initials Name Provider Type    Kirsten Fox PTA Physical Therapy Assistant    ANA M Ma  Orville BARRETT RN Registered Nurse        Physical Therapy Education                 Title: PT OT SLP Therapies (Done)     Topic: Physical Therapy (Done)     Point: Mobility training (Done)     Learning Progress Summary           Patient Acceptance, E, DU by VIKA at 1/22/2021 1134    Comment: Improve breathing technique                   Point: Home exercise program (Done)     Learning Progress Summary           Patient Acceptance, E,TB, NR,VU by VIKA at 1/21/2021 1005    Comment: Incentive spirometer   Significant Other Acceptance, E,TB, NR,VU by VIKA at 1/21/2021 1005    Comment: Incentive spirometer                   Point: Body mechanics (Done)     Learning Progress Summary           Patient Acceptance, E, VU by CINDI at 1/19/2021 0753    Comment: pt educated on sternal precautions, postural re-ed while ambulating                   Point: Precautions (Done)     Learning Progress Summary           Patient Acceptance, E, VU by CINDI at 1/19/2021 0753    Comment: pt educated on sternal precautions, postural re-ed while ambulating                               User Key     Initials Effective Dates Name Provider Type Discipline     08/02/16 -  Kirsten Blandon PTA Physical Therapy Assistant PT     12/23/20 -  Irwin Rosas PT Student PT Student PT              PT Recommendation and Plan             Time Calculation:   PT Charges     Row Name 01/22/21 1137             Time Calculation    Start Time  0925  -      Stop Time  0948  -      Time Calculation (min)  23 min  -         Timed Charges    72361 - PT Therapeutic Exercise Minutes  23  -VIKA        User Key  (r) = Recorded By, (t) = Taken By, (c) = Cosigned By    Initials Name Provider Type     Kirsten Blandon PTA Physical Therapy Assistant        Therapy Charges for Today     Code Description Service Date Service Provider Modifiers Qty    07177154921 HC GAIT TRAINING EA 15 MIN 1/21/2021 Kirsten Blandon PTA GP 2    26710300734 HC GAIT TRAINING EA 15 MIN 1/21/2021  Kirsten Blandon, PTA GP 1    [id number] HC GAIT TRAINING EA 15 MIN 1/22/2021 Kirsten Blandon, PTA GP 2    [id number] HC GAIT TRAINING EA 15 MIN 1/22/2021 Kirsten Blandon, PTA GP 1          PT G-Codes  Outcome Measure Options: AM-PAC 6 Clicks Basic Mobility (PT)  AM-PAC 6 Clicks Score (PT): 13    Kirsten Blandon, PTA  1/22/2021

## 2021-01-22 NOTE — PLAN OF CARE
Goal Outcome Evaluation:  Plan of Care Reviewed With: patient  Progress: improving  Outcome Summary: correction: patient pulling 1500 on the IS.  cont. to monitor.  call for concerns.

## 2021-01-22 NOTE — PROGRESS NOTES
"CABG-4V, Left endoscopic vein harvest, right open vein harvest    POD 4    Up in the chair wrapped up in blankets. Says he is cold and queasy. Denies pain. Multiple BM but abdomen is still distended. Required supplemental O2 overnight for a short time. On room air now. IS ~1500 ml. Due to walk today. Weight down 1 pound.     Telemetry: sinus 69-81 bpm    Visit Vitals  /54 (BP Location: Left arm, Patient Position: Lying)   Pulse 80   Temp 97.6 °F (36.4 °C) (Oral)   Resp 18   Ht 175.3 cm (69.02\")   Wt 89.4 kg (197 lb)   SpO2 93%   BMI 29.08 kg/m²   RA  Baseline weight 185 pounds     Intake/Output Summary (Last 24 hours) at 1/22/2021 1044  Last data filed at 1/22/2021 1022  Gross per 24 hour   Intake 360 ml   Output 1350 ml   Net -990 ml     Labs:      Physical Exam:  General: No apparent distress. In good spirits. Up in the chair.  Cardiovascular: Regular rate and rhythm without murmur, rubs, or gallops.    Pulmonary: Clear to auscultation bilaterally without wheezing, rubs, or rales.  Chest: Sternotomy incision clean, dry, and intact. Sternum stable. No clicks.  Abdomen: Soft, distended and non tender.  Extremities: Warm, moves all extremities. Saphenectomy sites clean, dry, and intact. Lower extremity edema improving.   Neurologic: Grossly intact with no focal deficits.       Impression:  Coronary artery disease   Acute systolic congestive heart failure (CMS/HCC)  NSTEMI (non-ST elevated myocardial infarction) (CMS/HCC)  Acute pulmonary edema (CMS/HCC)  Diabetes mellitus (CMS/HCC)  Hypertension  Hyperlipidemia  Coronary artery disease, multivessel  Aspirin like platelet function defect  Bilateral carotid artery stenosis, asymptomatic  Hypoxia, improving   Hyponatremia    Plan:  No free water, fluid restriction of 1500 ml/24 hours  Continue routine post cardiac surgery regimen  Continue bowel regimen  Limited echo today   DW patient and nursing  Anticipate DC home soon   "

## 2021-01-22 NOTE — PLAN OF CARE
Goal Outcome Evaluation:  Plan of Care Reviewed With: patient  Progress: improving  Outcome Summary: Patient for labs this am.  PRN pain meds given with good results.  sats wnl on room air.  Trace edema to ble.  patient had another bm last night and is feeling better.  Patient is pulling 100 on the ID.  Sinus per tele 70's - 90's.  PM accu check was 208.  Full code.   Up with assist x1.

## 2021-01-22 NOTE — PLAN OF CARE
Goal Outcome Evaluation:  Plan of Care Reviewed With: patient  Progress: improving   Patient c/o nausea. Stands and ambulates 260' with 2 standing rests. Patient has a narrow base of support. Worked on widening stance to improve gait. RA SAT 94-97%. Will benefit from safety education and increased ambulation.

## 2021-01-22 NOTE — DISCHARGE SUMMARY
[facility] Cardiothoracic Surgery  DISCHARGE SUMMARY        Date of Admission: 1/7/2021  Date of Discharge:  1/23/2021  Primary Care Physician: Alfredito Land MD    Discharge Diagnoses:  Active Hospital Problems    Diagnosis   • **Acute systolic congestive heart failure (CMS/HCC)   • Acute respiratory failure with hypoxia (CMS/HCC)   • Aspirin-like platelet function defect (CMS/HCC)   • Stenosis of left carotid artery   • Hyponatremia   • 3-vessel coronary artery disease   • NSTEMI (non-ST elevated myocardial infarction) (CMS/HCC)   • Type 2 diabetes mellitus, without long-term current use of insulin (CMS/HCC)   • Hypertension   • Hyperlipidemia       Procedures Performed: Coronary artery bypass grafting-4 vessel (left internal mammary artery/left anterior descending, reverse saphenous vein graft/first obtuse marginal, reverse saphenous vein graft/ramus intermedius, and reverse saphenous vein graft/Posterior descending artery), Left endoscopic vein harvest, Right open vein harvest performed on 1/18/2021 by Dr. Wood.      HPI:  Mr. Wesley Eduardo is a 75 y.o. male is a 75 year old male with pertinent past medical history of type 2 diabetes mellitus with reported blood glucoses  at home, hypertension, hyperlipidemia, asymptomatic carotid artery stenosis followed by [facility] vascular surgery, and former tobacco use (smoked 1 ppd x 10 years, quit 40 years ago). He reports chest tightness and dyspnea with exertion that started about a year ago. He had relief upon rest and then was able to get up and complete activities. 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 On 1/8/2021, he presented to [facility] emergency department after worsening shortness of breath, chest tightness and pressure, and fatigue. He denied nausea or diaphoresis. He was found to be hypoxic and told he had pneumonia with WBC 24,000. He was given antibiotics. He was ruled in a NSTEMI and transferred to  [facility] emergency department. He required high flow nasal cannula to maintain O2 saturations over 92%. CTA of the chest showed no evidence of pulmonary embolus but with bilateral small pleural effusions with pulmonary edema. He was admitted to the hospitalist service and cardiology was consulted. He was diuresed. His weight is down 9 pounds from admission weight. Transthoracic echocardiogram showed left ventricular function decreased, EF 36-40%, with no significant valvular dysfunction. Cardiac catheterization was performed today and showed severe three vessel coronary artery disease. Cardiothoracic surgery was consulted. Last dose of plavix was 1/11/2021. Primary care provider is Dr. Land. Hemoglobin A1c 7.1%.     Hospital Course: Pre operative testing was completed and he was deemed an acceptable risk candidate. Time was permitted to repopulate an effective platelet population. He was taken to the operative suite on 1/18/2021. Please see a separate op note by Dr. Wood. Following surgery, he was transferred to the ICU in stable guarded condition. He was extubated overnight to a nasal cannula and demonstrated a neurologically intact exam. He was hemodynamically stable. He was transferred to  for continued recovery. The remaining stay of his hospitalization was remarkable for diuresis, encouraging pulmonary toilet and ambulation, and weaning supplemental oxygen. He had no bouts of atrial fibrillation and remained in normal sinus rhythm for the duration of hospitalization. Supplemental oxygen was weaned off and he was saturating appropriately on room air. Limited echocardiogram post operatively demonstrated EF 36-40%. He is walking 300 feet with physical therapy. He meets criteria for discharge home on post op day 5. He is agreeable to discharge home with his spouse.     Condition on Discharge:  Neurologically intact and has good pain control.  He is eating well and has demonstrable good bowel  function.  The sternum is stable without clicks and the saphenectomy incisions are healing nicely.  The heart is in  normal sinus rhythm.  He has met all physical therapy criteria and verbalizes understanding of sternotomy precautions.   He verbalizes understanding of a separately handed out cardiac surgery handout.       Discharge Disposition:  Home or Self Care [1]    Discharge Medications:     Discharge Medications      New Medications      Instructions Start Date   furosemide 40 MG tablet  Commonly known as: Lasix   20 mg, Oral, 2 Times Daily      metoprolol tartrate 25 MG tablet  Commonly known as: LOPRESSOR   25 mg, Oral, 2 Times Daily         Changes to Medications      Instructions Start Date   ramipril 5 MG capsule  Commonly known as: ALTACE  What changed:   · medication strength  · how much to take  · when to take this   5 mg, Oral, Every 24 Hours Scheduled         Continue These Medications      Instructions Start Date   amitriptyline 100 MG tablet  Commonly known as: ELAVIL   100 mg, Oral, Nightly      amLODIPine 5 MG tablet  Commonly known as: NORVASC   5 mg, Oral, Daily      aspirin 81 MG EC tablet   81 mg, Oral, Daily      clopidogrel 75 MG tablet  Commonly known as: PLAVIX   75 mg, Oral, Daily      gabapentin 600 MG tablet  Commonly known as: NEURONTIN   600 mg, Oral, 4 Times Daily      indapamide 1.25 MG tablet  Commonly known as: LOZOL   1.25 mg, Oral, Every Morning      metFORMIN 1000 MG tablet  Commonly known as: GLUCOPHAGE   1,000 mg, Oral, 2 Times Daily With Meals      multivitamin with minerals tablet tablet   1 tablet, Oral, Daily      raNITIdine 300 MG tablet  Commonly known as: ZANTAC   300 mg, Oral, 2 Times Daily      simvastatin 40 MG tablet  Commonly known as: ZOCOR   40 mg, Oral, Nightly      tamsulosin 0.4 MG capsule 24 hr capsule  Commonly known as: FLOMAX   1 capsule, Oral, Daily      traMADol-acetaminophen 37.5-325 MG per tablet  Commonly known as: ULTRACET   1 tablet, Oral, 2 Times  Daily PRN             Discharge Diet: No concentrated sweets diet with an effort to maintain acceptable glycemic control.      Discharge Care Plan / Instructions: Please see the separately handed out cardiac surgery handout.    Activity at Discharge:   No heavy lifting greater than 5 pounds or a gallon of milk while maintaining sternal precautions.  Wesley Eduardo has been instructed on an exercise  regiment as detailed in a handed out cardiac surgery handout.    Tobacco:  The patient does not use tobacco products and therefore does not need tobacco cessation education.    BMI: Patient's Body mass index is 28.31 kg/m². BMI is above normal parameters. Recommendations include: educational material and referral to primary care.      Follow-up Appointments: Wesley Eduardo  is requested to see Alfredito Ladn MD within 1-2 weeks from time of discharge, to see Viky ORTIZ in 1 week, to follow-up with Dr. Wood in 4 weeks,  and to follow-up with Dr. Hong of the cardiology service in 6 weeks.

## 2021-01-23 VITALS
OXYGEN SATURATION: 95 % | SYSTOLIC BLOOD PRESSURE: 133 MMHG | WEIGHT: 191.8 LBS | DIASTOLIC BLOOD PRESSURE: 58 MMHG | HEART RATE: 77 BPM | TEMPERATURE: 98.1 F | HEIGHT: 69 IN | BODY MASS INDEX: 28.41 KG/M2 | RESPIRATION RATE: 18 BRPM

## 2021-01-23 LAB
ANION GAP SERPL CALCULATED.3IONS-SCNC: 9 MMOL/L (ref 5–15)
BUN SERPL-MCNC: 26 MG/DL (ref 8–23)
BUN/CREAT SERPL: 32.5 (ref 7–25)
CALCIUM SPEC-SCNC: 8.7 MG/DL (ref 8.6–10.5)
CHLORIDE SERPL-SCNC: 96 MMOL/L (ref 98–107)
CO2 SERPL-SCNC: 26 MMOL/L (ref 22–29)
CREAT SERPL-MCNC: 0.8 MG/DL (ref 0.76–1.27)
GFR SERPL CREATININE-BSD FRML MDRD: 95 ML/MIN/1.73
GLUCOSE BLDC GLUCOMTR-MCNC: 184 MG/DL (ref 70–130)
GLUCOSE BLDC GLUCOMTR-MCNC: 227 MG/DL (ref 70–130)
GLUCOSE SERPL-MCNC: 206 MG/DL (ref 65–99)
POTASSIUM SERPL-SCNC: 4.2 MMOL/L (ref 3.5–5.2)
QT INTERVAL: 452 MS
QTC INTERVAL: 518 MS
SODIUM SERPL-SCNC: 131 MMOL/L (ref 136–145)

## 2021-01-23 PROCEDURE — 99024 POSTOP FOLLOW-UP VISIT: CPT | Performed by: SURGERY

## 2021-01-23 PROCEDURE — 80048 BASIC METABOLIC PNL TOTAL CA: CPT | Performed by: NURSE PRACTITIONER

## 2021-01-23 PROCEDURE — 97116 GAIT TRAINING THERAPY: CPT

## 2021-01-23 PROCEDURE — 63710000001 INSULIN LISPRO (HUMAN) PER 5 UNITS: Performed by: NURSE PRACTITIONER

## 2021-01-23 PROCEDURE — 82962 GLUCOSE BLOOD TEST: CPT

## 2021-01-23 PROCEDURE — 25010000002 ENOXAPARIN PER 10 MG: Performed by: NURSE PRACTITIONER

## 2021-01-23 RX ORDER — FUROSEMIDE 40 MG/1
20 TABLET ORAL 2 TIMES DAILY
Qty: 6 TABLET | Refills: 0 | Status: SHIPPED | OUTPATIENT
Start: 2021-01-23 | End: 2021-03-08

## 2021-01-23 RX ORDER — RAMIPRIL 5 MG/1
5 CAPSULE ORAL
Qty: 30 CAPSULE | Refills: 3 | Status: SHIPPED | OUTPATIENT
Start: 2021-01-24 | End: 2021-01-23

## 2021-01-23 RX ORDER — RAMIPRIL 5 MG/1
5 CAPSULE ORAL
Qty: 30 CAPSULE | Refills: 3 | Status: SHIPPED | OUTPATIENT
Start: 2021-01-24 | End: 2021-03-08 | Stop reason: SDUPTHER

## 2021-01-23 RX ORDER — FUROSEMIDE 40 MG/1
20 TABLET ORAL 2 TIMES DAILY
Qty: 6 TABLET | Refills: 0 | Status: SHIPPED | OUTPATIENT
Start: 2021-01-23 | End: 2021-01-23 | Stop reason: SDUPTHER

## 2021-01-23 RX ADMIN — FAMOTIDINE 20 MG: 20 TABLET, FILM COATED ORAL at 08:28

## 2021-01-23 RX ADMIN — RAMIPRIL 5 MG: 5 CAPSULE ORAL at 08:27

## 2021-01-23 RX ADMIN — INSULIN LISPRO 2 UNITS: 100 INJECTION, SOLUTION INTRAVENOUS; SUBCUTANEOUS at 08:27

## 2021-01-23 RX ADMIN — METOPROLOL TARTRATE 25 MG: 25 TABLET, FILM COATED ORAL at 08:28

## 2021-01-23 RX ADMIN — ASPIRIN 81 MG: 81 TABLET, COATED ORAL at 08:28

## 2021-01-23 RX ADMIN — METFORMIN HYDROCHLORIDE 1000 MG: 500 TABLET ORAL at 08:27

## 2021-01-23 RX ADMIN — POTASSIUM CHLORIDE 20 MEQ: 750 CAPSULE, EXTENDED RELEASE ORAL at 08:28

## 2021-01-23 RX ADMIN — LIDOCAINE 2 PATCH: 50 PATCH CUTANEOUS at 08:32

## 2021-01-23 RX ADMIN — BISACODYL 10 MG: 5 TABLET ORAL at 08:28

## 2021-01-23 RX ADMIN — ENOXAPARIN SODIUM 30 MG: 30 INJECTION SUBCUTANEOUS at 08:27

## 2021-01-23 NOTE — PLAN OF CARE
Goal Outcome Evaluation:  Plan of Care Reviewed With: patient  Progress: improving  Outcome Summary: Pt. agreeable to therapy, but anxious to return home. Pt. was SBA for transfers and gait. He walked 300' with one standing rest. Pt's O2 sat was 97% on room air while walking.

## 2021-01-23 NOTE — PLAN OF CARE
Problem: Adult Inpatient Plan of Care  Goal: Plan of Care Review  Outcome: Ongoing, Progressing  Flowsheets (Taken 1/23/2021 0624)  Plan of Care Reviewed With: patient  Outcome Summary: Patient pulling 1500 on the IS.  Patient is on a 1500 Fluid Restristion, no free water.  Sodium was 132.  for repeat labs this am.  Patient on room air.  Lidoderm patches.  percocet given at bedtime.  Patient has slept well.  V wires t/i.  Cont. to monitor.  S 69-85  call for concerns.   Goal Outcome Evaluation:  Plan of Care Reviewed With: patient  Progress: improving  Outcome Summary: Patient pulling 1500 on the IS.  Patient is on a 1500 Fluid Restristion, no free water.  Sodium was 132.  for repeat labs this am.  Patient on room air.  Lidoderm patches.  percocet given at bedtime.  Patient has slept well.  V wires t/i.  Cont. to monitor.  S 69-85  call for concerns.

## 2021-01-23 NOTE — THERAPY DISCHARGE NOTE
Acute Care - Physical Therapy Discharge Summary  [facility]       Patient Name: Wesley Eduardo  : 1945  MRN: [id number]    Today's Date: 2021                 Admit Date: 2021      PT Recommendation and Plan    Visit Dx:    ICD-10-CM ICD-9-CM   1. NSTEMI (non-ST elevated myocardial infarction) (CMS/[facility])  I21.4 410.70   2. Acute congestive heart failure, unspecified heart failure type (CMS/[facility])  I50.9 428.0   3. 3-vessel coronary artery disease  I25.10 414.00   4. Impaired mobility  Z74.09 799.89           PT Charges     Row Name 21 0947             Time Calculation    Start Time  0935  -      Stop Time  0958  -      Time Calculation (min)  23 min  -      PT Received On  21  -         Time Calculation- PT    Total Timed Code Minutes- PT  23 minute(s)  -         Timed Charges    90289 - Gait Training Minutes   23  -MF        User Key  (r) = Recorded By, (t) = Taken By, (c) = Cosigned By    Initials Name Provider Type    Bethany Newberry PTA Physical Therapy Assistant          Rehab Goal Summary     Row Name 21 1546             Bed Mobility Goal 1 (PT)    Activity/Assistive Device (Bed Mobility Goal 1, PT)  sit to supine;supine to sit  -KJ      [place] Level/Cues Needed (Bed Mobility Goal 1, PT)  contact guard assist  -KJ      Time Frame (Bed Mobility Goal 1, PT)  long term goal (LTG);10 days  -KJ      Progress/Outcomes (Bed Mobility Goal 1, PT)  goal met  -KJ         Transfer Goal 1 (PT)    Activity/Assistive Device (Transfer Goal 1, PT)  sit-to-stand/stand-to-sit;bed-to-chair/chair-to-bed  -KJ      [place] Level/Cues Needed (Transfer Goal 1, PT)  contact guard assist  -KJ      Time Frame (Transfer Goal 1, PT)  long term goal (LTG);10 days  -KJ      Progress/Outcome (Transfer Goal 1, PT)  goal met  -KJ         Gait Training Goal 1 (PT)    Activity/Assistive Device (Gait Training Goal 1, PT)  gait (walking locomotion);decrease fall risk;improve balance and  speed;increase endurance/gait distance;turning, left;turning, right  -KJ      [place] Level (Gait Training Goal 1, PT)  supervision required  -KJ      Distance (Gait Training Goal 1, PT)  200  -KJ      Time Frame (Gait Training Goal 1, PT)  long term goal (LTG);10 days  -KJ      Progress/Outcome (Gait Training Goal 1, PT)  goal met  -KJ         Stairs Goal 1 (PT)    Activity/Assistive Device (Stairs Goal 1, PT)  stairs, all skills;decrease fall risk;improve balance and speed  -KJ      [place] Level/Cues Needed (Stairs Goal 1, PT)  supervision required  -KJ      Number of Stairs (Stairs Goal 1, PT)  3-5  -KJ      Time Frame (Stairs Goal 1, PT)  long term goal (LTG);10 days  -KJ      Progress/Outcome (Stairs Goal 1, PT)  goal met  -KJ         Patient Education Goal (PT)    Activity (Patient Education Goal, PT)  sternal precautions  -KJ      [place]/Cues/Accuracy (Memory Goal 2, PT)  demonstrates adequately  -KJ      Time Frame (Patient Education Goal, PT)  long term goal (LTG);10 days  -KJ      Progress/Outcome (Patient Education Goal, PT)  goal met  -KJ        User Key  (r) = Recorded By, (t) = Taken By, (c) = Cosigned By    Initials Name Provider Type Discipline    Viky Pierce PTA Physical Therapy Assistant PT              PT Discharge Summary  Anticipated Discharge Disposition (PT): home  Reason for Discharge: Discharge from facility  Outcomes Achieved: Refer to plan of care for updates on goals achieved  Discharge Destination: Home      Viky Martinez PTA   1/23/2021

## 2021-01-23 NOTE — PROGRESS NOTES
[facility] Cardiothoracic Surgery  PROGRESS NOTE     Subjective:  Did well overnight, no problems.  Bowel movement x2 weight is down and closer to baseline.    Objective:      Intake/Output Summary (Last 24 hours) at 1/23/2021 1204  Last data filed at 1/23/2021 0929  Gross per 24 hour   Intake 740 ml   Output 1125 ml   Net -385 ml       Wt preop: 85 kg  Wt current: 87 kg      01/23/21  0424   Weight: 87 kg (191 lb 12.8 oz)         PE:  Vitals:    01/23/21 1148   BP: 133/58   Pulse: 77   Resp: 18   Temp: 98.1 °F (36.7 °C)   SpO2: 95%     GENERAL: NAD, resting comfortably, normal color  CARDIOVASCULAR: regular, regular rate, sinus, sternum is stable incision clean dry and intact without erythema  PULMONARY: Normal bilateral breath sounds, no labored breathing  ABDOMEN: soft, nt/nd  EXTREMITIES: mild peripheral edema, normal pulses, normal ROM, incisions clean dry intact without erythema        Lab Results (last 72 hours)     Procedure Component Value Units Date/Time    POC Glucose Once [[id number]]  (Abnormal) Collected: 01/23/21 1152    Specimen: Blood Updated: 01/23/21 1204     Glucose 227 mg/dL      Comment: : [id number] [employer] Euraisa[employer]eter ID: [id number]       POC Glucose Once [[id number]]  (Abnormal) Collected: 01/23/21 0735    Specimen: Blood Updated: 01/23/21 0805     Glucose 184 mg/dL      Comment: : [id number] Jerardo EuraisaMeter ID: [id number]       Basic Metabolic Panel [[id number]]  (Abnormal) Collected: 01/23/21 0408    Specimen: Blood Updated: 01/23/21 0458     Glucose 206 mg/dL      BUN 26 mg/dL      Creatinine 0.80 mg/dL      Sodium 131 mmol/L      Potassium 4.2 mmol/L      Chloride 96 mmol/L      CO2 26.0 mmol/L      Calcium 8.7 mg/dL      eGFR Non African Amer 95 mL/min/1.73      BUN/Creatinine Ratio 32.5     Anion Gap 9.0 mmol/L     Narrative:      GFR Normal >60  Chronic Kidney Disease <60  Kidney Failure <15      POC Glucose Once [[id number]]  (Abnormal) Collected: 01/22/21  1642    Specimen: Blood Updated: 01/22/21 1655     Glucose 143 mg/dL      Comment: : [id number] Micanopy HeatherMeter ID: [id number]       POC Glucose Once [[id number]]  (Abnormal) Collected: 01/22/21 1059    Specimen: Blood Updated: 01/22/21 1145     Glucose 313 mg/dL      Comment: : [id number] Sly LadonnaMeter ID: [id number]       POC Glucose Once [[id number]]  (Abnormal) Collected: 01/22/21 0715    Specimen: Blood Updated: 01/22/21 0800     Glucose 205 mg/dL      Comment: : [id number] Sly LadonnaMeter ID: [id number]       Comprehensive Metabolic Panel [[id number]]  (Abnormal) Collected: 01/22/21 0426    Specimen: Blood Updated: 01/22/21 0526     Glucose 213 mg/dL      BUN 24 mg/dL      Creatinine 0.73 mg/dL      Sodium 132 mmol/L      Potassium 3.9 mmol/L      Chloride 98 mmol/L      CO2 26.0 mmol/L      Calcium 8.8 mg/dL      Total Protein 5.6 g/dL      Albumin 3.00 g/dL      ALT (SGPT) 17 U/L      AST (SGOT) 23 U/L      Alkaline Phosphatase 81 U/L      Total Bilirubin 0.5 mg/dL      eGFR Non African Amer 105 mL/min/1.73      Globulin 2.6 gm/dL      A/G Ratio 1.2 g/dL      BUN/Creatinine Ratio 32.9     Anion Gap 8.0 mmol/L     Narrative:      GFR Normal >60  Chronic Kidney Disease <60  Kidney Failure <15      CBC (No Diff) [[id number]]  (Abnormal) Collected: 01/22/21 0426    Specimen: Blood Updated: 01/22/21 0455     WBC 11.54 10*3/mm3      RBC 3.97 10*6/mm3      Hemoglobin 11.2 g/dL      Hematocrit 33.6 %      MCV 84.6 fL      MCH 28.2 pg      MCHC 33.3 g/dL      RDW 13.0 %      RDW-SD 39.8 fl      MPV 10.5 fL      Platelets 312 10*3/mm3     POC Glucose Once [[id number]]  (Abnormal) Collected: 01/21/21 2016    Specimen: Blood Updated: 01/21/21 2027     Glucose 208 mg/dL      Comment: : [id number] Young Kwanter ID: [id number]       POC Glucose Once [[id number]]  (Abnormal) Collected: 01/21/21 1701    Specimen: Blood Updated: 01/21/21 1713     Glucose 295 mg/dL      Comment: :  [id number] Jerardo EuraisaMeter ID: [id number]       POC Glucose Once [[id number]]  (Abnormal) Collected: 01/21/21 1116    Specimen: Blood Updated: 01/21/21 1157     Glucose 269 mg/dL      Comment: : [id number] Sly LadonnaMeter ID: [id number]       POC Glucose Once [[id number]]  (Abnormal) Collected: 01/21/21 0713    Specimen: Blood Updated: 01/21/21 0733     Glucose 176 mg/dL      Comment: : [id number] Sly LadonnaMeter ID: [id number]       Basic Metabolic Panel [[id number]]  (Abnormal) Collected: 01/21/21 0312    Specimen: Blood Updated: 01/21/21 0407     Glucose 201 mg/dL      BUN 16 mg/dL      Creatinine 0.64 mg/dL      Sodium 133 mmol/L      Potassium 4.0 mmol/L      Chloride 96 mmol/L      CO2 26.0 mmol/L      Calcium 9.0 mg/dL      eGFR Non African Amer 123 mL/min/1.73      BUN/Creatinine Ratio 25.0     Anion Gap 11.0 mmol/L     Narrative:      GFR Normal >60  Chronic Kidney Disease <60  Kidney Failure <15      CBC (No Diff) [[id number]]  (Abnormal) Collected: 01/21/21 0312    Specimen: Blood Updated: 01/21/21 0353     WBC 15.57 10*3/mm3      RBC 3.96 10*6/mm3      Hemoglobin 11.6 g/dL      Hematocrit 33.2 %      MCV 83.8 fL      MCH 29.3 pg      MCHC 34.9 g/dL      RDW 12.9 %      RDW-SD 39.0 fl      MPV 11.0 fL      Platelets 226 10*3/mm3     POC Glucose Once [[id number]]  (Abnormal) Collected: 01/20/21 1949    Specimen: Blood Updated: 01/20/21 2019     Glucose 188 mg/dL      Comment: : [id number] Ash AddisonMeter ID: [id number]       POC Glucose Once [[id number]]  (Abnormal) Collected: 01/20/21 1613    Specimen: Blood Updated: 01/20/21 1629     Glucose 227 mg/dL      Comment: : [id number] Jason HeatherMeter ID: [id number]             Rhythm: Sinus    CXR: Stable, good expansion bilateral lungs and stable cardiac silhouette    Assessment/Plan     75-year-old male who is postop day 5 from average x4 with bilateral lower extremity vein harvest.  He is doing well, recovering appropriately.  He  is near his baseline weight now there is required higher doses than normal of diuretics.  He is moving his bowels and his belly is not distended.  He has been hemodynamically stable on current medications.    DC home today with scheduled follow-up  Home with 3 days of Lasix twice daily instructed patient to follow his weight closely and call if significant changes    Federico Garcia M.D.  Cardiothoracic Surgeon

## 2021-01-23 NOTE — THERAPY TREATMENT NOTE
Acute Care - Physical Therapy Treatment Note  [facility]     Patient Name: Wesley Eduardo  : 1945  MRN: [id number]  Today's Date: 2021           PT Assessment (last 12 hours)      PT Evaluation and Treatment     Row Name 21 0935 21 0904       Physical Therapy Time and Intention    Subjective Information  complains of;fatigue  -  --    Document Type  therapy note (daily note)  -  therapy note (daily note) Assisted pt to [facility]-Will check back for walk.   -    Mode of Treatment  physical therapy  -  --    Session Not Performed  --  other (see comments)  -    Row Name 21 0935          General Information    Existing Precautions/Restrictions  fall;sternal  -     Row Name 2135          Pain Scale: Word Pre/Post-Treatment    Pain: Word Scale, Pretreatment  0 - no pain  -     Posttreatment Pain Rating  0 - no pain  -     Row Name 2135          Bed Mobility    Comment (Bed Mobility)  up in chair  -     Row Name 21 0935          Transfers    Sit-Stand Jenkins (Transfers)  standby assist  -     Stand-Sit Jenkins (Transfers)  standby assist  -     Jenkins Level (Toilet Transfer)  standby assist  -     Assistive Device (Toilet Transfer)  commode  -     Row Name 2135          Toilet Transfer    Type (Toilet Transfer)  sit-stand  -     Row Name 21 0935          Gait/Stairs (Locomotion)    Jenkins Level (Gait)  standby assist  -     Distance in Feet (Gait)  300 one standing rest  -     Deviations/Abnormal Patterns (Gait)  base of support, narrow  -     Row Name 21 0935          Motor Skills    Therapeutic Exercise  -- cardiac protocol  -     Row Name             Wound 21 Left leg Incision    Wound - Properties Group Placement Date: 21  -DT Placement Time: 823  -DT Present on Hospital Admission: N  -DT Side: Left  -DT Location: leg  -DT Primary Wound Type: Incision  -DT    Retired Wound -  Properties Group Date first assessed: 01/18/21  -DT Time first assessed: 0823  -DT Present on Hospital Admission: N  -DT Side: Left  -DT Location: leg  -DT Primary Wound Type: Incision  -DT    Row Name             Wound 01/18/21 0940 sternal Incision    Wound - Properties Group Placement Date: 01/18/21  -DT Placement Time: 0940  -DT Present on Hospital Admission: N  -DT Location: sternal  -DT Primary Wound Type: Incision  -DT    Retired Wound - Properties Group Date first assessed: 01/18/21  -DT Time first assessed: 0940  -DT Present on Hospital Admission: N  -DT Location: sternal  -DT Primary Wound Type: Incision  -DT    Row Name             Wound 01/18/21 0953 Right lower leg Incision    Wound - Properties Group Placement Date: 01/18/21  -DT Placement Time: 0953  -DT Present on Hospital Admission: N  -DT Side: Right  -DT Orientation: lower  -DT Location: leg  -DT Primary Wound Type: Incision  -DT    Retired Wound - Properties Group Date first assessed: 01/18/21  -DT Time first assessed: 0953  -DT Present on Hospital Admission: N  -DT Side: Right  -DT Location: leg  -DT Primary Wound Type: Incision  -DT    Row Name 01/23/21 0935          Plan of Care Review    Plan of Care Reviewed With  patient  -MF     Progress  improving  -     Outcome Summary  Pt. agreeable to therapy, but anxious to return home. Pt. was SBA for transfers and gait. He walked 300' with one standing rest. Pt's O2 sat was 97% on room air while walking.   -     Row Name 01/23/21 0935          Vital Signs    Intra SpO2 (%)  97  -     O2 Delivery Intra Treatment  room air  -     Row Name 01/23/21 0935          Positioning and Restraints    Pre-Treatment Position  sitting in chair/recliner  -     Post Treatment Position  chair  -MF     In Chair  reclined;call light within reach;encouraged to call for assist;with family/caregiver  -       User Key  (r) = Recorded By, (t) = Taken By, (c) = Cosigned By    Initials Name Provider Type      Bethany Perez PTA Physical Therapy Assistant    Orville Morales, RN Registered Nurse        Physical Therapy Education                 Title: PT OT SLP Therapies (Done)     Topic: Physical Therapy (Done)     Point: Mobility training (Done)     Learning Progress Summary           Patient Acceptance, E, DU by  at 1/22/2021 1134    Comment: Improve breathing technique                   Point: Home exercise program (Done)     Learning Progress Summary           Patient Acceptance, E,TB, NR,VU by VIKA at 1/21/2021 1005    Comment: Incentive spirometer   Significant Other Acceptance, E,TB, NR,VU by VIKA at 1/21/2021 1005    Comment: Incentive spirometer                   Point: Body mechanics (Done)     Learning Progress Summary           Patient Acceptance, E, VU by  at 1/19/2021 0753    Comment: pt educated on sternal precautions, postural re-ed while ambulating                   Point: Precautions (Done)     Learning Progress Summary           Patient Acceptance, E, VU by  at 1/19/2021 0753    Comment: pt educated on sternal precautions, postural re-ed while ambulating                               User Key     Initials Effective Dates Name Provider Type Discipline     08/02/16 -  Kirsten Blandon PTA Physical Therapy Assistant PT     12/23/20 -  Irwin Rosas, PT Student PT Student PT              PT Recommendation and Plan     Plan of Care Reviewed With: patient  Progress: improving  Outcome Summary: Pt. agreeable to therapy, but anxious to return home. Pt. was SBA for transfers and gait. He walked 300' with one standing rest. Pt's O2 sat was 97% on room air while walking.        Time Calculation:   PT Charges     Row Name 01/23/21 0947             Time Calculation    Start Time  0935  -      Stop Time  0958  -      Time Calculation (min)  23 min  -      PT Received On  01/23/21  -         Time Calculation- PT    Total Timed Code Minutes- PT  23 minute(s)  -         Timed Charges     79248 - Gait Training Minutes   23  -        User Key  (r) = Recorded By, (t) = Taken By, (c) = Cosigned By    Initials Name Provider Type    Bethany Newberry PTA Physical Therapy Assistant        Therapy Charges for Today     Code Description Service Date Service Provider Modifiers Qty    [id number] HC GAIT TRAINING EA 15 MIN 1/23/2021 Bethany Perez PTA GP 2          PT G-Codes  Outcome Measure Options: AM-PAC 6 Clicks Basic Mobility (PT)  AM-PAC 6 Clicks Score (PT): 13    Bethany Perez PTA  1/23/2021

## 2021-01-24 ENCOUNTER — READMISSION MANAGEMENT (OUTPATIENT)
Dept: CALL CENTER | Facility: HOSPITAL | Age: 76
End: 2021-01-24

## 2021-01-24 NOTE — OP NOTE
Date of Procedure:  1/18/2021    Preoperative Diagnosis:   1.  3-vessel coronary artery disease [I25.10]  2.  NSTEMI  3.  Acute systolic congestive heart failure  4.  Type 2 diabetes mellitus  5.  Hypertension  6.  Hyperlipidemia      Postoperative Diagnosis:  3-vessel coronary artery disease [I25.10]    Procedures Performed:  1. Coronary artery bypass grafting-4 vessel (left internal mammary artery/left anterior descending, reverse saphenous vein graft/first obtuse marginal, reverse saphenous vein graft/ramus intermedius, and reverse saphenous vein graft/Posterior descending artery)  2. Left endoscopic vein harvest  3. Right open vein harvest    Surgeon:  Reji Wood MD  Assistants:  Tala Hogan   Anesthesia Staff:  CRNA: Ulices Candelario CRNA; Alfredito Fay MD  Anesthesia Type:  General    Estimated Blood Loss:  Minimal (Cell Saver)  Drains:  1. 19 [place] Arthur drain-left pleural space  2. 24 [place] Arthur drains-anterior and posterior mediastinum    Specimens:  None       Operative Findings:  Excellent arterial conduit. Good venous conduit for left system.  PDA graft was acceptable-good conduit. The LAD at site of grafting measured 2 mm in size and had mild atherosclerotic disease burden. Post bypass grafting had excellent arterial runoff.  The OM artery measured 1.8 mm in size and had mild atherosclerotic disease burden.  Post bypass grafting had excellent arterial runoff. The RI at site of grafting measured 2 mm in size and had mild atherosclerotic disease burden. Post bypass grafting had excellent arterial runoff.The posterior descending artery measured 1.6 mm in size and had excellent arterial runoff with moderate atherosclerotic disease burden.  Transesophageal echocardiography salient findings include improved preserved left ventricular function with no significant valvular dysfunction.       Total aortic cross-clamp time: 124 minutes  Total cardiac bypass time: 177 minutes     Operative  description in detail:  The patient was taken to the operative suite where he  was placed in a supine position.  Induction of general anesthesia and placement of a single-lumen endotracheal tube was performed without remark.  Appropriate arterial and venous access was established without remark.  Through the previously placed right internal jugular central venous line, a pulmonary artery catheter was floated into position.  The patient was then prepped and draped in the usual and sterile surgical fashion.  A timeout was performed.  Perioperative antibiotics were administered. Beta blocker was given.    A two team approach was utilized to harvest both the left internal mammary artery and the left greater saphenous vein.   Briefly the left greater saphenous vein was taken at the level of the knee medially and taken in a prograde fashion for an anticipated length of vein to the fossa ovalis.  Blunt dissection was performed and each branch was taken using the Massage Envy device.  A counter stab incision was made with both proximal and distal control obtained.  The vein was extracted from a hemostatic tunnel.  The vein was taken in a mirrored fashion retrograde towards the ankle.  The leg was closed in a layered fashion with Vicryl suture.  The vein was prepared without remark.   Not all the vein was even acceptable.  Open vein harvest was performed right LE.  Briefly the right greater saphenous vein was taken at the level of the ankle open and taken in a prograde fashion for an anticipated length of vein.  All side branches were ligated in continuity and divided.  The vein was extracted from a hemostatic bed.  The leg was closed in a layered fashion with Vicryl suture.  The vein was prepared without remark.    While the vein was being harvested, median sternotomy was performed by me. Pericardial fat in midline from the level of the innominate vein to the level of the diaphragm was divided in midline.  A Rultract device was  used to expose the posterior sternal table.  The left internal mammary artery was taken down using a standard pedicle technique with a combination of electrocautery and/or clips to control all side branches.  At that time, the patient was systemically anticoagulated with IV heparin.  A 19 [place] Arthur drain was placed in the left pleural space.  After suitable time of circulating heparin, clips were placed doubly onto the mammary artery distally and it was divided proximal to the previously placed clips.  It had excellent arterial inflow.  The mammary artery was controlled distally.  The mammary artery harvest bed was hemostatic.  The Rultract device was removed from the sterile field and a Genesse retractor was used for exposure.  The mammary artery was prepared for bypass grafting and deemed excellent.  A pericardial well was created. I elected to cannulate the heart centrally accessing distally the ascending aorta and the right atrial appendage.  Each cannula was placed in continuity with the appropriate pump line. Retrograde autologous prime was completed as indicated.  A combined cardioplegia/aortic root vent set was secured with a horizontal mattress 4-0 Prolene suture.  With an appropriate ACT and all in readiness, cardiopulmonary bypass was commenced.  I dissected out the first obtuse marginal, ramus intermedius, PDA, and the LAD for suitable sites for bypass grafting.  With that, I proceeded to apply the aortic cross-clamp and administered cardioplegia per protocol.  Electrical-mechanical arrest was achieved.  We did implement systemic hypothermia mild and apply topical hypothermia to the ventricle.      I directed my attention towards the PDA.  A coronary arteriotomy was made and augmented to size with Arizmendi scissors.  It is as per operative findings.  The anastomosis was constructed in an end-to-side orientation with running 7-0 Prolene suture.  With that its proximal anastomosis was  constructed  following aortotomy with 11 blade and augmented size with 4 mm arterial punch subsequently. This was constructed in a side aorta end vein graft fashion with running 6-0 Prolene suture. An AC  was placed.  The graft was assessed for lay which was excellent. It is without tension or torsion.     To that end I directed my attention towards the first obtuse marginal.  A coronary arteriotomy was made and augmented to size with Arizmendi scissors.  It is as per operative findings.  The anastomosis was constructed in an end-to-side orientation with running 7-0 Prolene suture.  With that its proximal anastomosis was constructed following aortotomy with 11 blade and augmented size with 4 mm arterial punch subsequently This was constructed in a side aorta end vein graft fashion with running 6-0 Prolene suture. An AC  was placed.  The graft was assessed for lay which was excellent.  It is without tension or torsion.   I directed my attention towards the RI.  A coronary arteriotomy was made and augmented to size with Arizmendi scissors.  It is as per operative findings.  The anastomosis was constructed in an end-to-side orientation with running 7-0 Prolene suture.  With that its proximal anastomosis was  constructed following aortotomy with 11 blade and augmented size with 4 mm arterial punch subsequently.  This was constructed in a side aorta end vein graft fashion with running 6-0 Prolene suture. An AC  was placed.  The graft was assessed for lay which was excellent. It is without tension or torsion.     During a dose of cardioplegia, a pericardial slit left lateral was made while dividing associate pericardiophrenic fat and vasculature while being mindful of the lay of the phrenic nerve.  As such I proceeded to obtain control proximally onto the mammary artery and spatulated it distally.  I grafted this onto the LAD following coronary arteriotomy using previous described techniques.  The anastomosis was  constructed in an end-to-side orientation with running 8-0 Prolene suture.  It is as per operative findings and the anastomosis was hemostatic.  I did tack the mammary artery pedicle to the anterior aspect heart with 6-0 Prolene sutures.    With that being accomplished, a terminal hotshot was administered.  The patient was placed in trendelenburg position.  Upon completion of terminal hotshot and placement of temporary epicardial pacing wires, with the aortic vent on high and pump flows diminished, the aortic cross-clamp was released.  With that, full support was implemented.  A perfusable rhythm was established. A nonworking beating phase was implemented.  Ventilation restored.  I surveyed each graft and each anastomosis was hemostatic and had excellent lay.  With all in readiness, the heart was allowed to fill.  De-airing maneuvers were performed as guided by transesophageal echocardiography.  With that the heart was decompressed and we removed the aortic root vent/cardioplegia cannula set.  Its associated pursestring suture was tied securely and this was reinforced as per matter of routine. The table was now placed in neutral position.  With all in readiness, we proceeded to wean from and separate from cardiopulmonary bypass.  I did decannulate the venous line and snared down its associated pursestring suture.  Systemic intravenous protamine was administered.  All associated blood volume was returned to the patient. With continued good hemodynamics, I decannulate the arterial line and tied down it associated pursestring sutures.  At this time I tied down the previously snared pursestring suture.  The mediastinum was drained with 24 [place] Arthur drains placed anteriorly and posteriorly.  I surveyed the chest and hemostasis was pristine.  With that I impregnated the sternal edges with vancomycin, thrombin, and Gelfoam paste.  The sternum was reapproximated with stainless sterile wires placed in an interrupted  fashion.  In layers anatomically the soft tissue planes were reapproximated. Instruments, sharps, and sponge counts were reported as correct.      Complications: None     Disposition: Transferred to ICU in stable and guarded condition.

## 2021-01-24 NOTE — OUTREACH NOTE
Prep Survey      Responses   [facility] facility patient discharged from?  [facility]   Is LACE score < 7 ?  No   Emergency Room discharge w/ pulse ox?  No   Eligibility  Readm Mgmt   Discharge diagnosis  CORONARY ARTERY BYPASS  X4,  Acute CHF   Does the patient have one of the following disease processes/diagnoses(primary or secondary)?  Cardiothoracic surgery   Does the patient have Home health ordered?  No   Is there a DME ordered?  No   Prep survey completed?  Yes          Carleen Andujar RN

## 2021-01-25 PROBLEM — J96.01 ACUTE RESPIRATORY FAILURE WITH HYPOXIA (HCC): Status: ACTIVE | Noted: 2021-01-25

## 2021-01-25 PROBLEM — D69.1 ASPIRIN-LIKE PLATELET FUNCTION DEFECT: Status: ACTIVE | Noted: 2021-01-25

## 2021-01-25 PROBLEM — I65.22 STENOSIS OF LEFT CAROTID ARTERY: Status: ACTIVE | Noted: 2021-01-25

## 2021-01-25 NOTE — PROGRESS NOTES
"Subjective   Chief Complaint   Patient presents with   • Post-op Follow-up     CABG x4 on 1/18     Patient ID: Wesley Eduardo is a 75 y.o. male who is here for follow-up having had Coronary artery bypass grafting-4 vessel (left internal mammary artery/left anterior descending, reverse saphenous vein graft/first obtuse marginal, reverse saphenous vein graft/ramus intermedius, and reverse saphenous vein graft/Posterior descending artery), Left endoscopic vein harvest, Right open vein harvest performed on 1/18/2021 by Dr. Wood.     History of Present Illness  Post operative recovery was uneventful without any major complications. He returns today for 1 week follow up. States he is doing great! Sleep habits are great-he is sleeping through the night. Pain control has been great-he is not taking pain medication. No fevers/sweats/chills. No drainage from incisions. No sternal clicks. No chest pain or shortness of breath. Appetite is good and is improving. Nausea has resolved. Glycemic control is good. Ambulating 5 minutes twice daily. He is in a wheel chair as he had labs drawn prior to this appointment. He has not been drinking a lot of water due to hyponatremia. He is joined by his wife. Completed lasix and potassium Rx yesterday. Weight down 4 pounds.     The following portions of the patient's history were reviewed and updated as appropriate: allergies, current medications, past family history, past medical history, past social history, past surgical history and problem list.    Review of Systems   Constitutional: Negative for chills and diaphoresis.   Respiratory: Negative for shortness of breath.    Cardiovascular: Positive for leg swelling (improving ). Negative for chest pain.       Objective   Visit Vitals  /66 (BP Location: Right arm, Patient Position: Sitting, Cuff Size: Adult)   Pulse 58   Ht 175.3 cm (69.02\")   Wt 87.8 kg (193 lb 9.6 oz)   SpO2 93%   BMI 28.57 kg/m²       Physical Exam  Vitals signs " reviewed.   Constitutional:       General: He is not in acute distress.     Appearance: Normal appearance. He is well-developed. He is not diaphoretic.   HENT:      Head: Normocephalic and atraumatic.   Eyes:      Pupils: Pupils are equal, round, and reactive to light.   Neck:      Musculoskeletal: Normal range of motion and neck supple.   Cardiovascular:      Rate and Rhythm: Normal rate and regular rhythm.      Heart sounds: Normal heart sounds. No murmur. No friction rub.      Comments: HR 83  Pulmonary:      Effort: Pulmonary effort is normal. No respiratory distress.      Breath sounds: Normal breath sounds. No wheezing or rales.   Abdominal:      General: There is no distension.      Palpations: Abdomen is soft.      Tenderness: There is no abdominal tenderness. There is no guarding.   Musculoskeletal:      Right lower leg: Edema (minimal ) present.      Left lower leg: Edema (minimal ) present.   Skin:     General: Skin is warm and dry.      Coloration: Skin is not pale.      Findings: No rash.      Comments: Sternotomy site clean, dry, and intact. Sternum stable. No clicks. Bilateral saphenectomy sites clean, dry, and intact.    Neurological:      General: No focal deficit present.      Mental Status: He is alert and oriented to person, place, and time. Mental status is at baseline.   Psychiatric:         Mood and Affect: Mood normal.         Behavior: Behavior normal.       BMP pending     Assessment/Plan         Diagnoses and all orders for this visit:    1. 3-vessel coronary artery disease (Primary)    2. Acute systolic congestive heart failure (CMS/HCC)    3. NSTEMI (non-ST elevated myocardial infarction) (CMS/HCC)    4. Type 2 diabetes mellitus with other circulatory complication, without long-term current use of insulin (CMS/HCC)    5. Acute respiratory failure with hypoxia (CMS/HCC)    6. Hyponatremia    7. Stenosis of left carotid artery           Overall, Wesley Eduardo is doing well.  Surgical sites  are clean and dry without evidence of infection. BMP is pending (labs sent to [facility] for processing). Will call patient with results once available. Patient and wife agreeable. We discussed current sternotomy precautions and how these will not be advanced yet. Following post op cardiac surgery home instructions. Provided support and encouragement. All questions have been answered to the best of my ability. Will discuss starting cardiac rehabilitation at official 1 month post op visit. Patient has follow up with Dr. Wood in a few weeks. Patient has follow up with Cardiology in a few weeks. Patient has been instructed to contact our office with any questions or concerns should they arise prior to the next office visit.     Patient's Body mass index is 28.57 kg/m². BMI is above normal parameters. Recommendations include: educational material and referral to primary care.    Wesley Eduardo is a non-smoker and therefore does not need tobacco cessation education/counseling.    Wesley Eduardo  reports that he has never smoked. He has never used smokeless tobacco.      Advance Care Planning   ACP discussion was declined by the patient. Patient does not have an advance directive, declines further assistance.

## 2021-01-26 ENCOUNTER — READMISSION MANAGEMENT (OUTPATIENT)
Dept: CALL CENTER | Facility: HOSPITAL | Age: 76
End: 2021-01-26

## 2021-01-26 DIAGNOSIS — E87.1 HYPONATREMIA: Primary | ICD-10-CM

## 2021-01-26 NOTE — OUTREACH NOTE
CT Surgery Week 1 Survey      Responses   [facility] [place] patient discharged from?  [place]   Does the patient have one of the following disease processes/diagnoses(primary or secondary)?  Cardiothoracic surgery   Week 1 attempt successful?  Yes   Call start time  1011   Call end time  1017   Discharge diagnosis  CORONARY ARTERY BYPASS  X4,  Acute CHF   Person spoke with today (if not patient) and relationship  wife, Tanvi Medina reviewed with patient/caregiver?  Yes   Is the patient having any side effects they believe may be caused by any medication additions or changes?  No   Does the patient have all medications related to this admission filled (includes all antibiotics, pain medications, cardiac medications, etc.)  Yes   Is the patient taking all medications as directed (includes completed medication regime)?  Yes   Does the patient have a primary care provider?   Yes   Does the patient have an appointment scheduled with their C/T surgeon?  Yes   Has the patient kept scheduled appointments due by today?  N/A   Comments  PCP 01/29/2021   CT office  01/27/2021   Has home health visited the patient within 72 hours of discharge?  N/A   Psychosocial issues?  No   Did the patient receive a copy of their discharge instructions?  Yes   Nursing interventions  Reviewed instructions with patient   What is the patient's perception of their health status since discharge?  Improving   Nursing interventions  Nurse provided patient education   Is the patient/caregiver able to teach back normal signs of recovery?  Nausea and lack of appetite, Depression or irritability, Pain or discomfort at incisional site, Constipation   Nursing interventions  Reassured on normal signs of recovery   Is the patient /caregiver able to teach back basic post-op care?  Practice 'cough and deep breath', Continue use of incentive spirometry at least 1 week post discharge, Drive as instructed by MD in discharge instructions, Take showers only when  approved by MD-sponge bathe until then, No tub bath, swimming, or hot tub until instructed by MD, Keep incision areas clean, dry and protected, Lifting as instructed by MD in discharge instructions   Is the patient/caregiver able to teach back signs and symptoms of incisional infection?  Increased redness, swelling or pain at the incisonal site, Increased drainage or bleeding, Incisional warmth, Pus or odor from incision, Fever   Is the patient/caregiver able to teach back steps to recovery at home?  Set small, achievable goals for return to baseline health, Weigh daily, Practice good oral hygiene, Eat a well-balance diet   Is the patient /caregiver able to teach back the importance of cardiac rehab?  Yes   Nursing interventions  Provided education on importance of cardiac rehab   Is the patient/caregiver able to teach back the hierarchy of who to call/visit for symptoms/problems? PCP, Specialist, Home health nurse, Urgent Care, ED, 911  Yes   Week 1 call completed?  Yes            Claudia aCnnon RN

## 2021-01-27 ENCOUNTER — LAB (OUTPATIENT)
Dept: LAB | Facility: HOSPITAL | Age: 76
End: 2021-01-27

## 2021-01-27 ENCOUNTER — OFFICE VISIT (OUTPATIENT)
Dept: CARDIAC SURGERY | Facility: CLINIC | Age: 76
End: 2021-01-27

## 2021-01-27 VITALS
BODY MASS INDEX: 28.68 KG/M2 | HEIGHT: 69 IN | HEART RATE: 58 BPM | WEIGHT: 193.6 LBS | OXYGEN SATURATION: 93 % | SYSTOLIC BLOOD PRESSURE: 105 MMHG | DIASTOLIC BLOOD PRESSURE: 66 MMHG

## 2021-01-27 DIAGNOSIS — I50.21 ACUTE SYSTOLIC CONGESTIVE HEART FAILURE (HCC): ICD-10-CM

## 2021-01-27 DIAGNOSIS — E11.59 TYPE 2 DIABETES MELLITUS WITH OTHER CIRCULATORY COMPLICATION, WITHOUT LONG-TERM CURRENT USE OF INSULIN (HCC): ICD-10-CM

## 2021-01-27 DIAGNOSIS — J96.01 ACUTE RESPIRATORY FAILURE WITH HYPOXIA (HCC): ICD-10-CM

## 2021-01-27 DIAGNOSIS — E87.1 HYPONATREMIA: ICD-10-CM

## 2021-01-27 DIAGNOSIS — I21.4 NSTEMI (NON-ST ELEVATED MYOCARDIAL INFARCTION) (HCC): ICD-10-CM

## 2021-01-27 DIAGNOSIS — I65.22 STENOSIS OF LEFT CAROTID ARTERY: ICD-10-CM

## 2021-01-27 DIAGNOSIS — I25.10 3-VESSEL CORONARY ARTERY DISEASE: Primary | ICD-10-CM

## 2021-01-27 LAB
ANION GAP SERPL CALCULATED.3IONS-SCNC: 10.6 MMOL/L (ref 5–15)
BUN SERPL-MCNC: 16 MG/DL (ref 8–23)
BUN/CREAT SERPL: 18.2 (ref 7–25)
CALCIUM SPEC-SCNC: 9.4 MG/DL (ref 8.6–10.5)
CHLORIDE SERPL-SCNC: 95 MMOL/L (ref 98–107)
CO2 SERPL-SCNC: 28.4 MMOL/L (ref 22–29)
CREAT SERPL-MCNC: 0.88 MG/DL (ref 0.76–1.27)
GFR SERPL CREATININE-BSD FRML MDRD: 85 ML/MIN/1.73
GLUCOSE SERPL-MCNC: 143 MG/DL (ref 65–99)
POTASSIUM SERPL-SCNC: 4.3 MMOL/L (ref 3.5–5.2)
SODIUM SERPL-SCNC: 134 MMOL/L (ref 136–145)

## 2021-01-27 PROCEDURE — 36415 COLL VENOUS BLD VENIPUNCTURE: CPT

## 2021-01-27 PROCEDURE — 99024 POSTOP FOLLOW-UP VISIT: CPT | Performed by: NURSE PRACTITIONER

## 2021-01-27 PROCEDURE — 80048 BASIC METABOLIC PNL TOTAL CA: CPT

## 2021-01-28 ENCOUNTER — DOCUMENTATION (OUTPATIENT)
Dept: CARDIAC SURGERY | Facility: CLINIC | Age: 76
End: 2021-01-28

## 2021-01-28 NOTE — PROGRESS NOTES
No visits with results within 1 Day(s) from this visit.   Latest known visit with results is:   Lab on 01/27/2021   Component Date Value Ref Range Status   • Glucose 01/27/2021 143* 65 - 99 mg/dL Final   • BUN 01/27/2021 16  8 - 23 mg/dL Final   • Creatinine 01/27/2021 0.88  0.76 - 1.27 mg/dL Final   • Sodium 01/27/2021 134* 136 - 145 mmol/L Final   • Potassium 01/27/2021 4.3  3.5 - 5.2 mmol/L Final   • Chloride 01/27/2021 95* 98 - 107 mmol/L Final   • CO2 01/27/2021 28.4  22.0 - 29.0 mmol/L Final   • Calcium 01/27/2021 9.4  8.6 - 10.5 mg/dL Final   • eGFR Non African Amer 01/27/2021 85  >60 mL/min/1.73 Final   • BUN/Creatinine Ratio 01/27/2021 18.2  7.0 - 25.0 Final   • Anion Gap 01/27/2021 10.6  5.0 - 15.0 mmol/L Final     Called patient to discuss results. Wife answered and informed her of improved sodium level. No dietary restrictions. Keep scheduled follow up. Wife verbalized understanding.

## 2021-02-01 ENCOUNTER — READMISSION MANAGEMENT (OUTPATIENT)
Dept: CALL CENTER | Facility: HOSPITAL | Age: 76
End: 2021-02-01

## 2021-02-01 NOTE — OUTREACH NOTE
"CT Surgery Week 2 Survey      Responses   [facility] patient discharged from?  [place]   Does the patient have one of the following disease processes/diagnoses(primary or secondary)?  Cardiothoracic surgery   Week 2 attempt successful?  Yes   Call start time  1754   Call end time  1758   Discharge diagnosis  CORONARY ARTERY BYPASS  X4,  Acute CHF   Meds reviewed with patient/caregiver?  Yes   Is the patient having any side effects they believe may be caused by any medication additions or changes?  No   Does the patient have all medications related to this admission filled (includes all antibiotics, pain medications, cardiac medications, etc.)  Yes   Is the patient taking all medications as directed (includes completed medication regime)?  Yes   Does the patient have a primary care provider?   Yes   Does the patient have an appointment scheduled with their C/T surgeon?  Yes   Comments regarding PCP  PCP 01/29/21.  02/24/21 on 2/24.   Has the patient kept scheduled appointments due by today?  Yes   Psychosocial issues?  No   Comments  Patient reports on left leg scab was \"pulled off\" but looks ok, no s/sx of infection   Did the patient receive a copy of their discharge instructions?  Yes   Nursing interventions  Reviewed instructions with patient   What is the patient's perception of their health status since discharge?  Improving   Is the patient /caregiver able to teach back the importance of cardiac rehab?  Yes   If the patient is a current smoker, are they able to teach back resources for cessation?  Not a smoker   Is the patient/caregiver able to teach back the hierarchy of who to call/visit for symptoms/problems? PCP, Specialist, Home health nurse, Urgent Care, ED, 911  Yes   Week 2 call completed?  Yes   Wrap up additional comments  Patient reports he is doing well.  He denies needs during this call.           Maria R Blakely RN  "

## 2021-02-09 ENCOUNTER — READMISSION MANAGEMENT (OUTPATIENT)
Dept: CALL CENTER | Facility: HOSPITAL | Age: 76
End: 2021-02-09

## 2021-02-09 NOTE — OUTREACH NOTE
CT Surgery Week 3 Survey      Responses   [facility] patient discharged from?  [place]   Does the patient have one of the following disease processes/diagnoses(primary or secondary)?  Cardiothoracic surgery   Week 3 attempt successful?  Yes   Call start time  1446   Call end time  1450   Discharge diagnosis  CORONARY ARTERY BYPASS  X4,  Acute CHF   Is patient permission given to speak with other caregiver?  Yes   List who call center can speak with  wife   Meds reviewed with patient/caregiver?  Yes   Is the patient taking all medications as directed (includes completed medication regime)?  Yes   Has the patient kept scheduled appointments due by today?  Yes   Psychosocial issues?  No   Comments  Leg incision healing well, scab is starting to come off. Chest incision healed well.    What is the patient's perception of their health status since discharge?  Improving   Is the patient /caregiver able to teach back basic post-op care?  Continue use of incentive spirometry at least 1 week post discharge, Practice 'cough and deep breath'   Is the patient/caregiver able to teach back steps to recovery at home?  Set small, achievable goals for return to baseline health, Rest and rebuild strength, gradually increase activity   Is the patient/caregiver able to teach back the hierarchy of who to call/visit for symptoms/problems? PCP, Specialist, Home health nurse, Urgent Care, ED, 911  Yes   Week 3 call completed?  Yes          Annette Mijares RN

## 2021-02-12 ENCOUNTER — TELEPHONE (OUTPATIENT)
Dept: CARDIAC SURGERY | Facility: CLINIC | Age: 76
End: 2021-02-12

## 2021-02-12 NOTE — TELEPHONE ENCOUNTER
Per verbal instruction from Viky ORTIZ, no way to [facility] at this time.  Asked if there was a local pharm they would like for another script to hold pt over until could see cardio and they would determine if pt will remain on these meds long term or not.  She voiced understanding and stated they have a refill remaining at the local pharm.  [facility] just [facility] as it is [facility] and they will mail meds to pt if he will be on this long term.  She will contact pt's local pharm to arrange a refill/kahm

## 2021-02-12 NOTE — TELEPHONE ENCOUNTER
Wife calling re: meds.  States pt was put on a new med (Metoprolol Tartrate 25mg) and strength on another med was changed (Ramipril 5mg).  Pt will need to refill these next week.  Does not see Dr Wood until 2-24-21.  If pt will be staying on these meds long term, they would like scripts sent in to [facility] for these instead of using the local pharm.  Can reach her at #[phone]/william

## 2021-02-19 ENCOUNTER — READMISSION MANAGEMENT (OUTPATIENT)
Dept: CALL CENTER | Facility: HOSPITAL | Age: 76
End: 2021-02-19

## 2021-02-19 NOTE — OUTREACH NOTE
CT Surgery Week 4 Survey      Responses   [facility] patient discharged from?  [place]   Does the patient have one of the following disease processes/diagnoses(primary or secondary)?  Cardiothoracic surgery   Week 4 attempt successful?  Yes   Call start time  1122   Call end time  1125   Discharge diagnosis  CORONARY ARTERY BYPASS  X4,  Acute CHF   Is patient permission given to speak with other caregiver?  Yes   List who call center can speak with  wife, Tanvi   Person spoke with today (if not patient) and relationship  patient   Meds reviewed with patient/caregiver?  Yes   Is the patient taking all medications as directed (includes completed medication regime)?  Yes   Has the patient kept scheduled appointments due by today?  Yes   Is the patient still receiving Home Health Services?  No   Psychosocial issues?  No   What is the patient's perception of their health status since discharge?  Improving [Patient reports wounds healing well. ]   Nursing interventions  Nurse provided patient education   Is the patient/caregiver able to teach back steps to recovery at home?  Set small, achievable goals for return to baseline health, Rest and rebuild strength, gradually increase activity   If the patient is a current smoker, are they able to teach back resources for cessation?  Not a smoker   Is the patient/caregiver able to teach back the hierarchy of who to call/visit for symptoms/problems? PCP, Specialist, Home health nurse, Urgent Care, ED, 911  Yes   Week 4 Call Completed?  Yes   Would the patient like one additional call?  No   Graduated  Yes   Is the patient interested in additional calls from an ambulatory ?  NOTE:  applies to high risk patients requiring additional follow-up.  No   Did the patient feel the follow up calls were helpful during their recovery period?  Yes   Was the number of calls appropriate?  Yes   Wrap up additional comments  Patient reports that he is doing well. Denies any new  questions or needs today.           Salome Pace RN

## 2021-02-22 ENCOUNTER — TELEPHONE (OUTPATIENT)
Dept: CARDIAC SURGERY | Facility: CLINIC | Age: 76
End: 2021-02-22

## 2021-02-22 NOTE — TELEPHONE ENCOUNTER
Wife left  message asking if pt needed to come for labs ahead of appt with Dr Wood on 2-24-21.  I do not see any current orders but wanted to check before I called pt back.  Can reach pt at #[phone]/ever

## 2021-02-24 ENCOUNTER — OFFICE VISIT (OUTPATIENT)
Dept: CARDIAC SURGERY | Facility: CLINIC | Age: 76
End: 2021-02-24

## 2021-02-24 VITALS
BODY MASS INDEX: 29.15 KG/M2 | HEIGHT: 69 IN | OXYGEN SATURATION: 90 % | WEIGHT: 196.8 LBS | HEART RATE: 49 BPM | SYSTOLIC BLOOD PRESSURE: 120 MMHG | DIASTOLIC BLOOD PRESSURE: 68 MMHG

## 2021-02-24 DIAGNOSIS — I25.10 3-VESSEL CORONARY ARTERY DISEASE: ICD-10-CM

## 2021-02-24 DIAGNOSIS — D69.1 ASPIRIN-LIKE PLATELET FUNCTION DEFECT (HCC): ICD-10-CM

## 2021-02-24 DIAGNOSIS — I21.4 NSTEMI (NON-ST ELEVATED MYOCARDIAL INFARCTION) (HCC): Primary | ICD-10-CM

## 2021-02-24 DIAGNOSIS — E11.9 TYPE 2 DIABETES MELLITUS WITHOUT COMPLICATION, WITHOUT LONG-TERM CURRENT USE OF INSULIN (HCC): ICD-10-CM

## 2021-02-24 DIAGNOSIS — I25.810 CORONARY ARTERY DISEASE INVOLVING CORONARY BYPASS GRAFT OF NATIVE HEART WITHOUT ANGINA PECTORIS: ICD-10-CM

## 2021-02-24 DIAGNOSIS — I50.22 CHRONIC SYSTOLIC CONGESTIVE HEART FAILURE (HCC): ICD-10-CM

## 2021-02-24 PROCEDURE — 99024 POSTOP FOLLOW-UP VISIT: CPT | Performed by: THORACIC SURGERY (CARDIOTHORACIC VASCULAR SURGERY)

## 2021-03-08 ENCOUNTER — OFFICE VISIT (OUTPATIENT)
Dept: CARDIOLOGY | Facility: CLINIC | Age: 76
End: 2021-03-08

## 2021-03-08 VITALS
HEART RATE: 88 BPM | WEIGHT: 185 LBS | OXYGEN SATURATION: 98 % | DIASTOLIC BLOOD PRESSURE: 72 MMHG | BODY MASS INDEX: 27.4 KG/M2 | HEIGHT: 69 IN | SYSTOLIC BLOOD PRESSURE: 120 MMHG

## 2021-03-08 DIAGNOSIS — I65.22 STENOSIS OF LEFT CAROTID ARTERY: ICD-10-CM

## 2021-03-08 DIAGNOSIS — I25.810 CORONARY ARTERY DISEASE INVOLVING CORONARY BYPASS GRAFT OF NATIVE HEART WITHOUT ANGINA PECTORIS: ICD-10-CM

## 2021-03-08 DIAGNOSIS — E78.5 HYPERLIPIDEMIA, UNSPECIFIED HYPERLIPIDEMIA TYPE: ICD-10-CM

## 2021-03-08 DIAGNOSIS — I10 ESSENTIAL HYPERTENSION: ICD-10-CM

## 2021-03-08 DIAGNOSIS — I50.22 CHRONIC SYSTOLIC CONGESTIVE HEART FAILURE (HCC): Primary | ICD-10-CM

## 2021-03-08 DIAGNOSIS — I21.4 NSTEMI (NON-ST ELEVATED MYOCARDIAL INFARCTION) (HCC): ICD-10-CM

## 2021-03-08 DIAGNOSIS — E11.9 TYPE 2 DIABETES MELLITUS WITHOUT COMPLICATION, WITHOUT LONG-TERM CURRENT USE OF INSULIN (HCC): ICD-10-CM

## 2021-03-08 PROBLEM — J96.01 ACUTE RESPIRATORY FAILURE WITH HYPOXIA: Status: RESOLVED | Noted: 2021-01-25 | Resolved: 2021-03-08

## 2021-03-08 PROCEDURE — 99214 OFFICE O/P EST MOD 30 MIN: CPT | Performed by: INTERNAL MEDICINE

## 2021-03-08 RX ORDER — METOPROLOL SUCCINATE 100 MG/1
100 TABLET, EXTENDED RELEASE ORAL DAILY
Qty: 90 TABLET | Refills: 3 | Status: SHIPPED | OUTPATIENT
Start: 2021-03-08 | End: 2021-04-27 | Stop reason: SDUPTHER

## 2021-03-08 RX ORDER — RAMIPRIL 5 MG/1
5 CAPSULE ORAL
Qty: 90 CAPSULE | Refills: 3 | Status: SHIPPED | OUTPATIENT
Start: 2021-03-08 | End: 2021-04-27 | Stop reason: SDUPTHER

## 2021-03-08 NOTE — PROGRESS NOTES
Reason for Visit: cardiovascular follow up.    HPI:  Wesley Eduardo is a 75 y.o. male is here today for follow-up.  He was admitted in January for acute on chronic systolic congestive heart failure.  Echo demonstrated EF of 36 to 40% with regional wall motion abnormalities.  Medical therapy was optimized he underwent cardiac catheterization on 1/11/2021 which showed severe three-vessel CAD.  He underwent four-vessel CABG on 1/18/2021.  He is active and declines cardiac rehab.  He does he continue the same activities well at home.  He denies any chest pain since discharge.  His energy level has gradually improved.  He feels his breathing is at baseline.  His only complaint is constipation.  He has had to take laxatives to keep his bowels going.  He feels like he could probably consume more fiber in his diet.    Previous Cardiac Testing and Procedures:  -CTA chest (1/8/2021) no evidence of pulmonary emboli, pleural effusions, central groundglass opacities, interlobular septal thickening most concerning for fluid overload  -Echo (1/8/2020) EF 36-40%, multiple regional wall motion abnormalities, grade 1 diastolic dysfunction, normal RV size and function, no significant valve dysfunction  -Hemoglobin A1c (1/9/2021) 7.1%  -Lipid panel (1/9/2021) total cholesterol 151, HDL 38, LDL 82, triglycerides 179  -LHC (1/11/2021) severe three-vessel CAD, mildly elevated left heart filling pressures  -Carotid ultrasound (1/12/2021) 50-69% left ICA, < 50% right ICA  -Echo (1/22/2021) EF 36-40%, regional wall motion abnormalities  -4-vessel CABG (1/18/2021) LIMA to LAD, SVG to OM, SVG to intermedius, SVG to PDA    Patient Active Problem List   Diagnosis   • NSTEMI (non-ST elevated myocardial infarction) (CMS/[facility])   • Type 2 diabetes mellitus without complication, without long-term current use of insulin (CMS/[facility])   • Essential hypertension   • Hyperlipidemia   • Chronic systolic congestive heart failure (CMS/[facility])   • 3-vessel coronary  artery disease   • Hyponatremia   • Aspirin-like platelet function defect (CMS/HCC)   • Stenosis of left carotid artery   • Coronary artery disease involving coronary bypass graft of native heart without angina pectoris       Social History     Tobacco Use   • Smoking status: Never Smoker   • Smokeless tobacco: Never Used   Substance Use Topics   • Alcohol use: Never   • Drug use: Never       History reviewed. No pertinent family history.    .      Current Outpatient Medications:   •  amitriptyline (ELAVIL) 100 MG tablet, Take 100 mg by mouth Every Night., Disp: , Rfl:   •  amLODIPine (NORVASC) 5 MG tablet, Take 5 mg by mouth Daily., Disp: , Rfl:   •  aspirin 81 MG EC tablet, Take 81 mg by mouth Daily., Disp: , Rfl:   •  clopidogrel (PLAVIX) 75 MG tablet, Take 75 mg by mouth Daily., Disp: , Rfl:   •  gabapentin (NEURONTIN) 600 MG tablet, Take 600 mg by mouth 4 (Four) Times a Day., Disp: , Rfl:   •  indapamide (LOZOL) 1.25 MG tablet, Take 1.25 mg by mouth Every Morning., Disp: , Rfl:   •  metFORMIN (GLUCOPHAGE) 1000 MG tablet, Take 1,000 mg by mouth 2 (Two) Times a Day With Meals., Disp: , Rfl:   •  multivitamin with minerals tablet tablet, Take 1 tablet by mouth Daily., Disp: , Rfl:   •  ramipril (ALTACE) 5 MG capsule, Take 1 capsule by mouth Daily., Disp: 90 capsule, Rfl: 3  •  raNITIdine (ZANTAC) 300 MG tablet, Take 300 mg by mouth 2 (Two) Times a Day., Disp: , Rfl:   •  simvastatin (ZOCOR) 40 MG tablet, Take 40 mg by mouth Every Night., Disp: , Rfl:   •  tamsulosin (FLOMAX) 0.4 MG capsule 24 hr capsule, Take 1 capsule by mouth Daily., Disp: , Rfl:   •  traMADol-acetaminophen (ULTRACET) 37.5-325 MG per tablet, Take 1 tablet by mouth 2 (Two) Times a Day As Needed for Moderate Pain ., Disp: , Rfl:   •  metoprolol succinate XL (TOPROL-XL) 100 MG 24 hr tablet, Take 1 tablet by mouth Daily., Disp: 90 tablet, Rfl: 3    Review of Systems   Constitutional: Negative for chills and fever.   Cardiovascular: Negative for  "chest pain and paroxysmal nocturnal dyspnea.   Respiratory: Negative for cough and shortness of breath.    Skin: Negative for rash.   Gastrointestinal: Positive for constipation. Negative for abdominal pain and heartburn.   Neurological: Negative for dizziness and numbness.       Objective   /72 (BP Location: Left arm, Patient Position: Sitting, Cuff Size: Adult)   Pulse 88   Ht 175.3 cm (69.02\")   Wt 83.9 kg (185 lb)   SpO2 98%   BMI 27.31 kg/m²   Constitutional:       Appearance: Well-developed and overweight.   HENT:      Head: Normocephalic and atraumatic.   Pulmonary:      Effort: Pulmonary effort is normal.      Breath sounds: Normal breath sounds.   Cardiovascular:      Normal rate. Regular rhythm.      Murmurs: There is no murmur.      No gallop.   Edema:     Peripheral edema absent.   Skin:     General: Skin is warm and dry.   Neurological:      Mental Status: Alert and oriented to person, place, and time.       Procedures      ICD-10-CM ICD-9-CM   1. Chronic systolic congestive heart failure (CMS/[facility])  I50.22 428.22     428.0   2. Coronary artery disease involving coronary bypass graft of native heart without angina pectoris  I25.810 414.05   3. NSTEMI (non-ST elevated myocardial infarction) (CMS/[facility])  I21.4 410.70   4. Type 2 diabetes mellitus without complication, without long-term current use of insulin (CMS/[facility])  E11.9 250.00   5. Stenosis of left carotid artery  I65.22 433.10   6. Essential hypertension  I10 401.9   7. Hyperlipidemia, unspecified hyperlipidemia type  E78.5 272.4         Assessment/Plan:  1.  Chronic systolic congestive heart failure: Felt to be nonischemic cardiomyopathy.  EF on echo during admission in January was 36-40%.  He appears euvolemic and stable on exam.  Change metoprolol to succinate.  And titrate up to 100 mg continue ramipril.     2.  Coronary artery disease: Status post four-vessel CABG on 1/18/2021.  He denies any current chest pain.    3.  Non-STEMI: During " admission in January.  Status post revascularization with CABG.  He declined cardiac rehab.    4.  Diabetes mellitus type 2: Reasonably good control with hemoglobin A1c of 7.1% during admission.    5.  Carotid stenosis: Moderate 50 to 69% left ICA stenosis.  Continue simvastatin and Plavix.    6.  Essential hypertension: Blood pressure is well controlled today.  Continue current therapy.    7.  Mixed hyperlipidemia: Acceptable control during admission.  Continue simvastatin.  [facility]  Patient will bring copy of this to next visit.

## 2021-03-23 ENCOUNTER — OFFICE VISIT (OUTPATIENT)
Dept: CARDIAC SURGERY | Facility: CLINIC | Age: 76
End: 2021-03-23

## 2021-03-23 VITALS
SYSTOLIC BLOOD PRESSURE: 112 MMHG | HEART RATE: 78 BPM | OXYGEN SATURATION: 98 % | HEIGHT: 69 IN | WEIGHT: 196.6 LBS | BODY MASS INDEX: 29.12 KG/M2 | DIASTOLIC BLOOD PRESSURE: 64 MMHG

## 2021-03-23 DIAGNOSIS — I21.4 NSTEMI (NON-ST ELEVATED MYOCARDIAL INFARCTION) (HCC): Primary | ICD-10-CM

## 2021-03-23 DIAGNOSIS — E11.9 TYPE 2 DIABETES MELLITUS WITHOUT COMPLICATION, WITHOUT LONG-TERM CURRENT USE OF INSULIN (HCC): ICD-10-CM

## 2021-03-23 DIAGNOSIS — I25.810 CORONARY ARTERY DISEASE INVOLVING CORONARY BYPASS GRAFT OF NATIVE HEART WITHOUT ANGINA PECTORIS: ICD-10-CM

## 2021-03-23 DIAGNOSIS — I65.22 STENOSIS OF LEFT CAROTID ARTERY: ICD-10-CM

## 2021-03-23 DIAGNOSIS — I50.22 CHRONIC SYSTOLIC CONGESTIVE HEART FAILURE (HCC): ICD-10-CM

## 2021-03-23 DIAGNOSIS — E66.3 OVERWEIGHT (BMI 25.0-29.9): ICD-10-CM

## 2021-03-23 PROCEDURE — 99024 POSTOP FOLLOW-UP VISIT: CPT | Performed by: NURSE PRACTITIONER

## 2021-03-23 NOTE — PROGRESS NOTES
Subjective   Chief Complaint   Patient presents with   • Post-op Follow-up     CABG x4 on 1/18     Patient ID: Wesley Eduardo is a 75 y.o. male who is here for follow-up having had Coronary artery bypass grafting-4 vessel (left internal mammary artery/left anterior descending, reverse saphenous vein graft/first obtuse marginal, reverse saphenous vein graft/ramus intermedius, and reverse saphenous vein graft/Posterior descending artery), Left endoscopic vein harvest, Right open vein harvest performed on 1/18/2021 by Dr. Wood.     History of Present Illness  Post operative recovery was uneventful without any major complications. He returns today for 2 month follow up. Indicates he is doing well. He declined cardiac rehab but states he is walking up and down hills without chest pain or shortness of breath. Says this is the best he has felt in a long time. No fevers/sweats/chills. No drainage from incisions. No sternal clicks. Good appetite. He does have constipation but is trying to drink more water and increase fiber intake. Will take OTC stool softener and laxative as needed. He has no pain and is not taking pain medications. He is joined by his wife. Saw Dr. Hong a couple weeks ago. Sees [facility] vascular surgery for carotid stenosis in June.     The following portions of the patient's history were reviewed and updated as appropriate: allergies, current medications, past family history, past medical history, past social history, past surgical history and problem list.    Review of Systems   Constitutional: Negative for chills, diaphoresis, fatigue and fever.   Respiratory: Negative for cough and shortness of breath.    Cardiovascular: Negative for chest pain, palpitations and leg swelling.   Gastrointestinal: Positive for constipation. Negative for diarrhea, nausea and vomiting.       Objective   Visit Vitals  /64 (BP Location: Left arm, Patient Position: Sitting, Cuff Size: Adult)   Pulse 78   Ht 175.3 cm  "(69.02\")   Wt 89.2 kg (196 lb 9.6 oz)   SpO2 98%   BMI 29.02 kg/m²       Physical Exam  Vitals reviewed.   Constitutional:       General: He is not in acute distress.     Appearance: Normal appearance. He is well-developed. He is not diaphoretic.   HENT:      Head: Normocephalic and atraumatic.   Eyes:      Pupils: Pupils are equal, round, and reactive to light.   Cardiovascular:      Rate and Rhythm: Normal rate and regular rhythm.      Heart sounds: Normal heart sounds. No murmur heard.   No friction rub.   Pulmonary:      Effort: Pulmonary effort is normal. No respiratory distress.      Breath sounds: Normal breath sounds. No wheezing or rales.   Abdominal:      General: There is no distension.      Palpations: Abdomen is soft.      Tenderness: There is no abdominal tenderness. There is no guarding.   Musculoskeletal:      Cervical back: Normal range of motion and neck supple.      Right lower leg: No edema.      Left lower leg: No edema.   Skin:     General: Skin is warm and dry.      Coloration: Skin is not pale.      Findings: No rash.      Comments: Sternotomy site clean, dry, and intact. Sternum stable. No clicks. Bilateral saphenectomy sites clean, dry, and intact.    Neurological:      General: No focal deficit present.      Mental Status: He is alert and oriented to person, place, and time. Mental status is at baseline.   Psychiatric:         Mood and Affect: Mood normal.         Behavior: Behavior normal.           Assessment/Plan         Diagnoses and all orders for this visit:    1. NSTEMI (non-ST elevated myocardial infarction) (CMS/HCC) (Primary)    2. Chronic systolic congestive heart failure (CMS/HCC)    3. Coronary artery disease involving coronary bypass graft of native heart without angina pectoris    4. Stenosis of left carotid artery    5. Type 2 diabetes mellitus without complication, without long-term current use of insulin (CMS/HCC)    6. Overweight (BMI 25.0-29.9)           Overall, Wesley" CHANDA Eduardo is doing well.  Surgical sites are clean and dry without evidence of infection. He has been previously educated on sternotomy precaution advancement and is up to 20 pounds weight restriction now.  He declined cardiac rehab but is walking/ambualting well. Following post op cardiac surgery home instructions. Provided support and encouragement. All questions have been answered to the best of my ability. Encouraged to keep follow up with cardiology and vascular surgery. Although I have not provided a return visit, please do not hesitate to notify our office of concerns or questions.          Patient's Body mass index is 29.02 kg/m². BMI is above normal parameters. Recommendations include: educational material and referral to primary care.    Wesley Eduardo is a non-smoker and therefore does not need tobacco cessation education/counseling.    Wesley Eduardo  reports that he has never smoked. He has never used smokeless tobacco.      Advance Care Planning   ACP discussion was declined by the patient. Patient does not have an advance directive, declines further assistance.

## 2021-04-27 RX ORDER — RAMIPRIL 5 MG/1
5 CAPSULE ORAL
Qty: 90 CAPSULE | Refills: 3 | Status: SHIPPED | OUTPATIENT
Start: 2021-04-27 | End: 2021-06-11 | Stop reason: SDUPTHER

## 2021-04-27 RX ORDER — METOPROLOL SUCCINATE 100 MG/1
100 TABLET, EXTENDED RELEASE ORAL DAILY
Qty: 90 TABLET | Refills: 3 | Status: SHIPPED | OUTPATIENT
Start: 2021-04-27

## 2021-04-27 NOTE — PROGRESS NOTES
Beata Medina is a 69 y.o. male evaluated via telephone on 6/4/2020. Consent:  He and/or health care decision maker is aware that that he may receive a bill for this telephone service, depending on his insurance coverage, and has provided verbal consent to proceed: Yes      Documentation:  I communicated with the patient and/or health care decision maker about due to the covid-19 outbreak, we are trying to limit exposures to our patients whom have elective follow ups. We are calling each individual patient to make sure they are doing okay. Beata Medina is a 69 y.o. 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  ezetimibe-simvastatin (VYTORIN) 10-40 MG per tablet Take 1 tablet by mouth nightly.  gabapentin (NEURONTIN) 600 MG tablet Take 600 mg by mouth 4 times daily.  tramadol-acetaminophen (ULTRACET) 37.5-325 MG per tablet Take 1 tablet by mouth every 6 hours as needed.  metformin (GLUCOPHAGE) 1000 MG tablet Take 1,000 mg by mouth 2 times daily (with meals).  esomeprazole (NEXIUM) 40 MG capsule Take 40 mg by mouth every morning (before breakfast).  amitriptyline (ELAVIL) 100 MG tablet Take 100 mg by mouth nightly.  aspirin 81 MG EC tablet Take 81 mg by mouth daily.         clopidogrel (PLAVIX) 75 MG tablet Take 75 mg by mouth daily.  magnesium oxide (MAG-OX) 400 MG tablet Take 400 mg by mouth daily. No current facility-administered medications for this visit. Allergies: Quinine derivatives and Sulfur  Past Medical History:   Diagnosis Date    Carotid artery stenosis 2012    Chronic back pain     Diabetes mellitus ([facility] [address] 2013    Diabetic vasculopathy ([facility] [address] 2013    GERD (gastroesophageal reflux disease)     Hyperlipidemia     Hypertension      Past Surgical History:   Procedure Laterality Date    COLONOSCOPY      EYE SURGERY      both eyes    HEMORRHOID SURGERY       Family History   Problem Relation Age of Onset    High Blood Pressure Mother     Heart Attack Mother          of MI     Social History     Tobacco Use    Smoking status: Former Smoker     Years: 20.00     Types: Cigarettes     Last attempt to quit: 1983     Years since quittin.5    Smokeless tobacco: Never Used   Substance Use Topics    Alcohol use: No       Details of this discussion including any medical advice provided: MPH, medications and allergies reviewed. Mr. Mable Bush is a 68 yo male who has a history of carotid artery stenosis. He is taking ASA, Plavix and a statin daily. He reports that he is tolerating the medication without any problems. Patient denies any new onset of partial or complete loss of vision affecting only one eye, speech difficulty or lateralizing weakness, numbness/tingling. Doppler results:    Right CCA/ICA <50% stenotic  Left CCA/ICA 50-69% stenotic  Right verterbral artery flow is antegrade  Left verterbral artery flow is antegrade  Individual velocities reviewed: Yes. Results were reviewed with the patient. Disease process is stable      Assessment/Plan -      1. Carotid artery stenosis      Recommend he continue Plavix, ASA and statin daily  Recommend no smoking    Follow up in  6 months with a repeat CDU.  Patient was instructed to go to ER or call office immediately with any new onset of partial or complete loss of vision affecting only one eye, speech difficulty or lateralizing weakness, numbness/tingling      I affirm this is a Patient Initiated Episode with a Patient who has not had a related appointment within my department in the past 7 days or scheduled within the next 24 hours.       Patient identification was verified at the start of the visit: Yes      Total Time: minutes: 5-10 minutes    Note: not billable if this call serves to triage the patient into an appointment for the relevant concern      Bailey Valadez no...

## 2021-06-11 ENCOUNTER — OFFICE VISIT (OUTPATIENT)
Dept: CARDIOLOGY | Facility: CLINIC | Age: 76
End: 2021-06-11

## 2021-06-11 VITALS
DIASTOLIC BLOOD PRESSURE: 56 MMHG | BODY MASS INDEX: 27.35 KG/M2 | HEART RATE: 62 BPM | SYSTOLIC BLOOD PRESSURE: 130 MMHG | OXYGEN SATURATION: 100 % | WEIGHT: 191 LBS | HEIGHT: 70 IN

## 2021-06-11 DIAGNOSIS — I25.810 CORONARY ARTERY DISEASE INVOLVING CORONARY BYPASS GRAFT OF NATIVE HEART WITHOUT ANGINA PECTORIS: ICD-10-CM

## 2021-06-11 DIAGNOSIS — I10 ESSENTIAL HYPERTENSION: ICD-10-CM

## 2021-06-11 DIAGNOSIS — E78.2 MIXED HYPERLIPIDEMIA: ICD-10-CM

## 2021-06-11 DIAGNOSIS — I65.22 STENOSIS OF LEFT CAROTID ARTERY: ICD-10-CM

## 2021-06-11 DIAGNOSIS — I50.22 CHRONIC SYSTOLIC CONGESTIVE HEART FAILURE (HCC): Primary | ICD-10-CM

## 2021-06-11 DIAGNOSIS — E11.9 TYPE 2 DIABETES MELLITUS WITHOUT COMPLICATION, WITHOUT LONG-TERM CURRENT USE OF INSULIN (HCC): ICD-10-CM

## 2021-06-11 PROCEDURE — 99214 OFFICE O/P EST MOD 30 MIN: CPT | Performed by: INTERNAL MEDICINE

## 2021-06-11 PROCEDURE — 93000 ELECTROCARDIOGRAM COMPLETE: CPT | Performed by: INTERNAL MEDICINE

## 2021-06-11 RX ORDER — RAMIPRIL 10 MG/1
10 CAPSULE ORAL
Qty: 90 CAPSULE | Refills: 3 | Status: SHIPPED | OUTPATIENT
Start: 2021-06-11 | End: 2022-08-16

## 2021-06-11 NOTE — PROGRESS NOTES
Reason for Visit: cardiovascular follow up.    HPI:  Wesley Eduardo is a 75 y.o. male is here today for follow-up.  He has been feeling well and not having any issues or complaints.  This is the best he has felt in some time.  He is active and mows grass on a regular basis and also takes care of horses.  He denies any chest pain, palpitations, dizziness, syncope, PND, or orthopnea.  His blood pressure has been well controlled.    Previous Cardiac Testing and Procedures:  -CTA chest (1/8/2021) no evidence of pulmonary emboli, pleural effusions, central groundglass opacities, interlobular septal thickening most concerning for fluid overload  -Echo (1/8/2020) EF 36-40%, multiple regional wall motion abnormalities, grade 1 diastolic dysfunction, normal RV size and function, no significant valve dysfunction  -Hemoglobin A1c (1/9/2021) 7.1%  -Lipid panel (1/9/2021) total cholesterol 151, HDL 38, LDL 82, triglycerides 179  -LHC (1/11/2021) severe three-vessel CAD, mildly elevated left heart filling pressures  -Carotid ultrasound (1/12/2021) 50-69% left ICA, < 50% right ICA  -Echo (1/22/2021) EF 36-40%, regional wall motion abnormalities  -4-vessel CABG (1/18/2021) LIMA to LAD, SVG to OM, SVG to intermedius, SVG to PDA  -BMP (1/27/2021) creatinine 0.88, BUN 16, potassium 4.3, sodium 134  -Hemoglobin A1c (4/21/2021) 6.9%  -BMP (4/21/2021) creatinine 0.9, BUN 11, potassium 4.2, sodium 138    Patient Active Problem List   Diagnosis   • NSTEMI (non-ST elevated myocardial infarction) (CMS/[facility])   • Type 2 diabetes mellitus without complication, without long-term current use of insulin (CMS/[facility])   • Essential hypertension   • Mixed hyperlipidemia   • Chronic systolic congestive heart failure (CMS/[facility])   • 3-vessel coronary artery disease   • Hyponatremia   • Aspirin-like platelet function defect (CMS/[facility])   • Stenosis of left carotid artery   • Coronary artery disease involving coronary bypass graft of native heart without angina  pectoris       Social History     Tobacco Use   • Smoking status: Never Smoker   • Smokeless tobacco: Never Used   Vaping Use   • Vaping Use: Never used   Substance Use Topics   • Alcohol use: Never   • Drug use: Never       Family History   Problem Relation Age of Onset   • Heart attack Father        The following portions of the patient's history were reviewed and updated as appropriate: allergies, current medications, past family history, past medical history, past social history, past surgical history and problem list.      Current Outpatient Medications:   •  amitriptyline (ELAVIL) 100 MG tablet, Take 100 mg by mouth Every Night., Disp: , Rfl:   •  amLODIPine (NORVASC) 5 MG tablet, Take 5 mg by mouth Daily., Disp: , Rfl:   •  aspirin 81 MG EC tablet, Take 81 mg by mouth Daily., Disp: , Rfl:   •  clopidogrel (PLAVIX) 75 MG tablet, Take 75 mg by mouth Daily., Disp: , Rfl:   •  gabapentin (NEURONTIN) 600 MG tablet, Take 600 mg by mouth 4 (Four) Times a Day., Disp: , Rfl:   •  metFORMIN (GLUCOPHAGE) 1000 MG tablet, Take 1,000 mg by mouth 2 (Two) Times a Day With Meals., Disp: , Rfl:   •  metoprolol succinate XL (TOPROL-XL) 100 MG 24 hr tablet, Take 1 tablet by mouth Daily., Disp: 90 tablet, Rfl: 3  •  multivitamin with minerals tablet tablet, Take 1 tablet by mouth Daily., Disp: , Rfl:   •  ramipril (ALTACE) 10 MG capsule, Take 1 capsule by mouth Daily., Disp: 90 capsule, Rfl: 3  •  simvastatin (ZOCOR) 40 MG tablet, Take 40 mg by mouth Every Night., Disp: , Rfl:   •  tamsulosin (FLOMAX) 0.4 MG capsule 24 hr capsule, Take 1 capsule by mouth Daily., Disp: , Rfl:   •  traMADol-acetaminophen (ULTRACET) 37.5-325 MG per tablet, Take 1 tablet by mouth 2 (Two) Times a Day As Needed for Moderate Pain ., Disp: , Rfl:     Review of Systems   Constitutional: Negative for chills and fever.   Cardiovascular: Negative for chest pain, irregular heartbeat and paroxysmal nocturnal dyspnea.   Respiratory: Negative for cough and  "shortness of breath.    Skin: Negative for rash.   Gastrointestinal: Negative for abdominal pain and heartburn.   Neurological: Negative for dizziness and numbness.       Objective   /56   Pulse 62   Ht 177.8 cm (70\")   Wt 86.6 kg (191 lb)   SpO2 100%   BMI 27.41 kg/m²   Constitutional:       Appearance: Well-developed and overweight.   HENT:      Head: Normocephalic and atraumatic.   Pulmonary:      Effort: Pulmonary effort is normal.      Breath sounds: Normal breath sounds.   Cardiovascular:      Normal rate. Regular rhythm.      Murmurs: There is no murmur.      No gallop. No click.   Edema:     Peripheral edema absent.   Skin:     General: Skin is warm and dry.   Neurological:      Mental Status: Alert and oriented to person, place, and time.         ECG 12 Lead    Date/Time: 6/11/2021 1:14 PM  Performed by: Cade Hong MD  Authorized by: Cade Hong MD   Comparison: compared with previous ECG from 1/20/2021  Similar to previous ECG  Rhythm: sinus rhythm  Rate: normal    Clinical impression: normal ECG              ICD-10-CM ICD-9-CM   1. Chronic systolic congestive heart failure (CMS/[facility])  I50.22 428.22     428.0   2. Coronary artery disease involving coronary bypass graft of native heart without angina pectoris  I25.810 414.05   3. Essential hypertension  I10 401.9   4. Type 2 diabetes mellitus without complication, without long-term current use of insulin (CMS/HCC)  E11.9 250.00   5. Stenosis of left carotid artery  I65.22 433.10   6. Mixed hyperlipidemia  E78.2 272.2         Assessment/Plan:  1.  Chronic systolic congestive heart failure: Ischemic cardiomyopathy.  EF 36-40% on echo during admission from 1/2021.    He remains euvolemic and stable.  Symptoms are NYHA class I-II.    Continue metoprolol succinate and titrate up ramipril to 10 mg.  BMP from 4/21/2021 reviewed and shows normal renal function and electrolytes.     2.  Coronary artery disease: History of 4-vessel CABG on " 1/18/2021.    He remains chest pain-free.  Continue aspirin, Plavix, metoprolol and simvastatin.     3.  Essential hypertension: Blood pressure is normal today.  Will discontinue indapamide and titrate up ramipril due to better heart failure benefits.      4.  Diabetes mellitus type 2: Good control with hemoglobin A1c of 6.9%.  Continue Metformin.    5.  Carotid stenosis: Moderate 50-69% left ICA stenosis.  Continue simvastatin and Plavix.    6. Mixed hyperlipidemia: Acceptable control during admission.  Continue simvastatin.  [facility] and he has a repeat lipid panel pending in October.  Patient will bring labs with him to follow-up.

## 2021-12-13 ENCOUNTER — OFFICE VISIT (OUTPATIENT)
Dept: CARDIOLOGY | Facility: CLINIC | Age: 76
End: 2021-12-13

## 2021-12-13 VITALS
SYSTOLIC BLOOD PRESSURE: 110 MMHG | OXYGEN SATURATION: 98 % | DIASTOLIC BLOOD PRESSURE: 58 MMHG | BODY MASS INDEX: 27.63 KG/M2 | WEIGHT: 193 LBS | HEART RATE: 62 BPM | HEIGHT: 70 IN

## 2021-12-13 DIAGNOSIS — I65.22 STENOSIS OF LEFT CAROTID ARTERY: ICD-10-CM

## 2021-12-13 DIAGNOSIS — I25.810 CORONARY ARTERY DISEASE INVOLVING CORONARY BYPASS GRAFT OF NATIVE HEART WITHOUT ANGINA PECTORIS: ICD-10-CM

## 2021-12-13 DIAGNOSIS — E11.9 TYPE 2 DIABETES MELLITUS WITHOUT COMPLICATION, WITHOUT LONG-TERM CURRENT USE OF INSULIN (HCC): ICD-10-CM

## 2021-12-13 DIAGNOSIS — E78.2 MIXED HYPERLIPIDEMIA: ICD-10-CM

## 2021-12-13 DIAGNOSIS — I50.22 CHRONIC SYSTOLIC CONGESTIVE HEART FAILURE (HCC): Primary | ICD-10-CM

## 2021-12-13 DIAGNOSIS — I10 ESSENTIAL HYPERTENSION: ICD-10-CM

## 2021-12-13 PROBLEM — I21.4 NSTEMI (NON-ST ELEVATED MYOCARDIAL INFARCTION): Status: RESOLVED | Noted: 2021-01-08 | Resolved: 2021-12-13

## 2021-12-13 PROCEDURE — 99214 OFFICE O/P EST MOD 30 MIN: CPT | Performed by: INTERNAL MEDICINE

## 2021-12-13 NOTE — PROGRESS NOTES
Reason for Visit: cardiovascular follow up.    HPI:  Wesley Eduardo is a 76 y.o. male is here today for 6 month follow-up.  He has been doing well and not having any recent issues or complaints.  He denies any chest pain, palpitations, dizziness, syncope, PND, or orthopnea.  He is active but does not get much formal exercise.  He notes his diet has room for improvement.  [facility] showed uncontrolled.    Previous Cardiac Testing and Procedures:  -CTA chest (1/8/2021) no evidence of pulmonary emboli, pleural effusions, central groundglass opacities, interlobular septal thickening most concerning for fluid overload  -Echo (1/8/2020) EF 36-40%, multiple regional wall motion abnormalities, grade 1 diastolic dysfunction, normal RV size and function, no significant valve dysfunction  -Hemoglobin A1c (1/9/2021) 7.1%  -Lipid panel (1/9/2021) total cholesterol 151, HDL 38, LDL 82, triglycerides 179  -LHC (1/11/2021) severe three-vessel CAD, mildly elevated left heart filling pressures  -Carotid ultrasound (1/12/2021) 50-69% left ICA, < 50% right ICA  -Echo (1/22/2021) EF 36-40%, regional wall motion abnormalities  -4-vessel CABG (1/18/2021) LIMA to LAD, SVG to OM, SVG to intermedius, SVG to PDA  -BMP (10/19/2021) creatinine 0.9, BUN 12, potassium 4.6, sodium 137  -Lipid panel (10/19/2021) total cholesterol 153, HDL 34, LDL 79, triglycerides 201    Patient Active Problem List   Diagnosis   • Type 2 diabetes mellitus without complication, without long-term current use of insulin (HCC)   • Essential hypertension   • Mixed hyperlipidemia   • Chronic systolic congestive heart failure (HCC)   • 3-vessel coronary artery disease   • Hyponatremia   • Aspirin-like platelet function defect (HCC)   • Stenosis of left carotid artery   • Coronary artery disease involving coronary bypass graft of native heart without angina pectoris       Social History     Tobacco Use   • Smoking status: Never Smoker   •  Smokeless tobacco: Never Used   Vaping Use   • Vaping Use: Never used   Substance Use Topics   • Alcohol use: Never   • Drug use: Never       Family History   Problem Relation Age of Onset   • Heart attack Father        The following portions of the patient's history were reviewed and updated as appropriate: allergies, current medications, past family history, past medical history, past social history, past surgical history and problem list.      Current Outpatient Medications:   •  amitriptyline (ELAVIL) 100 MG tablet, Take 100 mg by mouth Every Night., Disp: , Rfl:   •  amLODIPine (NORVASC) 5 MG tablet, Take 5 mg by mouth Daily., Disp: , Rfl:   •  aspirin 81 MG EC tablet, Take 81 mg by mouth Daily., Disp: , Rfl:   •  clopidogrel (PLAVIX) 75 MG tablet, Take 75 mg by mouth Daily., Disp: , Rfl:   •  gabapentin (NEURONTIN) 600 MG tablet, Take 600 mg by mouth 4 (Four) Times a Day., Disp: , Rfl:   •  metFORMIN (GLUCOPHAGE) 1000 MG tablet, Take 1,000 mg by mouth 2 (Two) Times a Day With Meals., Disp: , Rfl:   •  metoprolol succinate XL (TOPROL-XL) 100 MG 24 hr tablet, Take 1 tablet by mouth Daily., Disp: 90 tablet, Rfl: 3  •  multivitamin with minerals tablet tablet, Take 1 tablet by mouth Daily., Disp: , Rfl:   •  ramipril (ALTACE) 10 MG capsule, Take 1 capsule by mouth Daily., Disp: 90 capsule, Rfl: 3  •  simvastatin (ZOCOR) 40 MG tablet, Take 40 mg by mouth Every Night., Disp: , Rfl:   •  tamsulosin (FLOMAX) 0.4 MG capsule 24 hr capsule, Take 1 capsule by mouth Daily., Disp: , Rfl:   •  traMADol-acetaminophen (ULTRACET) 37.5-325 MG per tablet, Take 1 tablet by mouth 2 (Two) Times a Day As Needed for Moderate Pain ., Disp: , Rfl:     Review of Systems   Constitutional: Negative for chills and fever.   Cardiovascular: Negative for chest pain, irregular heartbeat, palpitations and paroxysmal nocturnal dyspnea.   Respiratory: Negative for cough and shortness of breath.    Skin: Negative for rash.   Gastrointestinal:  "Negative for abdominal pain and heartburn.   Neurological: Negative for dizziness and numbness.       Objective   /58 (BP Location: Right arm, Patient Position: Sitting, Cuff Size: Adult)   Pulse 62   Ht 177.8 cm (70\")   Wt 87.5 kg (193 lb)   SpO2 98%   BMI 27.69 kg/m²   Constitutional:       Appearance: Well-developed and overweight.   HENT:      Head: Normocephalic and atraumatic.   Pulmonary:      Effort: Pulmonary effort is normal.      Breath sounds: Normal breath sounds.   Cardiovascular:      Normal rate. Regular rhythm.      Murmurs: There is no murmur.      No gallop. No click.   Edema:     Peripheral edema absent.   Skin:     General: Skin is warm and dry.   Neurological:      Mental Status: Alert and oriented to person, place, and time.       Procedures      ICD-10-CM ICD-9-CM   1. Chronic systolic congestive heart failure (HCC)  I50.22 428.22     428.0   2. Coronary artery disease involving coronary bypass graft of native heart without angina pectoris  I25.810 414.05   3. Essential hypertension  I10 401.9   4. Type 2 diabetes mellitus without complication, without long-term current use of insulin (HCC)  E11.9 250.00   5. Stenosis of left carotid artery  I65.22 433.10   6. Mixed hyperlipidemia  E78.2 272.2         Assessment/Plan:  1.  Chronic systolic congestive heart failure: EF 36-40% on echo from 1/22/2021.  Remains euvolemic and stable with NYHA class I functional status.   Continue ramipril and metoprolol succinate.     2.  Coronary artery disease: History of 4-vessel CABG on 1/18/2021.  He remains chest pain-free.  Continue aspirin, Plavix, metoprolol, and simvastatin..     3.  Essential hypertension: Blood pressures well controlled today on her medications.     4.  Diabetes mellitus type 2: Patient reports acceptable control.  Continue medical therapy per PCP.    5.  Carotid stenosis: Moderate 50-69% left ICA stenosis.  Continue simvastatin and Plavix.  Consider repeat carotid " ultrasound in 6 months to a year.    6.   Mixed hyperlipidemia: Borderline control with LDL 79, HDL of 34, and triglycerides of 201 on lipid panel from 10/19/2021.  Discussed options of increasing intensity of statin versus lifestyle modification.  Patient prefers trial of lifestyle modification.  If no improvement 6-month follow-up, will plan to change statin to a higher potency statin.

## 2022-06-09 ENCOUNTER — OFFICE VISIT (OUTPATIENT)
Dept: CARDIOLOGY | Facility: CLINIC | Age: 77
End: 2022-06-09

## 2022-06-09 VITALS
BODY MASS INDEX: 28.06 KG/M2 | HEIGHT: 70 IN | WEIGHT: 196 LBS | SYSTOLIC BLOOD PRESSURE: 143 MMHG | DIASTOLIC BLOOD PRESSURE: 68 MMHG | HEART RATE: 63 BPM

## 2022-06-09 DIAGNOSIS — E11.9 TYPE 2 DIABETES MELLITUS WITHOUT COMPLICATION, WITHOUT LONG-TERM CURRENT USE OF INSULIN: ICD-10-CM

## 2022-06-09 DIAGNOSIS — I50.22 CHRONIC SYSTOLIC CONGESTIVE HEART FAILURE: Primary | ICD-10-CM

## 2022-06-09 DIAGNOSIS — E78.2 MIXED HYPERLIPIDEMIA: ICD-10-CM

## 2022-06-09 DIAGNOSIS — I10 ESSENTIAL HYPERTENSION: ICD-10-CM

## 2022-06-09 DIAGNOSIS — I25.810 CORONARY ARTERY DISEASE INVOLVING CORONARY BYPASS GRAFT OF NATIVE HEART WITHOUT ANGINA PECTORIS: ICD-10-CM

## 2022-06-09 DIAGNOSIS — I65.22 STENOSIS OF LEFT CAROTID ARTERY: ICD-10-CM

## 2022-06-09 PROCEDURE — 99214 OFFICE O/P EST MOD 30 MIN: CPT | Performed by: NURSE PRACTITIONER

## 2022-06-09 PROCEDURE — 93000 ELECTROCARDIOGRAM COMPLETE: CPT | Performed by: NURSE PRACTITIONER

## 2022-06-09 RX ORDER — FAMOTIDINE 20 MG/1
20 TABLET, FILM COATED ORAL 2 TIMES DAILY
COMMUNITY

## 2022-06-09 NOTE — PROGRESS NOTES
Subjective:     Encounter Date: 06/09/2022      Patient ID: Wesley Eduardo is a 76 y.o. male with chronic systolic congestive heart failure, coronary artery disease s/p 4-V CABG in 1/2021, hypertension, type 2 diabetes, hyperlipidemia and carotid artery stenosis.    Chief Complaint: 6 month follow up  Coronary Artery Disease  Presents for follow-up visit. Pertinent negatives include no chest pain, chest pressure, chest tightness, dizziness, leg swelling (mild; intermittent), muscle weakness, palpitations, shortness of breath or weight gain. His past medical history is significant for CHF. The symptoms have been stable. Compliance with diet is good. Compliance with exercise is good. Compliance with medications is good.   Hypertension  This is a chronic problem. The problem is controlled. Pertinent negatives include no chest pain, malaise/fatigue, orthopnea, palpitations, peripheral edema, PND or shortness of breath. Risk factors for coronary artery disease include dyslipidemia, diabetes mellitus and male gender. Current antihypertension treatment includes ACE inhibitors, calcium channel blockers and beta blockers. Hypertensive end-organ damage includes CAD/MI and heart failure.   Congestive Heart Failure  Presents for follow-up visit. Pertinent negatives include no chest pain, chest pressure, edema, fatigue, muscle weakness, near-syncope, orthopnea, palpitations, paroxysmal nocturnal dyspnea or shortness of breath. The symptoms have been stable. His past medical history is significant for CAD. Compliance with total regimen is 51-75%. Compliance with diet is 51-75%. Compliance with exercise is 51-75%. Compliance with medications is 51-75%.     Patient presents today for management of coronary artery disease, congestive heart failure and hypertension. Patient reports that he has been doing well. Patient reports that he has not been having any chest pain. He denies any dyspnea on exertion, fatigue or decreased stamina.  He denies any heart racing or palpitations. He reports that he has been able to be active and mowing yards regularly. He denies checking his blood pressure routinely. He reports some mild leg swelling that happens more on days that he has been mowing. He reports that it resolves on its own. [facility] Patient follows with Dr Land as PCP    Previous Cardiac Testing and Procedures:  -CTA chest (1/8/2021) no evidence of pulmonary emboli, pleural effusions, central groundglass opacities, interlobular septal thickening most concerning for fluid overload  -Echo (1/8/2020) EF 36-40%, multiple regional wall motion abnormalities, grade 1 diastolic dysfunction, normal RV size and function, no significant valve dysfunction  -Hemoglobin A1c (1/9/2021) 7.1%  -Lipid panel (1/9/2021) total cholesterol 151, HDL 38, LDL 82, triglycerides 179  -LHC (1/11/2021) severe three-vessel CAD, mildly elevated left heart filling pressures  -Carotid ultrasound (1/12/2021) 50-69% left ICA, < 50% right ICA  -Echo (1/22/2021) EF 36-40%, regional wall motion abnormalities  -4-vessel CABG (1/18/2021) LIMA to LAD, SVG to OM, SVG to intermedius, SVG to PDA  -BMP (10/19/2021) creatinine 0.9, BUN 12, potassium 4.6, sodium 137  -Lipid panel (10/19/2021) total cholesterol 153, HDL 34, LDL 79, triglycerides 201    The following portions of the patient's history were reviewed and updated as appropriate: allergies, current medications, past family history, past medical history, past social history, past surgical history and problem list.    Allergies   Allergen Reactions   • Sulfa Antibiotics Angioedema   • Quine [Quinine] Unknown - Low Severity       Current Outpatient Medications:   •  amitriptyline (ELAVIL) 100 MG tablet, Take 100 mg by mouth Every Night., Disp: , Rfl:   •  amLODIPine (NORVASC) 5 MG tablet, Take 5 mg by mouth Daily., Disp: , Rfl:   •  aspirin 81 MG EC tablet, Take  81 mg by mouth Daily., Disp: , Rfl:   •  clopidogrel (PLAVIX) 75 MG tablet, Take 75 mg by mouth Daily., Disp: , Rfl:   •  famotidine (PEPCID) 20 MG tablet, Take 20 mg by mouth 2 (Two) Times a Day., Disp: , Rfl:   •  gabapentin (NEURONTIN) 600 MG tablet, Take 600 mg by mouth 4 (Four) Times a Day., Disp: , Rfl:   •  metFORMIN (GLUCOPHAGE) 1000 MG tablet, Take 1,000 mg by mouth 2 (Two) Times a Day With Meals., Disp: , Rfl:   •  metoprolol succinate XL (TOPROL-XL) 100 MG 24 hr tablet, Take 1 tablet by mouth Daily., Disp: 90 tablet, Rfl: 3  •  multivitamin with minerals tablet tablet, Take 1 tablet by mouth Daily., Disp: , Rfl:   •  ramipril (ALTACE) 10 MG capsule, Take 1 capsule by mouth Daily., Disp: 90 capsule, Rfl: 3  •  simvastatin (ZOCOR) 40 MG tablet, Take 40 mg by mouth Every Night., Disp: , Rfl:   •  tamsulosin (FLOMAX) 0.4 MG capsule 24 hr capsule, Take 1 capsule by mouth Daily., Disp: , Rfl:   •  traMADol-acetaminophen (ULTRACET) 37.5-325 MG per tablet, Take 1 tablet by mouth 2 (Two) Times a Day As Needed for Moderate Pain ., Disp: , Rfl:   Past Medical History:   Diagnosis Date   • Diabetes mellitus (HCC)    • Hyperlipidemia    • Hypertension      Social History     Socioeconomic History   • Marital status:    Tobacco Use   • Smoking status: Never Smoker   • Smokeless tobacco: Never Used   Vaping Use   • Vaping Use: Never used   Substance and Sexual Activity   • Alcohol use: Never   • Drug use: Never   • Sexual activity: Defer       Review of Systems   Constitutional: Negative for fatigue, malaise/fatigue and weight gain.   HENT: Negative for nosebleeds.    Cardiovascular: Negative for chest pain, dyspnea on exertion, irregular heartbeat, leg swelling (mild; intermittent), near-syncope, orthopnea, palpitations and paroxysmal nocturnal dyspnea.   Respiratory: Negative for chest tightness and shortness of breath.    Hematologic/Lymphatic: Does not bruise/bleed easily.   Musculoskeletal: Negative for  "muscle weakness.   Genitourinary: Negative for hematuria.   Neurological: Negative for dizziness and weakness.   All other systems reviewed and are negative.         Objective:     Vitals reviewed.   Constitutional:       General: Not in acute distress.     Appearance: Normal appearance. Well-developed.   Eyes:      Pupils: Pupils are equal, round, and reactive to light.   HENT:      Head: Normocephalic and atraumatic.      Nose: Nose normal.   Neck:      Vascular: No carotid bruit.   Pulmonary:      Effort: Pulmonary effort is normal. No respiratory distress.      Breath sounds: Normal breath sounds. No wheezing. No rales.   Cardiovascular:      Normal rate. Regular rhythm.      Murmurs: There is no murmur.   Edema:     Peripheral edema absent.   Abdominal:      General: There is no distension.      Palpations: Abdomen is soft.   Musculoskeletal: Normal range of motion.      Cervical back: Normal range of motion and neck supple. Skin:     General: Skin is warm.      Findings: No erythema or rash.   Neurological:      General: No focal deficit present.      Mental Status: Alert and oriented to person, place, and time.   Psychiatric:         Attention and Perception: Attention normal.         Mood and Affect: Mood normal.         Speech: Speech normal.         Behavior: Behavior normal.         Thought Content: Thought content normal.         Judgment: Judgment normal.         /68   Pulse 63   Ht 177.8 cm (70\")   Wt 88.9 kg (196 lb)   BMI 28.12 kg/m²       ECG 12 Lead    Date/Time: 6/9/2022 2:04 PM  Performed by: Awais Salmeron APRN  Authorized by: Awais Salmeron APRN   Comparison: compared with previous ECG from 6/11/2021  Similar to previous ECG  Rhythm: sinus rhythm  Rate: normal  BPM: 63              Lab Review:   Lipid panel 10/2021:          Results for orders placed during the hospital encounter of 01/07/21    Adult Transthoracic Echo Limited W/ Cont if Necessary Per Protocol    Interpretation " Summary  · Left ventricular systolic function is moderately decreased. Left ventricular ejection fraction appears to be 36-40%.  · The following left ventricular wall segments are hypokinetic: apical inferior, mid inferior, apical septal, mid inferoseptal, mid anteroseptal and basal inferior. The following left ventricular wall segments are akinetic: apex.      I have personally reviewed labs, and past office notes prior to patients visit  Assessment:          Diagnosis Plan   1. Chronic systolic congestive heart failure (HCC)     2. Coronary artery disease involving coronary bypass graft of native heart without angina pectoris     3. Essential hypertension     4. Mixed hyperlipidemia     5. Type 2 diabetes mellitus without complication, without long-term current use of insulin (HCC)     6. Stenosis of left carotid artery  US Carotid Bilateral          Plan:       1. Chronic systolic congestive heart failure: LVEF 36-40% on echo from 1/22/2021. NYHA class I. Patient is euvolemic and stable on examination today. Continue ramipril and metoprolol.     2. CAD: s/p 4-V CABG 1/18/2021. He remains chest pain free. Continue aspirin, plavix, metoprolol and simvastatin.     3. Hypertension: well controlled. Continue current medications    4. Hyperlipidemia: LDL 10/2021 was 79. Continue simvastatin. Will [facility] and discuss with patient.      5. Type 2 DM: A1C 10/2021 was 6.9. managed and followed by PCP    6. Carotid artery disease: Moderate 50-69% left ICA stenosis. Continue simvastatin and Plavix. Carotid US ordered for routine surveillance    Advance Care Planning   ACP discussion was held with the patient during this visit. Patient has an advance directive (not in EMR), copy requested.     Patient is to follow up in 6 months or sooner if needed

## 2022-06-10 ENCOUNTER — HOSPITAL ENCOUNTER (OUTPATIENT)
Dept: ULTRASOUND IMAGING | Facility: HOSPITAL | Age: 77
Discharge: HOME | End: 2022-06-10
Admitting: NURSE PRACTITIONER

## 2022-06-10 DIAGNOSIS — I65.22 STENOSIS OF LEFT CAROTID ARTERY: ICD-10-CM

## 2022-06-10 PROCEDURE — 93880 EXTRACRANIAL BILAT STUDY: CPT | Performed by: SURGERY

## 2022-06-10 PROCEDURE — 93880 EXTRACRANIAL BILAT STUDY: CPT

## 2022-06-14 ENCOUNTER — TELEPHONE (OUTPATIENT)
Dept: CARDIOLOGY | Facility: CLINIC | Age: 77
End: 2022-06-14

## 2022-06-14 NOTE — TELEPHONE ENCOUNTER
----- Message from ANGEL Garcia sent at 6/14/2022  9:17 AM CDT -----  Please inform patient that his US carotid showed less than 50% stenosis on the right and 50-69 % stenosis of the left- this is no change from last US. It did show an area a heavy plaque burden that his PCP may want to further evaluate with a CTA neck.     We need to forward/fax this result to Dr Land at [facility] please.     Thanks

## 2022-08-16 RX ORDER — RAMIPRIL 10 MG/1
CAPSULE ORAL
Qty: 90 CAPSULE | Refills: 3 | Status: SHIPPED | OUTPATIENT
Start: 2022-08-16

## 2022-10-21 ENCOUNTER — TELEPHONE (OUTPATIENT)
Dept: CARDIOLOGY | Facility: CLINIC | Age: 77
End: 2022-10-21

## 2022-10-21 NOTE — TELEPHONE ENCOUNTER
Caller: NATHAN RIOS    Relationship: Emergency Contact    Best call back number: [phone]    What is the best time to reach you: ANYTIME    What was the call regarding: PATIENTS WIFE CALLED IN ABOUT A RFS (REQUEST FOR SERVICE) FORM THAT NEEDS TO BE SENT TO [facility] FOR [facility] TO PAY FOR OFFICE VISIT. SHE STATED THAT COMMUNITY SERVICE ADVISED HER THAT THE CARDIOLOGY OFFICE IS THE ONE THAT TAKES CARE OF THIS.     Do you require a callback: YES

## 2022-12-06 NOTE — PROGRESS NOTES
Subjective:     Encounter Date: 12/09/2022      Patient ID: Wesley Eduardo is a 77 y.o. male with chronic systolic congestive heart failure, coronary artery disease s/p 4-V CABG in 1/2021, hypertension, type 2 diabetes, hyperlipidemia and carotid artery stenosis.    Chief Complaint: 6 month routine follow up  Coronary Artery Disease  Presents for follow-up visit. Pertinent negatives include no chest pain, chest pressure, chest tightness, dizziness, leg swelling, muscle weakness, palpitations, shortness of breath or weight gain. His past medical history is significant for CHF. The symptoms have been stable. Compliance with diet is good. Compliance with exercise is good. Compliance with medications is good.   Hypertension  This is a chronic problem. The problem is controlled. Pertinent negatives include no chest pain, malaise/fatigue, orthopnea, palpitations, peripheral edema, PND or shortness of breath. Risk factors for coronary artery disease include dyslipidemia, diabetes mellitus and male gender. Current antihypertension treatment includes ACE inhibitors, calcium channel blockers and beta blockers. Hypertensive end-organ damage includes CAD/MI and heart failure.   Congestive Heart Failure  Presents for follow-up visit. Pertinent negatives include no chest pain, chest pressure, edema, fatigue, muscle weakness, near-syncope, orthopnea, palpitations, paroxysmal nocturnal dyspnea or shortness of breath. The symptoms have been stable. His past medical history is significant for CAD. Compliance with total regimen is 51-75%. Compliance with diet is 51-75%. Compliance with exercise is 51-75%. Compliance with medications is 51-75%.     Patient presents today for management of coronary artery disease, congestive heart failure and hypertension. Today patient reports that he has been doing well. He reports feeling better than he has in years. He denies having any chest pain. Patient reports that he has been able to remain  very active. He denies any dyspnea on exertion, or decreased stamina. He denies any heart racing or palpitations. He denies any leg swelling, orthopnea or PND. He denies any dizziness or near syncope. He denies monitoring his blood pressure routinely. Patient follows with Dr Land as PCP    Previous Cardiac Testing and Procedures:  -CTA chest (1/8/2021) no evidence of pulmonary emboli, pleural effusions, central groundglass opacities, interlobular septal thickening most concerning for fluid overload  -Echo (1/8/2020) EF 36-40%, multiple regional wall motion abnormalities, grade 1 diastolic dysfunction, normal RV size and function, no significant valve dysfunction  -Hemoglobin A1c (1/9/2021) 7.1%  -Lipid panel (1/9/2021) total cholesterol 151, HDL 38, LDL 82, triglycerides 179  -LHC (1/11/2021) severe three-vessel CAD, mildly elevated left heart filling pressures  -Carotid ultrasound (1/12/2021) 50-69% left ICA, < 50% right ICA  -Echo (1/22/2021) EF 36-40%, regional wall motion abnormalities  -4-vessel CABG (1/18/2021) LIMA to LAD, SVG to OM, SVG to intermedius, SVG to PDA  -BMP (10/19/2021) creatinine 0.9, BUN 12, potassium 4.6, sodium 137  -Lipid panel (10/19/2021) total cholesterol 153, HDL 34, LDL 79, triglycerides 201  -Hemoglobin A1c (10/2022) 6.8%    The following portions of the patient's history were reviewed and updated as appropriate: allergies, current medications, past family history, past medical history, past social history, past surgical history and problem list.    Allergies   Allergen Reactions   • Sulfa Antibiotics Angioedema   • Quine [Quinine] Unknown - Low Severity       Current Outpatient Medications:   •  amitriptyline (ELAVIL) 100 MG tablet, Take 100 mg by mouth Every Night., Disp: , Rfl:   •  amLODIPine (NORVASC) 5 MG tablet, Take 5 mg by mouth Daily., Disp: , Rfl:   •  aspirin 81 MG EC tablet, Take 81 mg by mouth Daily., Disp: , Rfl:   •  clopidogrel (PLAVIX) 75 MG tablet, Take 75 mg by  mouth Daily., Disp: , Rfl:   •  famotidine (PEPCID) 20 MG tablet, Take 20 mg by mouth 2 (Two) Times a Day., Disp: , Rfl:   •  gabapentin (NEURONTIN) 600 MG tablet, Take 600 mg by mouth 4 (Four) Times a Day., Disp: , Rfl:   •  metFORMIN (GLUCOPHAGE) 1000 MG tablet, Take 1,000 mg by mouth 2 (Two) Times a Day With Meals., Disp: , Rfl:   •  metoprolol succinate XL (TOPROL-XL) 100 MG 24 hr tablet, Take 1 tablet by mouth Daily., Disp: 90 tablet, Rfl: 3  •  multivitamin with minerals tablet tablet, Take 1 tablet by mouth Daily., Disp: , Rfl:   •  ramipril (ALTACE) 10 MG capsule, TAKE ONE CAPSULE BY MOUTH ONCE A DAY TO LOWER BLOOD PRESSURE, Disp: 90 capsule, Rfl: 3  •  rosuvastatin (CRESTOR) 20 MG tablet, Take 20 mg by mouth Daily., Disp: , Rfl:   •  tamsulosin (FLOMAX) 0.4 MG capsule 24 hr capsule, Take 1 capsule by mouth Daily., Disp: , Rfl:   •  traMADol-acetaminophen (ULTRACET) 37.5-325 MG per tablet, Take 1 tablet by mouth 2 (Two) Times a Day As Needed for Moderate Pain ., Disp: , Rfl:   Past Medical History:   Diagnosis Date   • Diabetes mellitus (HCC)    • Hyperlipidemia    • Hypertension      Social History     Socioeconomic History   • Marital status:    Tobacco Use   • Smoking status: Never   • Smokeless tobacco: Never   Vaping Use   • Vaping Use: Never used   Substance and Sexual Activity   • Alcohol use: Never   • Drug use: Never   • Sexual activity: Defer       Review of Systems   Constitutional: Negative for fatigue, malaise/fatigue and weight gain.   HENT: Negative for nosebleeds.    Cardiovascular: Negative for chest pain, dyspnea on exertion, irregular heartbeat, leg swelling, near-syncope, orthopnea, palpitations and paroxysmal nocturnal dyspnea.   Respiratory: Negative for chest tightness and shortness of breath.    Hematologic/Lymphatic: Does not bruise/bleed easily.   Musculoskeletal: Negative for muscle weakness.   Genitourinary: Negative for hematuria.   Neurological: Negative for dizziness and  "weakness.   All other systems reviewed and are negative.         Objective:     Vitals reviewed.   Constitutional:       General: Not in acute distress.     Appearance: Normal appearance. Well-developed.   Eyes:      Pupils: Pupils are equal, round, and reactive to light.   HENT:      Head: Normocephalic and atraumatic.      Nose: Nose normal.   Neck:      Vascular: No carotid bruit.   Pulmonary:      Effort: Pulmonary effort is normal. No respiratory distress.      Breath sounds: Normal breath sounds. No wheezing. No rales.   Cardiovascular:      Normal rate. Regular rhythm.      Murmurs: There is no murmur.   Edema:     Peripheral edema absent.   Abdominal:      General: There is no distension.      Palpations: Abdomen is soft.   Musculoskeletal: Normal range of motion.      Cervical back: Normal range of motion and neck supple. Skin:     General: Skin is warm.      Findings: No erythema or rash.   Neurological:      General: No focal deficit present.      Mental Status: Alert and oriented to person, place, and time.   Psychiatric:         Attention and Perception: Attention normal.         Mood and Affect: Mood normal.         Speech: Speech normal.         Behavior: Behavior normal.         Thought Content: Thought content normal.         Judgment: Judgment normal.         BP (P) 130/62   Pulse 56   Ht 177.8 cm (70\")   Wt 91.2 kg (201 lb)   BMI 28.84 kg/m²       ECG 12 Lead    Date/Time: 12/9/2022 9:01 AM  Performed by: Awais Salmeron APRN  Authorized by: Awais Salmeron APRN   Comparison: compared with previous ECG from 6/9/2022  Similar to previous ECG  Rhythm: sinus bradycardia  Rate: bradycardic  BPM: 56              Lab Review:   Lipid panel 10/2021:          Echo 1/8/2022:  Interpretation Summary  • Left ventricular ejection fraction appears to be 36 - 40%. Left ventricular systolic function is moderately decreased.  • The following left ventricular wall segments are hypokinetic: mid anterior, " apical anterior, apical lateral, apical inferior, mid inferior, apical septal, mid inferoseptal, apex hypokinetic, mid anteroseptal and basal inferior.  • Left ventricular diastolic function is consistent with (grade I) impaired relaxation.  • Normal right ventricular cavity size and systolic function noted.  • There is no significant (greater than mild) valvular dysfunction.       I have personally reviewed labs, echo and past office notes prior to patients visit  Assessment:          Diagnosis Plan   1. Chronic systolic congestive heart failure (HCC)        2. Coronary artery disease involving coronary bypass graft of native heart without angina pectoris        3. Essential hypertension        4. Mixed hyperlipidemia        5. Type 2 diabetes mellitus without complication, without long-term current use of insulin (HCC)        6. Stenosis of left carotid artery               Plan:       1. Chronic systolic congestive heart failure: LVEF 36-40% on echo from 1/8/2021. NYHA class I. Patient is euvolemic and stable on examination today. Continue ramipril and metoprolol. Reviewed signs and symptoms of CHF and what to report to office with patient. Patient instructed to restrict sodium and record daily weight. Patient is to report weight gain of greater than 2 lbs overnight or 5 lbs in 1 week. Patient verbalized understanding of instructions and plan of care.     2. CAD: s/p 4-V CABG 1/18/2021. He remains chest pain free. Continue aspirin, plavix, metoprolol and rosuvastatin.     3. Hypertension: well controlled. Continue current medications. Recommend to monitor routinely and notify office if consistently >140/80.     4. Hyperlipidemia: LDL 10/2021 was 79. Patient was changed to rosuvastatin. Followed and managed by PCP      5. Type 2 DM: A1C 10/2022 was 6.8. managed and followed by PCP    6. Carotid artery disease: Moderate 50-69% left ICA stenosis 6/2022. Continue rosuvastatin and Plavix. Continue routine monitoring  with yearly US Carotids.     I attest that all portions of this note reviewed and all information has been updated by myself to reflect the patient's current status.      Patient is to follow up in 6 months or sooner if needed

## 2022-12-09 ENCOUNTER — OFFICE VISIT (OUTPATIENT)
Dept: CARDIOLOGY | Facility: CLINIC | Age: 77
End: 2022-12-09

## 2022-12-09 VITALS — HEART RATE: 56 BPM | WEIGHT: 201 LBS | HEIGHT: 70 IN | BODY MASS INDEX: 28.77 KG/M2

## 2022-12-09 DIAGNOSIS — I25.810 CORONARY ARTERY DISEASE INVOLVING CORONARY BYPASS GRAFT OF NATIVE HEART WITHOUT ANGINA PECTORIS: ICD-10-CM

## 2022-12-09 DIAGNOSIS — I65.22 STENOSIS OF LEFT CAROTID ARTERY: ICD-10-CM

## 2022-12-09 DIAGNOSIS — E11.9 TYPE 2 DIABETES MELLITUS WITHOUT COMPLICATION, WITHOUT LONG-TERM CURRENT USE OF INSULIN: ICD-10-CM

## 2022-12-09 DIAGNOSIS — I10 ESSENTIAL HYPERTENSION: ICD-10-CM

## 2022-12-09 DIAGNOSIS — I50.22 CHRONIC SYSTOLIC CONGESTIVE HEART FAILURE: Primary | ICD-10-CM

## 2022-12-09 DIAGNOSIS — E78.2 MIXED HYPERLIPIDEMIA: ICD-10-CM

## 2022-12-09 PROCEDURE — 99214 OFFICE O/P EST MOD 30 MIN: CPT | Performed by: NURSE PRACTITIONER

## 2022-12-09 PROCEDURE — 93000 ELECTROCARDIOGRAM COMPLETE: CPT | Performed by: NURSE PRACTITIONER

## 2022-12-09 RX ORDER — ROSUVASTATIN CALCIUM 20 MG/1
20 TABLET, COATED ORAL DAILY
COMMUNITY

## 2023-05-09 ENCOUNTER — TELEPHONE (OUTPATIENT)
Dept: CARDIOLOGY | Facility: CLINIC | Age: 78
End: 2023-05-09
Payer: OTHER GOVERNMENT

## 2023-05-09 NOTE — TELEPHONE ENCOUNTER
Caller: NICOLE    Relationship: WITH [facility] [employer] [facility]     Best call back number: [phone] OPTION 4    What form or medical record are you requesting: REQUEST FOR SERVICES FORM WITH LAST OFFICE NOTE    Who is requesting this form or medical record from you: [facility]    How would you like to receive the form or medical records (pick-up, mail, fax): FAX  If fax, what is the fax number: [phone]    Timeframe paperwork needed: ASAP    Additional notes: PATIENT WILL NOT BE COVERED FOR HIS UPCOMING APPOINTMENT IF THIS IS NOT SENT IN ASAP AS THE PATIENT'S AUTHORIZATION EXPIRES ON 06.07.23.

## 2023-08-30 RX ORDER — RAMIPRIL 10 MG/1
CAPSULE ORAL
Qty: 90 CAPSULE | Refills: 3 | Status: SHIPPED | OUTPATIENT
Start: 2023-08-30

## 2023-12-06 ENCOUNTER — TELEPHONE (OUTPATIENT)
Dept: CARDIOLOGY | Facility: CLINIC | Age: 78
End: 2023-12-06
Payer: OTHER GOVERNMENT

## 2023-12-06 NOTE — TELEPHONE ENCOUNTER
Caller: NATHAN RIOS    Relationship: Emergency Contact    Best call back number: [phone] , CAN LEAVE MESSAGE    What is the best time to reach you: ANYTIME    Who are you requesting to speak with (clinical staff, provider,  specific staff member): CLINICAL    Do you know the name of the person who called: NATHAN    What was the call regarding: NATHAN WAS CALLING TO CHECK IF THE PATIENT'S [facility] AUTHORIZATION HAD COME THROUGH FOR 12.29.23 APPT. THE ONE IN THE PATIENT'S CHART EXPIRES TODAY.  SHE STATES WAS TOLD THAT DR. MARTINS'S NURSE TAKES CARE OF THE AUTHORIZATIONS    Is it okay if the provider responds through MyChart: PLEASE CALL

## 2023-12-08 NOTE — TELEPHONE ENCOUNTER
PATIENT'S WIFE STATED THAT THEY NEED THE OFFICE TO REACH OUT TO THE [facility] FOR CONTINUITY OF CARE BECAUSE HIS VA APPROVAL

## 2023-12-29 ENCOUNTER — OFFICE VISIT (OUTPATIENT)
Dept: CARDIOLOGY | Facility: CLINIC | Age: 78
End: 2023-12-29
Payer: OTHER GOVERNMENT

## 2023-12-29 VITALS
SYSTOLIC BLOOD PRESSURE: 110 MMHG | WEIGHT: 192 LBS | BODY MASS INDEX: 27.49 KG/M2 | OXYGEN SATURATION: 98 % | HEART RATE: 57 BPM | HEIGHT: 70 IN | DIASTOLIC BLOOD PRESSURE: 58 MMHG

## 2023-12-29 DIAGNOSIS — I25.810 CORONARY ARTERY DISEASE INVOLVING CORONARY BYPASS GRAFT OF NATIVE HEART WITHOUT ANGINA PECTORIS: ICD-10-CM

## 2023-12-29 DIAGNOSIS — E78.2 MIXED HYPERLIPIDEMIA: ICD-10-CM

## 2023-12-29 DIAGNOSIS — I10 ESSENTIAL HYPERTENSION: ICD-10-CM

## 2023-12-29 DIAGNOSIS — I50.22 CHRONIC SYSTOLIC CONGESTIVE HEART FAILURE: Primary | ICD-10-CM

## 2023-12-29 DIAGNOSIS — I65.22 STENOSIS OF LEFT CAROTID ARTERY: ICD-10-CM

## 2023-12-29 NOTE — PROGRESS NOTES
Reason for Visit: cardiovascular follow up.    HPI:  Wesley Eduardo is a 78 y.o. male is here today for follow-up.  He is doing well for the most part.  He recently had a sharp chest pain that lasted for a split second and did not come back.  He denies any palpitations, dizziness, syncope, PND, or orthopnea.  He is active around the house but does not get much formal exercise.  His blood pressure is well controlled at home when he checks it.  He reports eating a healthy diet indulging around Mode.  He is planning on doing a bear hunt in the near future.    Previous Cardiac Testing and Procedures:  -CTA chest (1/8/2021) no evidence of pulmonary emboli, pleural effusions, central groundglass opacities, interlobular septal thickening most concerning for fluid overload  -Echo (1/8/2020) EF 36-40%, multiple regional wall motion abnormalities, grade 1 diastolic dysfunction, normal RV size and function, no significant valve dysfunction  -LHC (1/11/2021) severe three-vessel CAD, mildly elevated left heart filling pressures  -Carotid ultrasound (1/12/2021) 50-69% left ICA, < 50% right ICA  -Echo (1/22/2021) EF 36-40%, regional wall motion abnormalities  -4-vessel CABG (1/18/2021) LIMA to LAD, SVG to OM, SVG to intermedius, SVG to PDA      Lab data:  -Hemoglobin A1c (1/9/2021) 7.1%  -Lipid panel (1/9/2021) total cholesterol 151, HDL 38, LDL 82, triglycerides 179  -BMP (10/19/2021) creatinine 0.9, BUN 12, potassium 4.6, sodium 137  -Lipid panel (10/19/2021) total cholesterol 153, HDL 34, LDL 79, triglycerides 201  -Lipid panel (4/19/2023) total cholesterol 123, HDL 30, LDL 53, triglycerides 200  -BMP (4/19/2023) creatinine 0.8, potassium 5.2, sodium 135  -Lipid panel (10/17/2023) total cholesterol 133, HDL 33, LDL 56, triglycerides 219    Patient Active Problem List   Diagnosis    Type 2 diabetes mellitus without complication, without long-term current use of insulin    Essential hypertension    Mixed hyperlipidemia     Chronic systolic congestive heart failure    3-vessel coronary artery disease    Hyponatremia    Aspirin-like platelet function defect    Stenosis of left carotid artery    Coronary artery disease involving coronary bypass graft of native heart without angina pectoris       Social History     Tobacco Use    Smoking status: Never    Smokeless tobacco: Never   Vaping Use    Vaping Use: Never used   Substance Use Topics    Alcohol use: Never    Drug use: Never       Family History   Problem Relation Age of Onset    Heart attack Father        The following portions of the patient's history were reviewed and updated as appropriate: allergies, current medications, past family history, past medical history, past social history, past surgical history, and problem list.      Current Outpatient Medications:     amitriptyline (ELAVIL) 100 MG tablet, Take 1 tablet by mouth Every Night., Disp: , Rfl:     amLODIPine (NORVASC) 5 MG tablet, Take 1 tablet by mouth Daily., Disp: , Rfl:     aspirin 81 MG EC tablet, Take 1 tablet by mouth Daily., Disp: , Rfl:     clopidogrel (PLAVIX) 75 MG tablet, Take 1 tablet by mouth Daily., Disp: , Rfl:     famotidine (PEPCID) 20 MG tablet, Take 1 tablet by mouth 2 (Two) Times a Day., Disp: , Rfl:     gabapentin (NEURONTIN) 600 MG tablet, Take 1 tablet by mouth 4 (Four) Times a Day., Disp: , Rfl:     glipizide (GLUCOTROL XL) 5 MG ER tablet, Take 1 tablet by mouth Daily., Disp: , Rfl:     metFORMIN (GLUCOPHAGE) 1000 MG tablet, Take 1 tablet by mouth 2 (Two) Times a Day With Meals., Disp: , Rfl:     metoprolol succinate XL (TOPROL-XL) 100 MG 24 hr tablet, Take 1 tablet by mouth Daily., Disp: 90 tablet, Rfl: 3    ramipril (ALTACE) 10 MG capsule, TAKE ONE CAPSULE BY MOUTH ONCE A DAY TO LOWER BLOOD PRESSURE, Disp: 90 capsule, Rfl: 3    rosuvastatin (CRESTOR) 20 MG tablet, Take 1 tablet by mouth Daily., Disp: , Rfl:     tamsulosin (FLOMAX) 0.4 MG capsule 24 hr capsule, Take 1 capsule by mouth Daily.,  "Disp: , Rfl:     traMADol-acetaminophen (ULTRACET) 37.5-325 MG per tablet, Take 1 tablet by mouth 2 (Two) Times a Day As Needed for Moderate Pain., Disp: , Rfl:     multivitamin with minerals tablet tablet, Take 1 tablet by mouth Daily., Disp: , Rfl:     Review of Systems   Constitutional: Negative for chills and fever.   Cardiovascular:  Positive for chest pain. Negative for paroxysmal nocturnal dyspnea.   Respiratory:  Negative for cough and shortness of breath.    Skin:  Negative for rash.   Gastrointestinal:  Negative for abdominal pain and heartburn.   Neurological:  Negative for dizziness and numbness.       Objective   /58 (BP Location: Left arm, Patient Position: Sitting, Cuff Size: Adult)   Pulse 57   Ht 177.8 cm (70\")   Wt 87.1 kg (192 lb)   SpO2 98%   BMI 27.55 kg/m²   Constitutional:       Appearance: Well-developed.   HENT:      Head: Normocephalic and atraumatic.   Pulmonary:      Effort: Pulmonary effort is normal.      Breath sounds: Normal breath sounds.   Cardiovascular:      Normal rate. Regular rhythm.      Murmurs: There is no murmur.      No gallop.  No click.   Edema:     Peripheral edema absent.   Skin:     General: Skin is warm and dry.   Neurological:      Mental Status: Alert and oriented to person, place, and time.         ECG 12 Lead    Date/Time: 12/29/2023 10:12 AM  Performed by: Cade Hong MD    Authorized by: Cade Hong MD  Comparison: compared with previous ECG from 6/26/2023  Similar to previous ECG  Rhythm: sinus bradycardia  Other findings: non-specific ST-T wave changes            ICD-10-CM ICD-9-CM   1. Chronic systolic congestive heart failure  I50.22 428.22     428.0   2. Coronary artery disease involving coronary bypass graft of native heart without angina pectoris  I25.810 414.05   3. Essential hypertension  I10 401.9   4. Stenosis of left carotid artery  I65.22 433.10   5. Mixed hyperlipidemia  E78.2 272.2         Assessment/Plan:  1.  Chronic " systolic congestive heart failure: Echo from 1/22/2021 with EF of 36 to 40%.  Euvolemic and stable on exam today.  Functional capacity remains NYHA class I.  Continue ramipril and metoprolol succinate.     2.  Coronary artery disease: 4-vessel CABG on 1/18/2021.  Only a very brief episode of chest pain that is consistent with noncardiac etiology.  No anginal symptoms.  Continue aspirin, Plavix, metoprolol, and simvastatin..     3.  Essential hypertension: Blood pressure is well-controlled today.  Continue amlodipine, metoprolol, and ramipril.     4.  Carotid stenosis: Stable at 50 to 69% of the left ICA on carotid ultrasound from 6/10/2022.  Continue Plavix and rosuvastatin.    5.  Mixed hyperlipidemia: Recently good control on lipid panel from 10/17/2023 other than high triglycerides.  Continue rosuvastatin and counseled on lifestyle modification.

## 2023-12-29 NOTE — LETTER
December 29, 2023     Alfredito Land MD  [address] [facility] [address]    Patient: Wesley Eduardo   YOB: 1945   Date of Visit: 12/29/2023       Dear Alfredito Land MD    Wesley Eduardo was in my office today. Below is a copy of my note.    If you have questions, please do not hesitate to call me. I look forward to following Wesley along with you.         Sincerely,        Cade Hong MD        CC: No Recipients      Reason for Visit: cardiovascular follow up.    HPI:  Wesley Eduardo is a 78 y.o. male is here today for follow-up.  He is doing well for the most part.  He recently had a sharp chest pain that lasted for a split second and did not come back.  He denies any palpitations, dizziness, syncope, PND, or orthopnea.  He is active around the house but does not get much formal exercise.  His blood pressure is well controlled at home when he checks it.  He reports eating a healthy diet indulging around [employer].  He is planning on doing a bear hunt in the near future.    Previous Cardiac Testing and Procedures:  -CTA chest (1/8/2021) no evidence of pulmonary emboli, pleural effusions, central groundglass opacities, interlobular septal thickening most concerning for fluid overload  -Echo (1/8/2020) EF 36-40%, multiple regional wall motion abnormalities, grade 1 diastolic dysfunction, normal RV size and function, no significant valve dysfunction  -LHC (1/11/2021) severe three-vessel CAD, mildly elevated left heart filling pressures  -Carotid ultrasound (1/12/2021) 50-69% left ICA, < 50% right ICA  -Echo (1/22/2021) EF 36-40%, regional wall motion abnormalities  -4-vessel CABG (1/18/2021) LIMA to LAD, SVG to OM, SVG to intermedius, SVG to PDA      Lab data:  -Hemoglobin A1c (1/9/2021) 7.1%  -Lipid panel (1/9/2021) total cholesterol 151, HDL 38, LDL 82, triglycerides 179  -BMP (10/19/2021) creatinine 0.9, BUN 12, potassium 4.6, sodium 137  -Lipid panel (10/19/2021) total  cholesterol 153, HDL 34, LDL 79, triglycerides 201  -Lipid panel (4/19/2023) total cholesterol 123, HDL 30, LDL 53, triglycerides 200  -BMP (4/19/2023) creatinine 0.8, potassium 5.2, sodium 135  -Lipid panel (10/17/2023) total cholesterol 133, HDL 33, LDL 56, triglycerides 219    Patient Active Problem List   Diagnosis   • Type 2 diabetes mellitus without complication, without long-term current use of insulin   • Essential hypertension   • Mixed hyperlipidemia   • Chronic systolic congestive heart failure   • 3-vessel coronary artery disease   • Hyponatremia   • Aspirin-like platelet function defect   • Stenosis of left carotid artery   • Coronary artery disease involving coronary bypass graft of native heart without angina pectoris       Social History     Tobacco Use   • Smoking status: Never   • Smokeless tobacco: Never   Vaping Use   • Vaping Use: Never used   Substance Use Topics   • Alcohol use: Never   • Drug use: Never       Family History   Problem Relation Age of Onset   • Heart attack Father        The following portions of the patient's history were reviewed and updated as appropriate: allergies, current medications, past family history, past medical history, past social history, past surgical history, and problem list.      Current Outpatient Medications:   •  amitriptyline (ELAVIL) 100 MG tablet, Take 1 tablet by mouth Every Night., Disp: , Rfl:   •  amLODIPine (NORVASC) 5 MG tablet, Take 1 tablet by mouth Daily., Disp: , Rfl:   •  aspirin 81 MG EC tablet, Take 1 tablet by mouth Daily., Disp: , Rfl:   •  clopidogrel (PLAVIX) 75 MG tablet, Take 1 tablet by mouth Daily., Disp: , Rfl:   •  famotidine (PEPCID) 20 MG tablet, Take 1 tablet by mouth 2 (Two) Times a Day., Disp: , Rfl:   •  gabapentin (NEURONTIN) 600 MG tablet, Take 1 tablet by mouth 4 (Four) Times a Day., Disp: , Rfl:   •  glipizide (GLUCOTROL XL) 5 MG ER tablet, Take 1 tablet by mouth Daily., Disp: , Rfl:   •  metFORMIN (GLUCOPHAGE) 1000 MG  "tablet, Take 1 tablet by mouth 2 (Two) Times a Day With Meals., Disp: , Rfl:   •  metoprolol succinate XL (TOPROL-XL) 100 MG 24 hr tablet, Take 1 tablet by mouth Daily., Disp: 90 tablet, Rfl: 3  •  ramipril (ALTACE) 10 MG capsule, TAKE ONE CAPSULE BY MOUTH ONCE A DAY TO LOWER BLOOD PRESSURE, Disp: 90 capsule, Rfl: 3  •  rosuvastatin (CRESTOR) 20 MG tablet, Take 1 tablet by mouth Daily., Disp: , Rfl:   •  tamsulosin (FLOMAX) 0.4 MG capsule 24 hr capsule, Take 1 capsule by mouth Daily., Disp: , Rfl:   •  traMADol-acetaminophen (ULTRACET) 37.5-325 MG per tablet, Take 1 tablet by mouth 2 (Two) Times a Day As Needed for Moderate Pain., Disp: , Rfl:   •  multivitamin with minerals tablet tablet, Take 1 tablet by mouth Daily., Disp: , Rfl:     Review of Systems   Constitutional: Negative for chills and fever.   Cardiovascular:  Positive for chest pain. Negative for paroxysmal nocturnal dyspnea.   Respiratory:  Negative for cough and shortness of breath.    Skin:  Negative for rash.   Gastrointestinal:  Negative for abdominal pain and heartburn.   Neurological:  Negative for dizziness and numbness.       Objective  /58 (BP Location: Left arm, Patient Position: Sitting, Cuff Size: Adult)   Pulse 57   Ht 177.8 cm (70\")   Wt 87.1 kg (192 lb)   SpO2 98%   BMI 27.55 kg/m²   Constitutional:       Appearance: Well-developed.   HENT:      Head: Normocephalic and atraumatic.   Pulmonary:      Effort: Pulmonary effort is normal.      Breath sounds: Normal breath sounds.   Cardiovascular:      Normal rate. Regular rhythm.      Murmurs: There is no murmur.      No gallop.  No click.   Edema:     Peripheral edema absent.   Skin:     General: Skin is warm and dry.   Neurological:      Mental Status: Alert and oriented to person, place, and time.         ECG 12 Lead    Date/Time: 12/29/2023 10:12 AM  Performed by: Cade Hong MD    Authorized by: Cade Hong MD  Comparison: compared with previous ECG from " 6/26/2023  Similar to previous ECG  Rhythm: sinus bradycardia  Other findings: non-specific ST-T wave changes            ICD-10-CM ICD-9-CM   1. Chronic systolic congestive heart failure  I50.22 428.22     428.0   2. Coronary artery disease involving coronary bypass graft of native heart without angina pectoris  I25.810 414.05   3. Essential hypertension  I10 401.9   4. Stenosis of left carotid artery  I65.22 433.10   5. Mixed hyperlipidemia  E78.2 272.2         Assessment/Plan:  1.  Chronic systolic congestive heart failure: Echo from 1/22/2021 with EF of 36 to 40%.  Euvolemic and stable on exam today.  Functional capacity remains NYHA class I.  Continue ramipril and metoprolol succinate.     2.  Coronary artery disease: 4-vessel CABG on 1/18/2021.  Only a very brief episode of chest pain that is consistent with noncardiac etiology.  No anginal symptoms.  Continue aspirin, Plavix, metoprolol, and simvastatin..     3.  Essential hypertension: Blood pressure is well-controlled today.  Continue amlodipine, metoprolol, and ramipril.     4.  Carotid stenosis: Stable at 50 to 69% of the left ICA on carotid ultrasound from 6/10/2022.  Continue Plavix and rosuvastatin.    5.  Mixed hyperlipidemia: Recently good control on lipid panel from 10/17/2023 other than high triglycerides.  Continue rosuvastatin and counseled on lifestyle modification.

## 2024-07-26 ENCOUNTER — TELEPHONE (OUTPATIENT)
Dept: CARDIOLOGY | Facility: CLINIC | Age: 79
End: 2024-07-26
Payer: OTHER GOVERNMENT

## 2024-07-26 NOTE — TELEPHONE ENCOUNTER
----- Message from Ashlee ALFREDO sent at 7/26/2024 10:35 AM CDT -----  Pt's appt was rsd from 7/26 to [place].  He states he doesn't feel like he should wait 9 mths to see someone.  Patient can be reached @ [phone].

## 2024-07-26 NOTE — TELEPHONE ENCOUNTER
I called and spoke with the pt. She said as they were walking, her  felt pain from upper abdomen the other day. He was concerned about this. He is currently mowing and not having any issue. I have advised the pt wife to monitor the pain in the next few days and they are to call us next week with update. She is also advised that symptoms gets worse, she is to take him to nearest er. She v/u.

## 2024-08-08 RX ORDER — RAMIPRIL 10 MG/1
CAPSULE ORAL
Qty: 90 CAPSULE | Refills: 3 | Status: SHIPPED | OUTPATIENT
Start: 2024-08-08

## 2024-11-15 ENCOUNTER — OFFICE VISIT (OUTPATIENT)
Dept: CARDIOLOGY | Facility: CLINIC | Age: 79
End: 2024-11-15
Payer: OTHER GOVERNMENT

## 2024-11-15 VITALS
BODY MASS INDEX: 27.77 KG/M2 | HEART RATE: 53 BPM | HEIGHT: 70 IN | DIASTOLIC BLOOD PRESSURE: 68 MMHG | WEIGHT: 194 LBS | SYSTOLIC BLOOD PRESSURE: 132 MMHG | OXYGEN SATURATION: 98 %

## 2024-11-15 DIAGNOSIS — I65.22 STENOSIS OF LEFT CAROTID ARTERY: ICD-10-CM

## 2024-11-15 DIAGNOSIS — E11.9 TYPE 2 DIABETES MELLITUS WITHOUT COMPLICATION, WITHOUT LONG-TERM CURRENT USE OF INSULIN: ICD-10-CM

## 2024-11-15 DIAGNOSIS — I25.810 CORONARY ARTERY DISEASE INVOLVING CORONARY BYPASS GRAFT OF NATIVE HEART WITHOUT ANGINA PECTORIS: ICD-10-CM

## 2024-11-15 DIAGNOSIS — I10 PRIMARY HYPERTENSION: ICD-10-CM

## 2024-11-15 DIAGNOSIS — I10 ESSENTIAL HYPERTENSION: ICD-10-CM

## 2024-11-15 DIAGNOSIS — I50.22 CHRONIC SYSTOLIC CONGESTIVE HEART FAILURE: Primary | ICD-10-CM

## 2024-11-15 DIAGNOSIS — E78.2 MIXED HYPERLIPIDEMIA: ICD-10-CM

## 2024-11-15 NOTE — PROGRESS NOTES
Subjective:     Encounter Date:  11/15/2024      Patient ID: Wesley Eduarod is a 79 y.o. male with chronic systolic congestive heart failure, coronary artery disease s/p 4-V CABG in 1/2021, hypertension, type 2 diabetes, hyperlipidemia and carotid artery stenosis.    Chief Complaint: routine follow up  History of Present Illness    Patient presents today for management of coronary artery disease, congestive heart failure and hypertension. Today patient reports that he has been doing well. He has continued to be very active and is able to do what he wants with no difficulty. He denies any chest pain. He denies any dyspnea on exertion, or decreased stamina. He reports no decrease in energy over the past year. He denies any heart racing or palpitations. He denies any leg swelling, orthopnea or PND. He denies any dizziness or near syncope. He denies monitoring his blood pressure routinely but reports that it has remained well controlled at other visits. Patient follows with Dr Land as PCP    Previous Cardiac Testing and Procedures:  -CTA chest (1/8/2021) no evidence of pulmonary emboli, pleural effusions, central groundglass opacities, interlobular septal thickening most concerning for fluid overload  -Echo (1/8/2020) EF 36-40%, multiple regional wall motion abnormalities, grade 1 diastolic dysfunction, normal RV size and function, no significant valve dysfunction  -Hemoglobin A1c (1/9/2021) 7.1%  -Lipid panel (1/9/2021) total cholesterol 151, HDL 38, LDL 82, triglycerides 179  -C (1/11/2021) severe three-vessel CAD, mildly elevated left heart filling pressures  -Carotid ultrasound (1/12/2021) 50-69% left ICA, < 50% right ICA  -Echo (1/22/2021) EF 36-40%, regional wall motion abnormalities  -4-vessel CABG (1/18/2021) LIMA to LAD, SVG to OM, SVG to intermedius, SVG to PDA  -BMP (10/19/2021) creatinine 0.9, BUN 12, potassium 4.6, sodium 137  -Lipid panel (10/19/2021) total cholesterol 153, HDL 34, LDL 79,  triglycerides 201  -Hemoglobin A1c (10/2022) 6.8%  -Lipid panel (4/19/2023) total cholesterol 123, HDL 30, LDL 53, triglycerides 200  -Hemoglobin A1c (4/19/2023) 7.18%  -Lipid panel (10/2024): total cholesterol 131, HDL 33, LDL 57, triglycerides 245  -Hemoglobin A1C (10/2024): 7.2    The following portions of the patient's history were reviewed and updated as appropriate: allergies, current medications, past family history, past medical history, past social history, past surgical history and problem list.    Allergies   Allergen Reactions    Sulfa Antibiotics Angioedema    Quine [Quinine] Unknown - Low Severity       Current Outpatient Medications:     amitriptyline (ELAVIL) 100 MG tablet, Take 1 tablet by mouth Every Night., Disp: , Rfl:     amLODIPine (NORVASC) 5 MG tablet, Take 1 tablet by mouth Daily., Disp: , Rfl:     aspirin 81 MG EC tablet, Take 1 tablet by mouth Daily., Disp: , Rfl:     clopidogrel (PLAVIX) 75 MG tablet, Take 1 tablet by mouth Daily., Disp: , Rfl:     famotidine (PEPCID) 20 MG tablet, Take 1 tablet by mouth 2 (Two) Times a Day., Disp: , Rfl:     gabapentin (NEURONTIN) 600 MG tablet, Take 1 tablet by mouth 4 (Four) Times a Day., Disp: , Rfl:     glipizide (GLUCOTROL XL) 5 MG ER tablet, Take 1 tablet by mouth Daily., Disp: , Rfl:     metFORMIN (GLUCOPHAGE) 1000 MG tablet, Take 1 tablet by mouth 2 (Two) Times a Day With Meals., Disp: , Rfl:     metoprolol succinate XL (TOPROL-XL) 100 MG 24 hr tablet, Take 1 tablet by mouth Daily., Disp: 90 tablet, Rfl: 3    multivitamin with minerals tablet tablet, Take 1 tablet by mouth Daily., Disp: , Rfl:     ramipril (ALTACE) 10 MG capsule, TAKE ONE CAPSULE BY MOUTH ONCE A DAY, Disp: 90 capsule, Rfl: 3    rosuvastatin (CRESTOR) 20 MG tablet, Take 1 tablet by mouth Daily., Disp: , Rfl:     tamsulosin (FLOMAX) 0.4 MG capsule 24 hr capsule, Take 1 capsule by mouth Daily., Disp: , Rfl:     traMADol-acetaminophen (ULTRACET) 37.5-325 MG per tablet, Take 1 tablet  by mouth 2 (Two) Times a Day As Needed for Moderate Pain., Disp: , Rfl:   Past Medical History:   Diagnosis Date    Diabetes mellitus     Hyperlipidemia     Hypertension      Social History     Socioeconomic History    Marital status:    Tobacco Use    Smoking status: Never    Smokeless tobacco: Never   Vaping Use    Vaping status: Never Used   Substance and Sexual Activity    Alcohol use: Never    Drug use: Never    Sexual activity: Defer       Review of Systems   Constitutional: Negative for malaise/fatigue and weight gain.   HENT:  Negative for nosebleeds.    Cardiovascular:  Negative for chest pain, dyspnea on exertion, irregular heartbeat, leg swelling, near-syncope, orthopnea, palpitations and paroxysmal nocturnal dyspnea.   Respiratory:  Negative for shortness of breath.    Hematologic/Lymphatic: Does not bruise/bleed easily.   Musculoskeletal:  Negative for muscle weakness.   Genitourinary:  Negative for hematuria.   Neurological:  Negative for dizziness and weakness.   All other systems reviewed and are negative.         Objective:     Vitals reviewed.   Constitutional:       General: Not in acute distress.     Appearance: Normal appearance. Well-developed.   Eyes:      Pupils: Pupils are equal, round, and reactive to light.   HENT:      Head: Normocephalic and atraumatic.      Nose: Nose normal.   Neck:      Vascular: No carotid bruit.   Pulmonary:      Effort: Pulmonary effort is normal. No respiratory distress.      Breath sounds: Normal breath sounds. No wheezing. No rales.   Cardiovascular:      Normal rate. Regular rhythm.      Murmurs: There is no murmur.   Edema:     Peripheral edema absent.   Abdominal:      General: There is no distension.      Palpations: Abdomen is soft.   Musculoskeletal: Normal range of motion.      Cervical back: Normal range of motion and neck supple. Skin:     General: Skin is warm.      Findings: No erythema or rash.   Neurological:      General: No focal deficit  "present.      Mental Status: Alert and oriented to person, place, and time.   Psychiatric:         Attention and Perception: Attention normal.         Mood and Affect: Mood normal.         Speech: Speech normal.         Behavior: Behavior normal.         Thought Content: Thought content normal.         Judgment: Judgment normal.         /68   Pulse 53   Ht 177.8 cm (70\")   Wt 88 kg (194 lb)   SpO2 98%   BMI 27.84 kg/m²       ECG 12 Lead    Date/Time: 11/15/2024 8:52 AM  Performed by: Awais Salmeron APRN    Authorized by: Awais Salmeron APRN  Comparison: compared with previous ECG from 12/29/2023  Similar to previous ECG  Rhythm: sinus bradycardia  Rate: normal  Other findings: non-specific ST-T wave changes          Lab Review:   Lipid panel 4/19/2023  Total cholesterol 123  HDL 30  LDL 53  Triglycerides 200      Hemoglobin A1C: 4/19/2023  A1C: 7.1    Echo 1/8/2021:  Interpretation Summary  Left ventricular ejection fraction appears to be 36 - 40%. Left ventricular systolic function is moderately decreased.  The following left ventricular wall segments are hypokinetic: mid anterior, apical anterior, apical lateral, apical inferior, mid inferior, apical septal, mid inferoseptal, apex hypokinetic, mid anteroseptal and basal inferior.  Left ventricular diastolic function is consistent with (grade I) impaired relaxation.  Normal right ventricular cavity size and systolic function noted.  There is no significant (greater than mild) valvular dysfunction.     I have personally reviewed labs, echo and past office notes prior to patients visit  Assessment:          Diagnosis Plan   1. Chronic systolic congestive heart failure        2. Coronary artery disease involving coronary bypass graft of native heart without angina pectoris        3. Essential hypertension        4. Primary hypertension        5. Mixed hyperlipidemia        6. Type 2 diabetes mellitus without complication, without long-term current use " of insulin        7. Stenosis of left carotid artery                 Plan:       1. Chronic systolic congestive heart failure: LVEF 36-40% on echo from 1/8/2021. NYHA class I. Patient is euvolemic and stable on examination today. Continue ramipril and metoprolol. Reviewed signs and symptoms of CHF and what to report to office with patient. Patient instructed to restrict sodium and record daily weight. Patient is to report weight gain of greater than 2 lbs overnight or 5 lbs in 1 week. Patient verbalized understanding of instructions and plan of care.     2. CAD: s/p 4-V CABG 1/18/2021. Patient remains chest pain free. Continue aspirin, plavix, metoprolol and rosuvastatin.     3. Hypertension: well controlled. Continue current medications. Recommend to monitor routinely and notify office if consistently >140/80.     4. Hyperlipidemia: Lipid panel from 10/2024 showed elevated triglycerides but otherwise good control of cholesterol,  LDL 57. Continue rosuvastatin. Followed and managed by PCP      5. Type 2 DM: A1C 10/2024 was 7.2. Managed and followed by PCP    6. Carotid artery disease: Moderate 50-69% left ICA stenosis 6/2022. Continue rosuvastatin and Plavix. Continue routine monitoring, will order US Carotids at next follow up.     I attest that all portions of this note reviewed and all information has been updated by myself to reflect the patient's current status.      I spent 35 minutes caring for Wesley on this date of service. This time includes time spent by me in the following activities:preparing for the visit, reviewing tests, obtaining and/or reviewing a separately obtained history, performing a medically appropriate examination and/or evaluation , counseling and educating the patient/family/caregiver, and documenting information in the medical record    I spent 2 minutes on the separately reported service of EKG. This time is not included in the time used to support the E/M service also reported  today.    Patient is to follow up in 1 year or sooner if needed

## 2025-07-22 RX ORDER — RAMIPRIL 10 MG/1
10 CAPSULE ORAL DAILY
Qty: 90 CAPSULE | Refills: 3 | Status: SHIPPED | OUTPATIENT
Start: 2025-07-22

## (undated) DEVICE — PK SET UP ANES OPN HEART 30

## (undated) DEVICE — CLTH CLENS READYCLEANSE PERI CARE PK/5

## (undated) DEVICE — SOL IRR NACL 0.9PCT BT 1000ML

## (undated) DEVICE — STERNUM BLADE, OFFSET (32.0 X 0.8 X 6.3MM)

## (undated) DEVICE — TIBURON BRACHIAL ANGIOGRAPHY DRAPE: Brand: CONVERTORS

## (undated) DEVICE — PK CATH CARD 30 CA/4

## (undated) DEVICE — CATH F5 INF 3DRC 100CM: Brand: INFINITI

## (undated) DEVICE — CATH DIAG IMPULSE MPA1 5F 100CM

## (undated) DEVICE — PINNACLE INTRODUCER SHEATH: Brand: PINNACLE

## (undated) DEVICE — FR5 INFINITI MULTIPAC: Brand: INFINITI

## (undated) DEVICE — DRAIN,WOUND,ROUND,24FR,5/16",FULL-FLUTED: Brand: MEDLINE

## (undated) DEVICE — GLIDESHEATH SLENDER STAINLESS STEEL KIT: Brand: GLIDESHEATH SLENDER

## (undated) DEVICE — CATH F5 INF AR MOD 100CM: Brand: INFINITI

## (undated) DEVICE — GLV SURG BIOGEL LTX PF 6 1/2

## (undated) DEVICE — GLV SURG BIOGEL LTX PF 7 1/2

## (undated) DEVICE — SKIN AFFIX SURG ADHESIVE 72/CS 0.55ML: Brand: MEDLINE

## (undated) DEVICE — PK OPN HEART 30

## (undated) DEVICE — BLAKE SILICONE DRAINS CARDIO CONNECTOR 2:1: Brand: BLAKE

## (undated) DEVICE — OCEAN DRAIN, SINGLE, IN-LINE, STOP: Brand: OCEAN

## (undated) DEVICE — CATH DIAG DXTERITY NOTO CRV 5F 100CM 0/SH

## (undated) DEVICE — SYS VASOVIEW HEMOPRO ENDOSCOPIC HARVST VESL

## (undated) DEVICE — GW STARTER FXD CORE J .035 3X150CM 3MM

## (undated) DEVICE — E-PACK PROCEDURE KIT: Brand: E-PACK

## (undated) DEVICE — KT ANTI FOG W/FLD AND SPNG

## (undated) DEVICE — MODEL AT P65, P/N 701554-001KIT CONTENTS: HAND CONTROLLER, 3-WAY HIGH-PRESSURE STOPCOCK WITH ROTATING END AND PREMIUM HIGH-PRESSURE TUBING: Brand: ANGIOTOUCH® KIT

## (undated) DEVICE — MODEL BT2000 P/N 700287-012KIT CONTENTS: MANIFOLD WITH SALINE AND CONTRAST PORTS, SALINE TUBING WITH SPIKE AND HAND SYRINGE, TRANSDUCER: Brand: BT2000 AUTOMATED MANIFOLD KIT

## (undated) DEVICE — SKIN PREP TRAY W/CHG: Brand: MEDLINE INDUSTRIES, INC.

## (undated) DEVICE — SYS DISTNTION VEIN BONCHEK 300MMHG

## (undated) DEVICE — BLAKE SILICONE DRAIN, 19 FR ROUND, HUBLESS WITH 1/4" BENDABLE TROCAR: Brand: BLAKE

## (undated) DEVICE — DRSNG SURESITE WNDW 4X4.5

## (undated) DEVICE — SUT SILK 2/0 FS BLK 18IN 685G

## (undated) DEVICE — CANN CO2/O2 NASL A/

## (undated) DEVICE — 1/2 FORCE SURGICAL SPRING CLIP: Brand: STEALTH® SPRING CLIP

## (undated) DEVICE — PENCL ES MEGADINE EZ/CLEAN BUTN W/HOLSTR 10FT

## (undated) DEVICE — PK TURNOVER RM ADV

## (undated) DEVICE — GAUZE,SPONGE,4"X4",16PLY,XRAY,STRL,LF: Brand: MEDLINE

## (undated) DEVICE — SOLIDIFIER LIQUI LOC PLUS 2000CC

## (undated) DEVICE — AORTIC PUNCHES ARE USED TO CREATE A UNIFORM OPENING IN BLOOD VESSELS DURING CARDIOVASCULAR SURGERY. THE VESSEL GRAFT IS INSERTED INTO THE CREATED OPENING AND SUTURED TO THE VESSEL WALL. AORTIC LANCETS ARE USED TO MAKE A SMALL UNIFORM CUT IN A BLOOD VESSEL TO FACILITATE INSERTION OF AN AORTIC PUNCH.  PUNCHES COME IN VARIOUS LENGTHS, DIAMETERS AND TIP CONFIGURATIONS.: Brand: CLEANCUT ROTATING AORTIC PUNCH

## (undated) DEVICE — Device: Brand: LEVEL 1

## (undated) DEVICE — GLV SURG TRIUMPH MICRO PF LTX 8.5 STRL

## (undated) DEVICE — RESERVOIR,SUCTION,100CC,SILICONE: Brand: MEDLINE

## (undated) DEVICE — ELECTRD PAD DEFIB A/

## (undated) DEVICE — RADIFOCUS OPTITORQUE ANGIOGRAPHIC CATHETER: Brand: OPTITORQUE